# Patient Record
Sex: FEMALE | Race: WHITE | NOT HISPANIC OR LATINO | Employment: OTHER | URBAN - METROPOLITAN AREA
[De-identification: names, ages, dates, MRNs, and addresses within clinical notes are randomized per-mention and may not be internally consistent; named-entity substitution may affect disease eponyms.]

---

## 2018-05-27 ENCOUNTER — HOSPITAL ENCOUNTER (OUTPATIENT)
Facility: HOSPITAL | Age: 83
Setting detail: OBSERVATION
Discharge: HOME | End: 2018-05-30
Attending: FAMILY MEDICINE | Admitting: INTERNAL MEDICINE

## 2018-05-27 ENCOUNTER — APPOINTMENT (OUTPATIENT)
Dept: CT IMAGING | Facility: HOSPITAL | Age: 83
End: 2018-05-27

## 2018-05-27 DIAGNOSIS — K92.2 UPPER GI BLEED: Primary | ICD-10-CM

## 2018-05-27 DIAGNOSIS — K21.9 GASTROESOPHAGEAL REFLUX DISEASE, ESOPHAGITIS PRESENCE NOT SPECIFIED: ICD-10-CM

## 2018-05-27 DIAGNOSIS — D62 ANEMIA, POSTHEMORRHAGIC, ACUTE: ICD-10-CM

## 2018-05-27 PROBLEM — I10 HYPERTENSION: Status: ACTIVE | Noted: 2018-05-27

## 2018-05-27 PROBLEM — I51.89 DIASTOLIC DYSFUNCTION: Status: ACTIVE | Noted: 2018-05-27

## 2018-05-27 PROBLEM — G20 PARKINSONS (HCC): Status: ACTIVE | Noted: 2018-05-27

## 2018-05-27 PROBLEM — M06.9 RHEUMATOID ARTHRITIS (HCC): Status: ACTIVE | Noted: 2018-05-27

## 2018-05-27 LAB
ALBUMIN SERPL-MCNC: 4 G/DL (ref 3.5–5.2)
ALBUMIN/GLOB SERPL: 2.1 G/DL
ALP SERPL-CCNC: 48 U/L (ref 39–117)
ALT SERPL W P-5'-P-CCNC: 8 U/L (ref 1–33)
ANION GAP SERPL CALCULATED.3IONS-SCNC: 14 MMOL/L
AST SERPL-CCNC: 8 U/L (ref 1–32)
BACTERIA UR QL AUTO: ABNORMAL /HPF
BASOPHILS # BLD AUTO: 0.02 10*3/MM3 (ref 0–0.2)
BASOPHILS NFR BLD AUTO: 0.2 % (ref 0–1.5)
BILIRUB SERPL-MCNC: 0.7 MG/DL (ref 0.1–1.2)
BILIRUB UR QL STRIP: NEGATIVE
BUN BLD-MCNC: 36 MG/DL (ref 8–23)
BUN/CREAT SERPL: 44.4 (ref 7–25)
CALCIUM SPEC-SCNC: 9.2 MG/DL (ref 8.6–10.5)
CHLORIDE SERPL-SCNC: 98 MMOL/L (ref 98–107)
CLARITY UR: ABNORMAL
CO2 SERPL-SCNC: 33 MMOL/L (ref 22–29)
COLOR UR: YELLOW
CREAT BLD-MCNC: 0.81 MG/DL (ref 0.57–1)
DEPRECATED RDW RBC AUTO: 51.2 FL (ref 37–54)
EOSINOPHIL # BLD AUTO: 0.12 10*3/MM3 (ref 0–0.7)
EOSINOPHIL NFR BLD AUTO: 1.2 % (ref 0.3–6.2)
ERYTHROCYTE [DISTWIDTH] IN BLOOD BY AUTOMATED COUNT: 14.3 % (ref 11.7–13)
GFR SERPL CREATININE-BSD FRML MDRD: 68 ML/MIN/1.73
GLOBULIN UR ELPH-MCNC: 1.9 GM/DL
GLUCOSE BLD-MCNC: 98 MG/DL (ref 65–99)
GLUCOSE UR STRIP-MCNC: NEGATIVE MG/DL
HCT VFR BLD AUTO: 37.2 % (ref 35.6–45.5)
HCT VFR BLD AUTO: 37.9 % (ref 35.6–45.5)
HGB BLD-MCNC: 12.5 G/DL (ref 11.9–15.5)
HGB BLD-MCNC: 12.6 G/DL (ref 11.9–15.5)
HGB UR QL STRIP.AUTO: NEGATIVE
HYALINE CASTS UR QL AUTO: ABNORMAL /LPF
IMM GRANULOCYTES # BLD: 0.03 10*3/MM3 (ref 0–0.03)
IMM GRANULOCYTES NFR BLD: 0.3 % (ref 0–0.5)
KETONES UR QL STRIP: NEGATIVE
LEUKOCYTE ESTERASE UR QL STRIP.AUTO: ABNORMAL
LIPASE SERPL-CCNC: 18 U/L (ref 13–60)
LYMPHOCYTES # BLD AUTO: 1.51 10*3/MM3 (ref 0.9–4.8)
LYMPHOCYTES NFR BLD AUTO: 15.1 % (ref 19.6–45.3)
MCH RBC QN AUTO: 33 PG (ref 26.9–32)
MCHC RBC AUTO-ENTMCNC: 33.2 G/DL (ref 32.4–36.3)
MCV RBC AUTO: 99.2 FL (ref 80.5–98.2)
MONOCYTES # BLD AUTO: 0.77 10*3/MM3 (ref 0.2–1.2)
MONOCYTES NFR BLD AUTO: 7.7 % (ref 5–12)
NEUTROPHILS # BLD AUTO: 7.6 10*3/MM3 (ref 1.9–8.1)
NEUTROPHILS NFR BLD AUTO: 75.8 % (ref 42.7–76)
NITRITE UR QL STRIP: NEGATIVE
PH UR STRIP.AUTO: 7.5 [PH] (ref 5–8)
PLATELET # BLD AUTO: 185 10*3/MM3 (ref 140–500)
PMV BLD AUTO: 12.2 FL (ref 6–12)
POTASSIUM BLD-SCNC: 3.3 MMOL/L (ref 3.5–5.2)
PROT SERPL-MCNC: 5.9 G/DL (ref 6–8.5)
PROT UR QL STRIP: NEGATIVE
RBC # BLD AUTO: 3.82 10*6/MM3 (ref 3.9–5.2)
RBC # UR: ABNORMAL /HPF
REF LAB TEST METHOD: ABNORMAL
SODIUM BLD-SCNC: 145 MMOL/L (ref 136–145)
SP GR UR STRIP: >=1.03 (ref 1–1.03)
SQUAMOUS #/AREA URNS HPF: ABNORMAL /HPF
UROBILINOGEN UR QL STRIP: ABNORMAL
WBC NRBC COR # BLD: 10.02 10*3/MM3 (ref 4.5–10.7)
WBC UR QL AUTO: ABNORMAL /HPF

## 2018-05-27 PROCEDURE — 99284 EMERGENCY DEPT VISIT MOD MDM: CPT

## 2018-05-27 PROCEDURE — 96361 HYDRATE IV INFUSION ADD-ON: CPT

## 2018-05-27 PROCEDURE — 96365 THER/PROPH/DIAG IV INF INIT: CPT

## 2018-05-27 PROCEDURE — 81001 URINALYSIS AUTO W/SCOPE: CPT | Performed by: FAMILY MEDICINE

## 2018-05-27 PROCEDURE — 83690 ASSAY OF LIPASE: CPT | Performed by: FAMILY MEDICINE

## 2018-05-27 PROCEDURE — 85018 HEMOGLOBIN: CPT | Performed by: INTERNAL MEDICINE

## 2018-05-27 PROCEDURE — 85025 COMPLETE CBC W/AUTO DIFF WBC: CPT | Performed by: FAMILY MEDICINE

## 2018-05-27 PROCEDURE — G0378 HOSPITAL OBSERVATION PER HR: HCPCS

## 2018-05-27 PROCEDURE — 80053 COMPREHEN METABOLIC PANEL: CPT | Performed by: FAMILY MEDICINE

## 2018-05-27 PROCEDURE — 25010000002 IOPAMIDOL 61 % SOLUTION: Performed by: FAMILY MEDICINE

## 2018-05-27 PROCEDURE — 85014 HEMATOCRIT: CPT | Performed by: INTERNAL MEDICINE

## 2018-05-27 PROCEDURE — 25010000002 ONDANSETRON PER 1 MG: Performed by: FAMILY MEDICINE

## 2018-05-27 PROCEDURE — 96375 TX/PRO/DX INJ NEW DRUG ADDON: CPT

## 2018-05-27 PROCEDURE — 96376 TX/PRO/DX INJ SAME DRUG ADON: CPT

## 2018-05-27 PROCEDURE — 96366 THER/PROPH/DIAG IV INF ADDON: CPT

## 2018-05-27 PROCEDURE — 74177 CT ABD & PELVIS W/CONTRAST: CPT

## 2018-05-27 RX ORDER — FLUTICASONE PROPIONATE 50 MCG
2 SPRAY, SUSPENSION (ML) NASAL DAILY
COMMUNITY

## 2018-05-27 RX ORDER — SODIUM CHLORIDE 0.9 % (FLUSH) 0.9 %
1-10 SYRINGE (ML) INJECTION AS NEEDED
Status: DISCONTINUED | OUTPATIENT
Start: 2018-05-27 | End: 2018-05-30 | Stop reason: HOSPADM

## 2018-05-27 RX ORDER — ERGOCALCIFEROL 1.25 MG/1
50000 CAPSULE ORAL
COMMUNITY

## 2018-05-27 RX ORDER — ACETAMINOPHEN 325 MG/1
650 TABLET ORAL EVERY 6 HOURS PRN
Status: DISCONTINUED | OUTPATIENT
Start: 2018-05-27 | End: 2018-05-30 | Stop reason: HOSPADM

## 2018-05-27 RX ORDER — POTASSIUM CHLORIDE 750 MG/1
10 TABLET, FILM COATED, EXTENDED RELEASE ORAL DAILY
Status: ON HOLD | COMMUNITY
End: 2019-02-17 | Stop reason: SDUPTHER

## 2018-05-27 RX ORDER — FUROSEMIDE 40 MG/1
40 TABLET ORAL DAILY
COMMUNITY

## 2018-05-27 RX ORDER — CHLORDIAZEPOXIDE HYDROCHLORIDE 5 MG/1
5 CAPSULE, GELATIN COATED ORAL 3 TIMES DAILY PRN
Status: ON HOLD | COMMUNITY
End: 2019-02-17 | Stop reason: SDUPTHER

## 2018-05-27 RX ORDER — AMLODIPINE BESYLATE 10 MG/1
10 TABLET ORAL EVERY OTHER DAY
COMMUNITY
End: 2022-12-29 | Stop reason: HOSPADM

## 2018-05-27 RX ORDER — PAROXETINE HYDROCHLORIDE 20 MG/1
20 TABLET, FILM COATED ORAL EVERY MORNING
Status: DISCONTINUED | OUTPATIENT
Start: 2018-05-28 | End: 2018-05-30 | Stop reason: HOSPADM

## 2018-05-27 RX ORDER — CIPROFLOXACIN 250 MG/1
250 TABLET, FILM COATED ORAL 2 TIMES DAILY
COMMUNITY
End: 2018-05-30 | Stop reason: HOSPADM

## 2018-05-27 RX ORDER — HYDROCODONE BITARTRATE AND ACETAMINOPHEN 5; 325 MG/1; MG/1
1 TABLET ORAL EVERY 6 HOURS PRN
Status: DISCONTINUED | OUTPATIENT
Start: 2018-05-27 | End: 2018-05-30 | Stop reason: HOSPADM

## 2018-05-27 RX ORDER — POTASSIUM CHLORIDE 750 MG/1
20 CAPSULE, EXTENDED RELEASE ORAL DAILY
Status: DISCONTINUED | OUTPATIENT
Start: 2018-05-28 | End: 2018-05-30 | Stop reason: HOSPADM

## 2018-05-27 RX ORDER — SODIUM CHLORIDE 9 MG/ML
125 INJECTION, SOLUTION INTRAVENOUS CONTINUOUS
Status: DISCONTINUED | OUTPATIENT
Start: 2018-05-27 | End: 2018-05-28

## 2018-05-27 RX ORDER — MELATONIN
5000 DAILY
Status: DISCONTINUED | OUTPATIENT
Start: 2018-05-28 | End: 2018-05-30 | Stop reason: HOSPADM

## 2018-05-27 RX ORDER — HYDROCODONE BITARTRATE AND ACETAMINOPHEN 5; 325 MG/1; MG/1
1 TABLET ORAL EVERY 8 HOURS PRN
Status: ON HOLD | COMMUNITY
End: 2019-02-17 | Stop reason: SDUPTHER

## 2018-05-27 RX ORDER — MINERAL OIL AND PETROLATUM 150; 830 MG/G; MG/G
OINTMENT OPHTHALMIC
Status: DISCONTINUED | OUTPATIENT
Start: 2018-05-27 | End: 2018-05-30 | Stop reason: HOSPADM

## 2018-05-27 RX ORDER — AMLODIPINE BESYLATE 10 MG/1
10 TABLET ORAL DAILY
Status: DISCONTINUED | OUTPATIENT
Start: 2018-05-28 | End: 2018-05-30 | Stop reason: HOSPADM

## 2018-05-27 RX ORDER — POTASSIUM CHLORIDE 1.5 G/1.77G
20 POWDER, FOR SOLUTION ORAL DAILY
Status: DISCONTINUED | OUTPATIENT
Start: 2018-05-28 | End: 2018-05-27 | Stop reason: CLARIF

## 2018-05-27 RX ORDER — CHLORDIAZEPOXIDE HYDROCHLORIDE 5 MG/1
5 CAPSULE, GELATIN COATED ORAL 3 TIMES DAILY PRN
Status: DISCONTINUED | OUTPATIENT
Start: 2018-05-27 | End: 2018-05-30 | Stop reason: HOSPADM

## 2018-05-27 RX ORDER — BUDESONIDE AND FORMOTEROL FUMARATE DIHYDRATE 160; 4.5 UG/1; UG/1
2 AEROSOL RESPIRATORY (INHALATION)
Status: DISCONTINUED | OUTPATIENT
Start: 2018-05-27 | End: 2018-05-30 | Stop reason: HOSPADM

## 2018-05-27 RX ORDER — FUROSEMIDE 40 MG/1
40 TABLET ORAL DAILY
Status: DISCONTINUED | OUTPATIENT
Start: 2018-05-28 | End: 2018-05-30 | Stop reason: HOSPADM

## 2018-05-27 RX ORDER — FLUTICASONE PROPIONATE 50 MCG
2 SPRAY, SUSPENSION (ML) NASAL DAILY
Status: DISCONTINUED | OUTPATIENT
Start: 2018-05-28 | End: 2018-05-30 | Stop reason: HOSPADM

## 2018-05-27 RX ORDER — ONDANSETRON 2 MG/ML
4 INJECTION INTRAMUSCULAR; INTRAVENOUS ONCE
Status: COMPLETED | OUTPATIENT
Start: 2018-05-27 | End: 2018-05-27

## 2018-05-27 RX ORDER — ACETAMINOPHEN 325 MG/1
650 TABLET ORAL EVERY 6 HOURS PRN
Status: ON HOLD | COMMUNITY
End: 2019-02-12

## 2018-05-27 RX ORDER — LANSOPRAZOLE 30 MG/1
30 CAPSULE, DELAYED RELEASE ORAL DAILY
COMMUNITY
End: 2018-05-30 | Stop reason: HOSPADM

## 2018-05-27 RX ORDER — MELOXICAM 7.5 MG/1
7.5 TABLET ORAL 2 TIMES DAILY
COMMUNITY
End: 2018-05-30 | Stop reason: HOSPADM

## 2018-05-27 RX ORDER — PANTOPRAZOLE SODIUM 40 MG/10ML
80 INJECTION, POWDER, LYOPHILIZED, FOR SOLUTION INTRAVENOUS ONCE
Status: COMPLETED | OUTPATIENT
Start: 2018-05-27 | End: 2018-05-27

## 2018-05-27 RX ORDER — PAROXETINE HYDROCHLORIDE 20 MG/1
20 TABLET, FILM COATED ORAL EVERY MORNING
COMMUNITY

## 2018-05-27 RX ADMIN — PANTOPRAZOLE SODIUM 80 MG: 40 INJECTION, POWDER, FOR SOLUTION INTRAVENOUS at 15:45

## 2018-05-27 RX ADMIN — SODIUM CHLORIDE 8 MG/HR: 900 INJECTION INTRAVENOUS at 20:45

## 2018-05-27 RX ADMIN — SODIUM CHLORIDE 125 ML/HR: 9 INJECTION, SOLUTION INTRAVENOUS at 14:05

## 2018-05-27 RX ADMIN — ONDANSETRON 4 MG: 2 INJECTION INTRAMUSCULAR; INTRAVENOUS at 14:04

## 2018-05-27 RX ADMIN — SODIUM CHLORIDE 8 MG/HR: 900 INJECTION INTRAVENOUS at 15:45

## 2018-05-27 RX ADMIN — IOPAMIDOL 85 ML: 612 INJECTION, SOLUTION INTRAVENOUS at 14:31

## 2018-05-28 PROBLEM — D62 ANEMIA, POSTHEMORRHAGIC, ACUTE: Status: ACTIVE | Noted: 2018-05-28

## 2018-05-28 LAB
ALBUMIN SERPL-MCNC: 3.4 G/DL (ref 3.5–5.2)
ALBUMIN/GLOB SERPL: 2 G/DL
ALP SERPL-CCNC: 38 U/L (ref 39–117)
ALT SERPL W P-5'-P-CCNC: 7 U/L (ref 1–33)
ANION GAP SERPL CALCULATED.3IONS-SCNC: 16 MMOL/L
AST SERPL-CCNC: 13 U/L (ref 1–32)
BASOPHILS # BLD AUTO: 0.02 10*3/MM3 (ref 0–0.2)
BASOPHILS NFR BLD AUTO: 0.3 % (ref 0–1.5)
BILIRUB SERPL-MCNC: 0.5 MG/DL (ref 0.1–1.2)
BUN BLD-MCNC: 25 MG/DL (ref 8–23)
BUN/CREAT SERPL: 43.1 (ref 7–25)
CALCIUM SPEC-SCNC: 8.3 MG/DL (ref 8.6–10.5)
CHLORIDE SERPL-SCNC: 103 MMOL/L (ref 98–107)
CO2 SERPL-SCNC: 25 MMOL/L (ref 22–29)
CREAT BLD-MCNC: 0.58 MG/DL (ref 0.57–1)
DEPRECATED RDW RBC AUTO: 54.3 FL (ref 37–54)
EOSINOPHIL # BLD AUTO: 0.12 10*3/MM3 (ref 0–0.7)
EOSINOPHIL NFR BLD AUTO: 1.8 % (ref 0.3–6.2)
ERYTHROCYTE [DISTWIDTH] IN BLOOD BY AUTOMATED COUNT: 14.7 % (ref 11.7–13)
GFR SERPL CREATININE-BSD FRML MDRD: 100 ML/MIN/1.73
GLOBULIN UR ELPH-MCNC: 1.7 GM/DL
GLUCOSE BLD-MCNC: 79 MG/DL (ref 65–99)
HCT VFR BLD AUTO: 32.2 % (ref 35.6–45.5)
HCT VFR BLD AUTO: 32.8 % (ref 35.6–45.5)
HCT VFR BLD AUTO: 33.1 % (ref 35.6–45.5)
HCT VFR BLD AUTO: 37.1 % (ref 35.6–45.5)
HGB BLD-MCNC: 10.6 G/DL (ref 11.9–15.5)
HGB BLD-MCNC: 10.7 G/DL (ref 11.9–15.5)
HGB BLD-MCNC: 10.8 G/DL (ref 11.9–15.5)
HGB BLD-MCNC: 12.1 G/DL (ref 11.9–15.5)
IMM GRANULOCYTES # BLD: 0.01 10*3/MM3 (ref 0–0.03)
IMM GRANULOCYTES NFR BLD: 0.1 % (ref 0–0.5)
LYMPHOCYTES # BLD AUTO: 2.49 10*3/MM3 (ref 0.9–4.8)
LYMPHOCYTES NFR BLD AUTO: 36.9 % (ref 19.6–45.3)
MCH RBC QN AUTO: 33.4 PG (ref 26.9–32)
MCHC RBC AUTO-ENTMCNC: 32.9 G/DL (ref 32.4–36.3)
MCV RBC AUTO: 101.6 FL (ref 80.5–98.2)
MONOCYTES # BLD AUTO: 0.78 10*3/MM3 (ref 0.2–1.2)
MONOCYTES NFR BLD AUTO: 11.6 % (ref 5–12)
NEUTROPHILS # BLD AUTO: 3.34 10*3/MM3 (ref 1.9–8.1)
NEUTROPHILS NFR BLD AUTO: 49.4 % (ref 42.7–76)
PLATELET # BLD AUTO: 74 10*3/MM3 (ref 140–500)
PMV BLD AUTO: 13.1 FL (ref 6–12)
POTASSIUM BLD-SCNC: 3 MMOL/L (ref 3.5–5.2)
PROT SERPL-MCNC: 5.1 G/DL (ref 6–8.5)
RBC # BLD AUTO: 3.17 10*6/MM3 (ref 3.9–5.2)
RETICS/RBC NFR AUTO: 1.28 % (ref 0.5–1.5)
SODIUM BLD-SCNC: 144 MMOL/L (ref 136–145)
WBC NRBC COR # BLD: 6.75 10*3/MM3 (ref 4.5–10.7)

## 2018-05-28 PROCEDURE — 85014 HEMATOCRIT: CPT | Performed by: INTERNAL MEDICINE

## 2018-05-28 PROCEDURE — 85025 COMPLETE CBC W/AUTO DIFF WBC: CPT | Performed by: INTERNAL MEDICINE

## 2018-05-28 PROCEDURE — 85018 HEMOGLOBIN: CPT | Performed by: INTERNAL MEDICINE

## 2018-05-28 PROCEDURE — 96366 THER/PROPH/DIAG IV INF ADDON: CPT

## 2018-05-28 PROCEDURE — 94640 AIRWAY INHALATION TREATMENT: CPT

## 2018-05-28 PROCEDURE — 80053 COMPREHEN METABOLIC PANEL: CPT | Performed by: INTERNAL MEDICINE

## 2018-05-28 PROCEDURE — 85045 AUTOMATED RETICULOCYTE COUNT: CPT | Performed by: INTERNAL MEDICINE

## 2018-05-28 PROCEDURE — 99203 OFFICE O/P NEW LOW 30 MIN: CPT | Performed by: INTERNAL MEDICINE

## 2018-05-28 PROCEDURE — G0378 HOSPITAL OBSERVATION PER HR: HCPCS

## 2018-05-28 RX ORDER — POTASSIUM CHLORIDE 750 MG/1
40 CAPSULE, EXTENDED RELEASE ORAL EVERY 4 HOURS
Status: DISPENSED | OUTPATIENT
Start: 2018-05-28 | End: 2018-05-28

## 2018-05-28 RX ORDER — POTASSIUM CHLORIDE 750 MG/1
40 CAPSULE, EXTENDED RELEASE ORAL EVERY 4 HOURS
Status: CANCELLED | OUTPATIENT
Start: 2018-05-28 | End: 2018-05-28

## 2018-05-28 RX ORDER — ONDANSETRON 4 MG/1
4 TABLET, FILM COATED ORAL EVERY 6 HOURS PRN
Status: DISCONTINUED | OUTPATIENT
Start: 2018-05-28 | End: 2018-05-30 | Stop reason: HOSPADM

## 2018-05-28 RX ORDER — ONDANSETRON 4 MG/1
4 TABLET, ORALLY DISINTEGRATING ORAL EVERY 6 HOURS PRN
Status: DISCONTINUED | OUTPATIENT
Start: 2018-05-28 | End: 2018-05-30 | Stop reason: HOSPADM

## 2018-05-28 RX ORDER — ONDANSETRON 2 MG/ML
4 INJECTION INTRAMUSCULAR; INTRAVENOUS EVERY 6 HOURS PRN
Status: DISCONTINUED | OUTPATIENT
Start: 2018-05-28 | End: 2018-05-30 | Stop reason: HOSPADM

## 2018-05-28 RX ADMIN — VITAMIN D, TAB 1000IU (100/BT) 5000 UNITS: 25 TAB at 11:45

## 2018-05-28 RX ADMIN — HYDROCODONE BITARTRATE AND ACETAMINOPHEN 1 TABLET: 5; 325 TABLET ORAL at 20:53

## 2018-05-28 RX ADMIN — CARBIDOPA AND LEVODOPA 2 TABLET: 25; 100 TABLET ORAL at 11:44

## 2018-05-28 RX ADMIN — POTASSIUM CHLORIDE 20 MEQ: 750 CAPSULE, EXTENDED RELEASE ORAL at 11:44

## 2018-05-28 RX ADMIN — SODIUM CHLORIDE 8 MG/HR: 900 INJECTION INTRAVENOUS at 22:10

## 2018-05-28 RX ADMIN — SODIUM CHLORIDE 8 MG/HR: 900 INJECTION INTRAVENOUS at 11:43

## 2018-05-28 RX ADMIN — AMLODIPINE BESYLATE 10 MG: 10 TABLET ORAL at 11:44

## 2018-05-28 RX ADMIN — SODIUM CHLORIDE 125 ML/HR: 9 INJECTION, SOLUTION INTRAVENOUS at 05:07

## 2018-05-28 RX ADMIN — CARBIDOPA AND LEVODOPA 2 TABLET: 25; 100 TABLET ORAL at 17:02

## 2018-05-28 RX ADMIN — POTASSIUM CHLORIDE 40 MEQ: 750 CAPSULE, EXTENDED RELEASE ORAL at 17:03

## 2018-05-28 RX ADMIN — BUDESONIDE AND FORMOTEROL FUMARATE DIHYDRATE 2 PUFF: 160; 4.5 AEROSOL RESPIRATORY (INHALATION) at 21:13

## 2018-05-28 RX ADMIN — SODIUM CHLORIDE 8 MG/HR: 900 INJECTION INTRAVENOUS at 05:07

## 2018-05-28 RX ADMIN — SODIUM CHLORIDE 8 MG/HR: 900 INJECTION INTRAVENOUS at 00:48

## 2018-05-28 RX ADMIN — PAROXETINE HYDROCHLORIDE 20 MG: 20 TABLET, FILM COATED ORAL at 11:50

## 2018-05-28 RX ADMIN — SODIUM CHLORIDE 125 ML/HR: 9 INJECTION, SOLUTION INTRAVENOUS at 11:43

## 2018-05-28 RX ADMIN — FUROSEMIDE 40 MG: 40 TABLET ORAL at 11:44

## 2018-05-28 RX ADMIN — CARBIDOPA AND LEVODOPA 2 TABLET: 25; 100 TABLET ORAL at 20:53

## 2018-05-29 ENCOUNTER — APPOINTMENT (OUTPATIENT)
Dept: GENERAL RADIOLOGY | Facility: HOSPITAL | Age: 83
End: 2018-05-29
Attending: INTERNAL MEDICINE

## 2018-05-29 PROBLEM — K21.9 GASTROESOPHAGEAL REFLUX DISEASE: Status: ACTIVE | Noted: 2018-05-29

## 2018-05-29 LAB
FERRITIN SERPL-MCNC: 95.92 NG/ML (ref 13–150)
HCT VFR BLD AUTO: 31.3 % (ref 35.6–45.5)
HCT VFR BLD AUTO: 32.5 % (ref 35.6–45.5)
HGB BLD-MCNC: 10.2 G/DL (ref 11.9–15.5)
HGB BLD-MCNC: 10.8 G/DL (ref 11.9–15.5)
INR PPP: 1.05 (ref 0.9–1.1)
IRON 24H UR-MRATE: 92 MCG/DL (ref 37–145)
IRON SATN MFR SERPL: 27 % (ref 20–50)
MAGNESIUM SERPL-MCNC: 1.6 MG/DL (ref 1.6–2.4)
PROTHROMBIN TIME: 13.5 SECONDS (ref 11.7–14.2)
TIBC SERPL-MCNC: 338 MCG/DL
TRANSFERRIN SERPL-MCNC: 227 MG/DL (ref 200–360)
VIT B12 BLD-MCNC: 192 PG/ML (ref 211–946)

## 2018-05-29 PROCEDURE — 83540 ASSAY OF IRON: CPT | Performed by: INTERNAL MEDICINE

## 2018-05-29 PROCEDURE — 83735 ASSAY OF MAGNESIUM: CPT | Performed by: HOSPITALIST

## 2018-05-29 PROCEDURE — 71046 X-RAY EXAM CHEST 2 VIEWS: CPT

## 2018-05-29 PROCEDURE — 82728 ASSAY OF FERRITIN: CPT | Performed by: INTERNAL MEDICINE

## 2018-05-29 PROCEDURE — 82747 ASSAY OF FOLIC ACID RBC: CPT | Performed by: INTERNAL MEDICINE

## 2018-05-29 PROCEDURE — 85018 HEMOGLOBIN: CPT | Performed by: INTERNAL MEDICINE

## 2018-05-29 PROCEDURE — 85610 PROTHROMBIN TIME: CPT | Performed by: INTERNAL MEDICINE

## 2018-05-29 PROCEDURE — 96372 THER/PROPH/DIAG INJ SC/IM: CPT

## 2018-05-29 PROCEDURE — 96366 THER/PROPH/DIAG IV INF ADDON: CPT

## 2018-05-29 PROCEDURE — G0378 HOSPITAL OBSERVATION PER HR: HCPCS

## 2018-05-29 PROCEDURE — 82607 VITAMIN B-12: CPT | Performed by: INTERNAL MEDICINE

## 2018-05-29 PROCEDURE — 85014 HEMATOCRIT: CPT | Performed by: INTERNAL MEDICINE

## 2018-05-29 PROCEDURE — 25010000002 CYANOCOBALAMIN PER 1000 MCG: Performed by: HOSPITALIST

## 2018-05-29 PROCEDURE — 99225 PR SBSQ OBSERVATION CARE/DAY 25 MINUTES: CPT | Performed by: INTERNAL MEDICINE

## 2018-05-29 PROCEDURE — 94799 UNLISTED PULMONARY SVC/PX: CPT

## 2018-05-29 PROCEDURE — 84466 ASSAY OF TRANSFERRIN: CPT | Performed by: INTERNAL MEDICINE

## 2018-05-29 RX ORDER — OXYMETAZOLINE HYDROCHLORIDE 0.05 G/100ML
2 SPRAY NASAL 2 TIMES DAILY
Status: DISCONTINUED | OUTPATIENT
Start: 2018-05-29 | End: 2018-05-30 | Stop reason: HOSPADM

## 2018-05-29 RX ORDER — ECHINACEA PURPUREA EXTRACT 125 MG
1 TABLET ORAL AS NEEDED
Status: DISCONTINUED | OUTPATIENT
Start: 2018-05-29 | End: 2018-05-30 | Stop reason: HOSPADM

## 2018-05-29 RX ORDER — CYANOCOBALAMIN 1000 UG/ML
1000 INJECTION, SOLUTION INTRAMUSCULAR; SUBCUTANEOUS
Status: DISCONTINUED | OUTPATIENT
Start: 2018-05-29 | End: 2018-05-30 | Stop reason: HOSPADM

## 2018-05-29 RX ORDER — FLUTICASONE PROPIONATE 50 MCG
2 SPRAY, SUSPENSION (ML) NASAL NIGHTLY
Status: DISCONTINUED | OUTPATIENT
Start: 2018-05-29 | End: 2018-05-29

## 2018-05-29 RX ADMIN — SODIUM CHLORIDE 8 MG/HR: 900 INJECTION INTRAVENOUS at 13:00

## 2018-05-29 RX ADMIN — BUDESONIDE AND FORMOTEROL FUMARATE DIHYDRATE 2 PUFF: 160; 4.5 AEROSOL RESPIRATORY (INHALATION) at 19:27

## 2018-05-29 RX ADMIN — CARBIDOPA AND LEVODOPA 2 TABLET: 25; 100 TABLET ORAL at 08:02

## 2018-05-29 RX ADMIN — FUROSEMIDE 40 MG: 40 TABLET ORAL at 08:02

## 2018-05-29 RX ADMIN — SODIUM CHLORIDE 8 MG/HR: 900 INJECTION INTRAVENOUS at 07:50

## 2018-05-29 RX ADMIN — SODIUM CHLORIDE 8 MG/HR: 900 INJECTION INTRAVENOUS at 02:43

## 2018-05-29 RX ADMIN — PAROXETINE HYDROCHLORIDE 20 MG: 20 TABLET, FILM COATED ORAL at 08:02

## 2018-05-29 RX ADMIN — FLUTICASONE PROPIONATE 2 SPRAY: 50 SPRAY, METERED NASAL at 08:03

## 2018-05-29 RX ADMIN — OXYMETAZOLINE HYDROCHLORIDE 2 SPRAY: 5 SPRAY NASAL at 20:45

## 2018-05-29 RX ADMIN — AMLODIPINE BESYLATE 10 MG: 10 TABLET ORAL at 08:02

## 2018-05-29 RX ADMIN — CYANOCOBALAMIN 1000 MCG: 1000 INJECTION, SOLUTION INTRAMUSCULAR at 16:15

## 2018-05-29 RX ADMIN — CARBIDOPA AND LEVODOPA 2 TABLET: 25; 100 TABLET ORAL at 20:45

## 2018-05-29 RX ADMIN — BUDESONIDE AND FORMOTEROL FUMARATE DIHYDRATE 2 PUFF: 160; 4.5 AEROSOL RESPIRATORY (INHALATION) at 09:01

## 2018-05-29 RX ADMIN — SODIUM CHLORIDE 8 MG/HR: 900 INJECTION INTRAVENOUS at 18:38

## 2018-05-29 RX ADMIN — VITAMIN D, TAB 1000IU (100/BT) 5000 UNITS: 25 TAB at 08:02

## 2018-05-29 RX ADMIN — CARBIDOPA AND LEVODOPA 2 TABLET: 25; 100 TABLET ORAL at 11:12

## 2018-05-29 RX ADMIN — CARBIDOPA AND LEVODOPA 2 TABLET: 25; 100 TABLET ORAL at 17:06

## 2018-05-29 RX ADMIN — HYDROCODONE BITARTRATE AND ACETAMINOPHEN 1 TABLET: 5; 325 TABLET ORAL at 22:55

## 2018-05-29 RX ADMIN — POTASSIUM CHLORIDE 20 MEQ: 750 CAPSULE, EXTENDED RELEASE ORAL at 08:02

## 2018-05-29 RX ADMIN — SODIUM CHLORIDE 8 MG/HR: 900 INJECTION INTRAVENOUS at 23:26

## 2018-05-30 ENCOUNTER — ANESTHESIA EVENT (OUTPATIENT)
Dept: GASTROENTEROLOGY | Facility: HOSPITAL | Age: 83
End: 2018-05-30

## 2018-05-30 ENCOUNTER — ANESTHESIA (OUTPATIENT)
Dept: GASTROENTEROLOGY | Facility: HOSPITAL | Age: 83
End: 2018-05-30

## 2018-05-30 VITALS
BODY MASS INDEX: 26.08 KG/M2 | HEART RATE: 74 BPM | DIASTOLIC BLOOD PRESSURE: 77 MMHG | SYSTOLIC BLOOD PRESSURE: 123 MMHG | TEMPERATURE: 97.9 F | OXYGEN SATURATION: 94 % | WEIGHT: 162.3 LBS | RESPIRATION RATE: 16 BRPM | HEIGHT: 66 IN

## 2018-05-30 LAB
DEPRECATED RDW RBC AUTO: 52.7 FL (ref 37–54)
ERYTHROCYTE [DISTWIDTH] IN BLOOD BY AUTOMATED COUNT: 14 % (ref 11.7–13)
FOLATE BLD-MCNC: 205.4 NG/ML
FOLATE RBC-MCNC: 667 NG/ML
HCT VFR BLD AUTO: 30.8 % (ref 34–46.6)
HCT VFR BLD AUTO: 32.6 % (ref 35.6–45.5)
HGB BLD-MCNC: 10.7 G/DL (ref 11.9–15.5)
MCH RBC QN AUTO: 33.3 PG (ref 26.9–32)
MCHC RBC AUTO-ENTMCNC: 32.8 G/DL (ref 32.4–36.3)
MCV RBC AUTO: 101.6 FL (ref 80.5–98.2)
PLATELET # BLD AUTO: 157 10*3/MM3 (ref 140–500)
PMV BLD AUTO: 12.3 FL (ref 6–12)
RBC # BLD AUTO: 3.21 10*6/MM3 (ref 3.9–5.2)
WBC NRBC COR # BLD: 8.1 10*3/MM3 (ref 4.5–10.7)

## 2018-05-30 PROCEDURE — 96366 THER/PROPH/DIAG IV INF ADDON: CPT

## 2018-05-30 PROCEDURE — 87081 CULTURE SCREEN ONLY: CPT | Performed by: INTERNAL MEDICINE

## 2018-05-30 PROCEDURE — G0378 HOSPITAL OBSERVATION PER HR: HCPCS

## 2018-05-30 PROCEDURE — 25010000002 PROPOFOL 10 MG/ML EMULSION: Performed by: ANESTHESIOLOGY

## 2018-05-30 PROCEDURE — 94799 UNLISTED PULMONARY SVC/PX: CPT

## 2018-05-30 PROCEDURE — 88305 TISSUE EXAM BY PATHOLOGIST: CPT | Performed by: INTERNAL MEDICINE

## 2018-05-30 PROCEDURE — 85027 COMPLETE CBC AUTOMATED: CPT | Performed by: HOSPITALIST

## 2018-05-30 PROCEDURE — 43239 EGD BIOPSY SINGLE/MULTIPLE: CPT | Performed by: INTERNAL MEDICINE

## 2018-05-30 PROCEDURE — 88312 SPECIAL STAINS GROUP 1: CPT | Performed by: INTERNAL MEDICINE

## 2018-05-30 RX ORDER — LANOLIN ALCOHOL/MO/W.PET/CERES
1000 CREAM (GRAM) TOPICAL DAILY
Qty: 30 TABLET | Refills: 0 | Status: SHIPPED | OUTPATIENT
Start: 2018-05-30

## 2018-05-30 RX ORDER — PANTOPRAZOLE SODIUM 40 MG/1
40 TABLET, DELAYED RELEASE ORAL
Status: DISCONTINUED | OUTPATIENT
Start: 2018-05-30 | End: 2018-05-30 | Stop reason: HOSPADM

## 2018-05-30 RX ORDER — SUCRALFATE ORAL 1 G/10ML
1 SUSPENSION ORAL 4 TIMES DAILY
Qty: 420 ML | Refills: 0 | Status: SHIPPED | OUTPATIENT
Start: 2018-05-30 | End: 2020-03-11

## 2018-05-30 RX ORDER — ECHINACEA PURPUREA EXTRACT 125 MG
1 TABLET ORAL AS NEEDED
Refills: 12
Start: 2018-05-30 | End: 2019-01-02

## 2018-05-30 RX ORDER — PANTOPRAZOLE SODIUM 40 MG/1
40 TABLET, DELAYED RELEASE ORAL 2 TIMES DAILY
Qty: 60 TABLET | Refills: 0 | Status: SHIPPED | OUTPATIENT
Start: 2018-05-30 | End: 2018-05-30

## 2018-05-30 RX ORDER — PROPOFOL 10 MG/ML
VIAL (ML) INTRAVENOUS AS NEEDED
Status: DISCONTINUED | OUTPATIENT
Start: 2018-05-30 | End: 2018-05-30 | Stop reason: SURG

## 2018-05-30 RX ORDER — PANTOPRAZOLE SODIUM 40 MG/1
40 TABLET, DELAYED RELEASE ORAL DAILY
Qty: 30 TABLET | Refills: 0 | Status: SHIPPED | OUTPATIENT
Start: 2018-05-30

## 2018-05-30 RX ORDER — LIDOCAINE HYDROCHLORIDE 20 MG/ML
INJECTION, SOLUTION INFILTRATION; PERINEURAL AS NEEDED
Status: DISCONTINUED | OUTPATIENT
Start: 2018-05-30 | End: 2018-05-30 | Stop reason: SURG

## 2018-05-30 RX ORDER — SODIUM CHLORIDE 0.9 % (FLUSH) 0.9 %
3 SYRINGE (ML) INJECTION AS NEEDED
Status: DISCONTINUED | OUTPATIENT
Start: 2018-05-30 | End: 2018-05-30 | Stop reason: HOSPADM

## 2018-05-30 RX ORDER — SUCRALFATE ORAL 1 G/10ML
1 SUSPENSION ORAL
Status: DISCONTINUED | OUTPATIENT
Start: 2018-05-30 | End: 2018-05-30 | Stop reason: HOSPADM

## 2018-05-30 RX ORDER — SODIUM CHLORIDE, SODIUM LACTATE, POTASSIUM CHLORIDE, CALCIUM CHLORIDE 600; 310; 30; 20 MG/100ML; MG/100ML; MG/100ML; MG/100ML
1000 INJECTION, SOLUTION INTRAVENOUS CONTINUOUS
Status: DISCONTINUED | OUTPATIENT
Start: 2018-05-30 | End: 2018-05-30 | Stop reason: HOSPADM

## 2018-05-30 RX ORDER — OXYMETAZOLINE HYDROCHLORIDE 0.05 G/100ML
2 SPRAY NASAL 2 TIMES DAILY
Qty: 1 ML | Refills: 0
Start: 2018-05-30 | End: 2018-06-01

## 2018-05-30 RX ADMIN — AMLODIPINE BESYLATE 10 MG: 10 TABLET ORAL at 09:56

## 2018-05-30 RX ADMIN — OXYMETAZOLINE HYDROCHLORIDE 2 SPRAY: 5 SPRAY NASAL at 09:56

## 2018-05-30 RX ADMIN — VITAMIN D, TAB 1000IU (100/BT) 5000 UNITS: 25 TAB at 09:55

## 2018-05-30 RX ADMIN — PROPOFOL 200 MG: 10 INJECTION, EMULSION INTRAVENOUS at 08:35

## 2018-05-30 RX ADMIN — BUDESONIDE AND FORMOTEROL FUMARATE DIHYDRATE 2 PUFF: 160; 4.5 AEROSOL RESPIRATORY (INHALATION) at 10:55

## 2018-05-30 RX ADMIN — SODIUM CHLORIDE, POTASSIUM CHLORIDE, SODIUM LACTATE AND CALCIUM CHLORIDE 1000 ML: 600; 310; 30; 20 INJECTION, SOLUTION INTRAVENOUS at 07:58

## 2018-05-30 RX ADMIN — FUROSEMIDE 40 MG: 40 TABLET ORAL at 09:56

## 2018-05-30 RX ADMIN — PAROXETINE HYDROCHLORIDE 20 MG: 20 TABLET, FILM COATED ORAL at 07:06

## 2018-05-30 RX ADMIN — LIDOCAINE HYDROCHLORIDE 50 MG: 20 INJECTION, SOLUTION INFILTRATION; PERINEURAL at 08:35

## 2018-05-30 RX ADMIN — SODIUM CHLORIDE, POTASSIUM CHLORIDE, SODIUM LACTATE AND CALCIUM CHLORIDE: 600; 310; 30; 20 INJECTION, SOLUTION INTRAVENOUS at 08:35

## 2018-05-30 RX ADMIN — FLUTICASONE PROPIONATE 2 SPRAY: 50 SPRAY, METERED NASAL at 09:56

## 2018-05-30 RX ADMIN — CARBIDOPA AND LEVODOPA 2 TABLET: 25; 100 TABLET ORAL at 07:06

## 2018-05-30 RX ADMIN — SODIUM CHLORIDE 8 MG/HR: 900 INJECTION INTRAVENOUS at 03:50

## 2018-05-30 RX ADMIN — POTASSIUM CHLORIDE 20 MEQ: 750 CAPSULE, EXTENDED RELEASE ORAL at 09:56

## 2018-05-30 NOTE — ANESTHESIA POSTPROCEDURE EVALUATION
"Patient: Trang Liu    Procedure Summary     Date:  05/30/18 Room / Location:   [facility]    Anesthesia Start:  0837 Anesthesia Stop:  0858    Procedure:  ESOPHAGOGASTRODUODENOSCOPY WITH COLD BIOPSIES (N/A Esophagus) Diagnosis:       Anemia, posthemorrhagic, acute      Upper GI bleed      Gastroesophageal reflux disease, esophagitis presence not specified      (Anemia, posthemorrhagic, acute [D62])      (Upper GI bleed [K92.2])      (Gastroesophageal reflux disease, esophagitis presence not specified [K21.9])    Surgeon:  Jae Baez MD Provider:  Enzo Caballero MD    Anesthesia Type:  MAC ASA Status:  3          Anesthesia Type: MAC  Last vitals  BP   161/94 (05/30/18 0856)   Temp   37 °C (98.6 °F) (05/30/18 0754)   Pulse   91 (05/30/18 0856)   Resp   16 (05/30/18 0856)     SpO2   93 % (05/30/18 0856)     Post Anesthesia Care and Evaluation    Patient location during evaluation: bedside  Patient participation: complete - patient participated  Level of consciousness: awake and alert  Pain management: adequate  Airway patency: patent  Anesthetic complications: No anesthetic complications    Cardiovascular status: acceptable  Respiratory status: acceptable  Hydration status: acceptable    Comments: /94 (BP Location: Left arm, Patient Position: Lying)   Pulse 91   Temp 37 °C (98.6 °F) (Oral)   Resp 16   Ht 167.6 cm (66\")   Wt 73.6 kg (162 lb 4.8 oz)   SpO2 93%   BMI 26.20 kg/m²       "

## 2018-05-30 NOTE — ANESTHESIA PREPROCEDURE EVALUATION
Anesthesia Evaluation     Patient summary reviewed and Nursing notes reviewed   NPO Solid Status: > 8 hours  NPO Liquid Status: > 8 hours           Airway   Mallampati: II  TM distance: >3 FB  Neck ROM: full  no difficulty expected  Dental - normal exam     Pulmonary - normal exam   Cardiovascular - normal exam    (+) hypertension, valvular problems/murmurs MR,       Neuro/Psych  (+) tremors, psychiatric history Anxiety,     GI/Hepatic/Renal/Endo    (+)  GERD, GI bleeding,     Musculoskeletal     Abdominal  - normal exam   Substance History      OB/GYN          Other   (+) arthritis                     Anesthesia Plan    ASA 3     MAC     Anesthetic plan and risks discussed with patient.

## 2018-05-31 LAB
CYTO UR: NORMAL
LAB AP CASE REPORT: NORMAL
Lab: NORMAL
Lab: NORMAL
PATH REPORT.FINAL DX SPEC: NORMAL
UREASE TISS QL: NEGATIVE

## 2018-06-09 NOTE — PROGRESS NOTES
Tell her that pathology from the EGD that was done last month showed an erosion and minimal inflammation in the stomach but no evidence of Helicobacter pylori.  We can discuss in more detail if she wants to follow up in the office

## 2018-06-11 ENCOUNTER — TELEPHONE (OUTPATIENT)
Dept: GASTROENTEROLOGY | Facility: CLINIC | Age: 83
End: 2018-06-11

## 2018-06-11 NOTE — TELEPHONE ENCOUNTER
----- Message from Jae Baez MD sent at 6/9/2018  1:30 PM EDT -----  Tell her that pathology from the EGD that was done last month showed an erosion and minimal inflammation in the stomach but no evidence of Helicobacter pylori.  We can discuss in more detail if she wants to follow up in the office

## 2018-12-17 ENCOUNTER — TRANSCRIBE ORDERS (OUTPATIENT)
Dept: ADMINISTRATIVE | Facility: HOSPITAL | Age: 83
End: 2018-12-17

## 2018-12-17 DIAGNOSIS — N39.0 URINARY TRACT INFECTION WITHOUT HEMATURIA, SITE UNSPECIFIED: Primary | ICD-10-CM

## 2018-12-18 PROBLEM — N39.0 URINARY TRACT INFECTION, SITE NOT SPECIFIED: Status: ACTIVE | Noted: 2018-12-18

## 2018-12-19 ENCOUNTER — HOSPITAL ENCOUNTER (OUTPATIENT)
Dept: GENERAL RADIOLOGY | Facility: HOSPITAL | Age: 83
Discharge: HOME | End: 2018-12-19
Attending: UROLOGY

## 2018-12-20 ENCOUNTER — HOSPITAL ENCOUNTER (OUTPATIENT)
Dept: INFUSION THERAPY | Facility: HOSPITAL | Age: 83
Discharge: HOME | End: 2018-12-20

## 2018-12-21 ENCOUNTER — APPOINTMENT (OUTPATIENT)
Dept: INFUSION THERAPY | Facility: HOSPITAL | Age: 83
End: 2018-12-21

## 2018-12-22 ENCOUNTER — APPOINTMENT (OUTPATIENT)
Dept: INFUSION THERAPY | Facility: HOSPITAL | Age: 83
End: 2018-12-22

## 2018-12-23 ENCOUNTER — APPOINTMENT (OUTPATIENT)
Dept: INFUSION THERAPY | Facility: HOSPITAL | Age: 83
End: 2018-12-23

## 2018-12-24 ENCOUNTER — APPOINTMENT (OUTPATIENT)
Dept: INFUSION THERAPY | Facility: HOSPITAL | Age: 83
End: 2018-12-24

## 2018-12-25 ENCOUNTER — APPOINTMENT (OUTPATIENT)
Dept: INFUSION THERAPY | Facility: HOSPITAL | Age: 83
End: 2018-12-25

## 2019-01-02 ENCOUNTER — HOSPITAL ENCOUNTER (OUTPATIENT)
Dept: GENERAL RADIOLOGY | Facility: HOSPITAL | Age: 84
Discharge: HOME | End: 2019-01-02
Attending: UROLOGY | Admitting: RADIOLOGY

## 2019-01-02 VITALS
SYSTOLIC BLOOD PRESSURE: 126 MMHG | HEIGHT: 66 IN | OXYGEN SATURATION: 96 % | WEIGHT: 149 LBS | HEART RATE: 72 BPM | DIASTOLIC BLOOD PRESSURE: 78 MMHG | TEMPERATURE: 97.1 F | RESPIRATION RATE: 18 BRPM | BODY MASS INDEX: 23.95 KG/M2

## 2019-01-02 DIAGNOSIS — N39.0 URINARY TRACT INFECTION WITHOUT HEMATURIA, SITE UNSPECIFIED: ICD-10-CM

## 2019-01-02 PROBLEM — M06.9 RHEUMATOID ARTHRITIS: Status: ACTIVE | Noted: 2019-01-02

## 2019-01-02 LAB — CREAT BLDA-MCNC: 0.8 MG/DL (ref 0.6–1.3)

## 2019-01-02 PROCEDURE — 96365 THER/PROPH/DIAG IV INF INIT: CPT

## 2019-01-02 PROCEDURE — 82565 ASSAY OF CREATININE: CPT

## 2019-01-02 PROCEDURE — C1751 CATH, INF, PER/CENT/MIDLINE: HCPCS

## 2019-01-02 PROCEDURE — 25010000002 GENTAMICIN PER 80 MG: Performed by: UROLOGY

## 2019-01-02 PROCEDURE — 76937 US GUIDE VASCULAR ACCESS: CPT

## 2019-01-02 RX ORDER — ECHINACEA PURPUREA EXTRACT 125 MG
TABLET ORAL
COMMUNITY
Start: 2018-05-30 | End: 2019-01-02

## 2019-01-02 RX ORDER — LIDOCAINE HYDROCHLORIDE 10 MG/ML
10 INJECTION, SOLUTION INFILTRATION; PERINEURAL ONCE
Status: COMPLETED | OUTPATIENT
Start: 2019-01-02 | End: 2019-01-02

## 2019-01-02 RX ADMIN — GENTAMICIN SULFATE 180 MG: 40 INJECTION, SOLUTION INTRAMUSCULAR; INTRAVENOUS at 11:40

## 2019-01-02 RX ADMIN — LIDOCAINE HYDROCHLORIDE 7 ML: 10 INJECTION, SOLUTION INFILTRATION; PERINEURAL at 11:09

## 2019-01-02 NOTE — DISCHARGE INSTRUCTIONS
"PICC Home Guide  A peripherally inserted central catheter (PICC) is a long, thin, flexible tube that is inserted into a vein in the upper arm. It is a form of intravenous (IV) access. It is considered to be a \"central\" line because the tip of the PICC ends in a large vein in your chest. This large vein is called the superior vena cava (SVC). The PICC tip ends in the SVC because there is a lot of blood flow in the SVC. This allows medicines and IV fluids to be quickly distributed throughout the body. The PICC is inserted using a sterile technique by a specially trained nurse or physician. After the PICC is inserted, a chest X-ray exam is done to be sure it is in the correct place.  A PICC may be placed for different reasons, such as:  · To give medicines and liquid nutrition that can only be given through a central line. Examples are:  ? Certain antibiotic treatments.  ? Chemotherapy.  ? Total parenteral nutrition (TPN).  · To take frequent blood samples.  · To give IV fluids and blood products.  · If there is difficulty placing a peripheral intravenous (PIV) catheter.    If taken care of properly, a PICC can remain in place for several months. A PICC can also allow a person to go home from the hospital early. Medicine and PICC care can be managed at home by a family member or home health care team.  What problems can happen when I have a PICC?  Problems with a PICC can occasionally occur. These may include the following:  · A blood clot (thrombus) forming in or at the tip of the PICC. This can cause the PICC to become clogged. A clot-dissolving medicine called tissue plasminogen activator (tPA) can be given through the PICC to help break up the clot.  · Inflammation of the vein (phlebitis) in which the PICC is placed. Signs of inflammation may include redness, pain at the insertion site, red streaks, or being able to feel a \"cord\" in the vein where the PICC is located.  · Infection in the PICC or at the insertion " site. Signs of infection may include fever, chills, redness, swelling, or pus drainage from the PICC insertion site.  · PICC movement (malposition). The PICC tip may move from its original position due to excessive physical activity, forceful coughing, sneezing, or vomiting.  · A break or cut in the PICC. It is important to not use scissors near the PICC.  · Nerve or tendon irritation or injury during PICC insertion.    What should I keep in mind about activities when I have a PICC?  · You may bend your arm and move it freely. If your PICC is near or at the bend of your elbow, avoid activity with repeated motion at the elbow.  · Rest at home for the remainder of the day following PICC line insertion.  · Avoid lifting heavy objects as instructed by your health care provider.  · Avoid using a crutch with the arm on the same side as your PICC. You may need to use a walker.  What should I know about my PICC dressing?  · Keep your PICC bandage (dressing) clean and dry to prevent infection.  ? Ask your health care provider when you may shower. Ask your health care provider to teach you how to wrap the PICC when you do take a shower.  · Change the PICC dressing as instructed by your health care provider.  · Change your PICC dressing if it becomes loose or wet.  What should I know about PICC care?  · Check the PICC insertion site daily for leakage, redness, swelling, or pain.  · Do not take a bath, swim, or use hot tubs when you have a PICC. Cover PICC line with clear plastic wrap and tape to keep it dry while showering.  · Flush the PICC as directed by your health care provider. Let your health care provider know right away if the PICC is difficult to flush or does not flush. Do not use force to flush the PICC.  · Do not use a syringe that is less than 10 mL to flush the PICC.  · Never pull or tug on the PICC.  · Avoid blood pressure checks on the arm with the PICC.  · Keep your PICC identification card with you at all  "times.  · Do not take the PICC out yourself. Only a trained clinical professional should remove the PICC.  Get help right away if:  · Your PICC is accidentally pulled all the way out. If this happens, cover the insertion site with a bandage or gauze dressing. Do not throw the PICC away. Your health care provider will need to inspect it.  · Your PICC was tugged or pulled and has partially come out. Do not  push the PICC back in.  · There is any type of drainage, redness, or swelling where the PICC enters the skin.  · You cannot flush the PICC, it is difficult to flush, or the PICC leaks around the insertion site when it is flushed.  · You hear a \"flushing\" sound when the PICC is flushed.  · You have pain, discomfort, or numbness in your arm, shoulder, or jaw on the same side as the PICC.  · You feel your heart \"racing\" or skipping beats.  · You notice a hole or tear in the PICC.  · You develop chills or a fever.  This information is not intended to replace advice given to you by your health care provider. Make sure you discuss any questions you have with your health care provider.  Document Released: 06/23/2004 Document Revised: 07/07/2017 Document Reviewed: 10/10/2014  Elsevier Interactive Patient Education © 2017 [employer]    "

## 2019-01-02 NOTE — NURSING NOTE
Pt dc'd home via courtesy cart.  Informed to come for infusion to [facility], verbalized good understanding. Pt denies any reaction to gentamycin

## 2019-01-02 NOTE — PROGRESS NOTES
"Pharmacokinetic Consult - Aminoglycoside Dosing (Initial Note)    Day 1 of planned 7 pharmacy dosing gentamicin for urinary tract infection per Dr. Ochoa's request.   Dosing method: extended-interval  Goal peak: 10-20 mg/L  Goal trough: <1mg/L      Relevant clinical data and objective history reviewed:  83 y.o. female 168 cm (66.14\")   67.6 kg (149 lb)   Ideal body weight: 59.6 kg (131 lb 7.2 oz)  Adjusted ideal body weight: 62.8 kg (138 lb 7.5 oz)      Estimated Creatinine Clearance: 57.9 mL/min (by C-G formula based on SCr of 0.8 mg/dL).  No intake/output data recorded.  Lab Results   Component Value Date    WBC 8.10 05/30/2018     Temp Readings from Last 3 Encounters:   01/02/19 97.1 °F (36.2 °C) (Oral)   05/30/18 97.9 °F (36.6 °C) (Oral)     Baseline culture/source/susceptibility:   12/10  Urine cx: >100K morganella morganii, susceptible to gentamicin      Assessment/Plan  - Pt received first dose of gentamicin 180mg IV x1 today (~2.9 mg/kg adjusted body weight) per Dr. Ochoa's order  - Will obtain serum creatinine and gentamicin trough level tomorrow; depending on gentamicin clearance will consider resuming at 5mg/kg Q24H to Q48H per [facility] Dosing Guidelines  - Will monitor serum creatinine every 24 hours for the first 3 days then at least every 48 hours per dosing recommendations.   - Pharmacy will continue to follow daily while on an aminoglycoside and adjust as needed.     Joselyn Robles, Pharmacy Intern  "

## 2019-01-02 NOTE — NURSING NOTE
IV Gentamycin finished, PICC line flushed with saline.  Pt to wait 30 min before dc to watch for adverse reaction to gent.  PICC covered with netting for comfort and safety

## 2019-01-03 ENCOUNTER — HOSPITAL ENCOUNTER (OUTPATIENT)
Dept: INFUSION THERAPY | Facility: HOSPITAL | Age: 84
Discharge: HOME | End: 2019-01-03
Admitting: UROLOGY

## 2019-01-03 VITALS
SYSTOLIC BLOOD PRESSURE: 138 MMHG | DIASTOLIC BLOOD PRESSURE: 74 MMHG | TEMPERATURE: 97.7 F | RESPIRATION RATE: 20 BRPM | HEART RATE: 84 BPM | OXYGEN SATURATION: 97 %

## 2019-01-03 DIAGNOSIS — N39.0 URINARY TRACT INFECTION WITH HEMATURIA, SITE UNSPECIFIED: ICD-10-CM

## 2019-01-03 DIAGNOSIS — R31.9 URINARY TRACT INFECTION WITH HEMATURIA, SITE UNSPECIFIED: ICD-10-CM

## 2019-01-03 DIAGNOSIS — N39.0 URINARY TRACT INFECTION WITHOUT HEMATURIA, SITE UNSPECIFIED: Primary | ICD-10-CM

## 2019-01-03 LAB
CREAT BLD-MCNC: 0.76 MG/DL (ref 0.57–1)
GENTAMICIN SERPL-MCNC: 0.48 MCG/ML (ref 0.5–2)
GFR SERPL CREATININE-BSD FRML MDRD: 73 ML/MIN/1.73

## 2019-01-03 PROCEDURE — 82565 ASSAY OF CREATININE: CPT | Performed by: UROLOGY

## 2019-01-03 PROCEDURE — 25010000002 GENTAMICIN PER 80 MG: Performed by: UROLOGY

## 2019-01-03 PROCEDURE — 80170 ASSAY OF GENTAMICIN: CPT | Performed by: UROLOGY

## 2019-01-03 PROCEDURE — 96365 THER/PROPH/DIAG IV INF INIT: CPT

## 2019-01-03 PROCEDURE — 36592 COLLECT BLOOD FROM PICC: CPT

## 2019-01-03 RX ADMIN — GENTAMICIN SULFATE 340 MG: 40 INJECTION, SOLUTION INTRAMUSCULAR; INTRAVENOUS at 17:20

## 2019-01-03 NOTE — PATIENT INSTRUCTIONS
Gentamicin, Sodium Chloride Solution for injection  What is this medicine?  GENTAMICIN (bucky ta MYE sin) is an aminoglycoside antibiotic. It is used to treat certain kinds of bacterial infections. It will not work for colds, flu, or other viral infections.  This medicine may be used for other purposes; ask your health care provider or pharmacist if you have questions.  What should I tell my health care provider before I take this medicine?  They need to know if you have any of these conditions:  -balance problems  -hearing problems  -kidney disease  -myasthenia gravis  -Parkinson's disease  -an unusual or allergic reaction to gentamicin, aminoglycosides, other medicines, sulfites, foods, dyes or preservatives  -pregnant or trying to get pregnant  -breast-feeding  How should I use this medicine?  This medicine is for infusion into a vein. It is usually given by a health care professional in a hospital or clinic setting.  If you get this medicine at home, you will be taught how to prepare and give this medicine. Use exactly as directed. Take your medicine at regular intervals. Do not take your medicine more often than directed. Take all of your medicine as directed even if you think you are better. Do not skip doses or stop your medicine early.  It is important that you put your used needles and syringes in a special sharps container. Do not put them in a trash can. If you do not have a sharps container, call your pharmacist or healthcare provider to get one.  Talk to your pediatrician regarding the use of this medicine in children. Special care may be needed.  Overdosage: If you think you have taken too much of this medicine contact a poison control center or emergency room at once.  NOTE: This medicine is only for you. Do not share this medicine with others.  What if I miss a dose?  If you miss a dose, use it as soon as you can. If it is almost time for your next dose, use only that dose. Do not use double or extra  doses.  What may interact with this medicine?  Do not take this medicine with any of the following medications:  -cidofovir  This medicine may also interact with the following medications:  -acyclovir  -amphotericin B  -birth control pills  -carbenicillin  -cisplatin  -colistin  -cyclosporine  -ethacrynic acid  -foscarnet  -furosemide  -ganciclovir  -medicines to open the lungs in premature newborns  -medicines used during surgery for sleep or muscle relaxation  -other antibiotics  -polymyxin  This list may not describe all possible interactions. Give your health care provider a list of all the medicines, herbs, non-prescription drugs, or dietary supplements you use. Also tell them if you smoke, drink alcohol, or use illegal drugs. Some items may interact with your medicine.  What should I watch for while using this medicine?  Your condition will be monitored carefully while you are receiving this medicine.  Tell your doctor or health care professional if you have any hearing problems or problems passing urine.  What side effects may I notice from receiving this medicine?  Side effects that you should report to your doctor or health care professional as soon as possible:  -allergic reactions like skin rash, itching or hives, swelling of the face, lips, or tongue  -breathing problems  -changes in hearing  -confused, dizzy, disoriented  -fever  -loss of balance  -muscle twitch  -numb, tingling pain  -redness, blistering, peeling or loosening of the skin, including inside the mouth  -seizures  -trouble passing urine or change in the amount of urine  -unusual bleeding or bruising  -unusually weak or tired  Side effects that usually do not require medical attention (report to your doctor or health care professional if they continue or are bothersome):  -diarrhea  -headache  -nausea, vomiting  -pain at site where injected  This list may not describe all possible side effects. Call your doctor for medical advice about side  effects. You may report side effects to FDA at 0-805-LGK-9016.  Where should I keep my medicine?  Keep out of the reach of children.  If you are using this medicine at home, you will be instructed on how to store this medicine. Throw away any unused medicine after the expiration date on the label.  NOTE: This sheet is a summary. It may not cover all possible information. If you have questions about this medicine, talk to your doctor, pharmacist, or health care provider.  © 2018 Elsevier/Gold Standard (2017-01-19 10:54:43)

## 2019-01-03 NOTE — PROGRESS NOTES
"Pharmacokinetic Consult - Gentamicin Dosing (Follow-up Note)  Day 2 of planned 7 pharmacy dosing gentamicin for urinary tract infection per Dr. Ochoa's request.   Dosing method: extended-interval  Goal peak: 10-20 mg/L  Goal trough: <1mg/L      Relevant clinical data and objective history reviewed:  83 y.o. female 168 cm (66.14\")   67.6 kg (149 lb)   Ideal body weight: 59.6 kg (131 lb 7.2 oz)  Adjusted ideal body weight: 62.8 kg (138 lb 7.5 oz)      Estimated Creatinine Clearance: 57.9 mL/min (by C-G formula based on SCr of 0.8 mg/dL).    She has a past medical history of Anxiety, Aortic valve insufficiency, Ataxia, Diastolic dysfunction, GERD (gastroesophageal reflux disease), H/O hernia repair, History of hip replacement, Hypertension, Insomnia, Mitral regurgitation, Parkinsons (CMS/HCC), Rheumatoid arthritis (CMS/HCC), Rotator cuff disorder, Spastic colon, Tremor, Tricuspid valve regurgitation, and UTI (urinary tract infection).    Allergies as of 01/03/2019 - Reviewed 01/03/2019   Allergen Reaction Noted   • Cimetidine Unknown (See Comments) 05/27/2018   • Codeine Itching 05/27/2018   • Doxycycline Hives 05/27/2018   • Guaifenesin & derivatives Unknown (See Comments) 05/27/2018   • Nitrofuran derivatives Itching 05/27/2018   • Oxaprozin Itching 05/27/2018   • Penicillins Itching 05/27/2018   • Sulfa antibiotics Rash 05/27/2018     Vital Signs (last 24 hours)       01/02 0700  -  01/03 0659 01/03 0700  -  01/03 1543   Most Recent    Temp (°F)     97.7     97.7 (36.5)    Heart Rate     84     84    Resp     20     20    BP     138/74     138/74    SpO2 (%)     97     97        Estimated Creatinine Clearance: 57.9 mL/min (by C-G formula based on SCr of 0.76 mg/dL).  Results from last 7 days   Lab Units  01/03/19   1508  01/02/19   1134   CREATININE mg/dL  0.76  0.80   1/3 1508 SCr 0.76    Baseline culture/source/susceptibility:   12/10  Urine cx: >100K Morganella morganii, susceptible to gentamicin    Recent " Gentamicin dosing history:  1/2 1140 Gentamicin 180mg IV x1 dose 1/2 at 1140  1/3 1508 Gentamicin trough level = 0.48 mcg/mL (~27hr level)    Assessment/Plan  1) Based on gentamicin trough of 0.48 (within goal of <1 mcg/mL) and stable renal function, will start gentamicin 340 mg (5 mg/kg, using actual body weight of 67.6 kg) IV Q24H  2) Plan for SCr daily x3 first 3 days of therapy followed by at least q48h checks until therapy concluded  3) Would advise to stay hydrated as allowed by MD. Monitor for signs and symptoms of nephrotoxicity and/or ototoxicity (may include tinnitus, roaring, ringing or “buzzing” in ears, or nausea/vomiting, dizziness, vertigo.    Pharmacy will continue to follow daily while on Gentamicin and adjust as needed.     Thanks, Roz Kwan, PharmD, BCPS

## 2019-01-04 ENCOUNTER — HOSPITAL ENCOUNTER (OUTPATIENT)
Dept: INFUSION THERAPY | Facility: HOSPITAL | Age: 84
Discharge: HOME | End: 2019-01-04
Admitting: UROLOGY

## 2019-01-04 VITALS
DIASTOLIC BLOOD PRESSURE: 74 MMHG | OXYGEN SATURATION: 95 % | TEMPERATURE: 97.5 F | HEART RATE: 71 BPM | SYSTOLIC BLOOD PRESSURE: 136 MMHG | RESPIRATION RATE: 16 BRPM

## 2019-01-04 DIAGNOSIS — N39.0 URINARY TRACT INFECTION WITHOUT HEMATURIA, SITE UNSPECIFIED: Primary | ICD-10-CM

## 2019-01-04 LAB
CREAT BLD-MCNC: 0.64 MG/DL (ref 0.57–1)
GENTAMICIN SERPL-MCNC: 1.5 MCG/ML (ref 0.5–2)
GFR SERPL CREATININE-BSD FRML MDRD: 89 ML/MIN/1.73

## 2019-01-04 PROCEDURE — 36592 COLLECT BLOOD FROM PICC: CPT

## 2019-01-04 PROCEDURE — 82565 ASSAY OF CREATININE: CPT | Performed by: UROLOGY

## 2019-01-04 PROCEDURE — 80170 ASSAY OF GENTAMICIN: CPT | Performed by: UROLOGY

## 2019-01-04 NOTE — PROGRESS NOTES
"Pharmacokinetic Consult - Gentamicin Dosing (Follow-up Note)  Day 3 of planned 7 pharmacy dosing gentamicin for urinary tract infection per Dr. Ochoa's request.   Dosing method: extended-interval  Goal peak: 10-20 mg/L  Goal trough: <1mg/L       Relevant clinical data and objective history reviewed:  83 y.o. female 168 cm (66.14\")   67.6 kg (149 lb)   Ideal body weight: 59.6 kg (131 lb 7.2 oz)  Adjusted ideal body weight: 62.8 kg (138 lb 7.5 oz)     She has a past medical history of Anxiety, Aortic valve insufficiency, Ataxia, Diastolic dysfunction, GERD (gastroesophageal reflux disease), H/O hernia repair, History of hip replacement, Hypertension, Insomnia, Mitral regurgitation, Parkinsons (CMS/HCC), Rheumatoid arthritis (CMS/HCC), Rotator cuff disorder, Spastic colon, Tremor, Tricuspid valve regurgitation, UTI (urinary tract infection), and Wears glasses.    Allergies as of 01/04/2019 - Reviewed 01/04/2019   Allergen Reaction Noted   • Cimetidine Unknown (See Comments) 05/27/2018   • Codeine Itching 05/27/2018   • Doxycycline Hives 05/27/2018   • Guaifenesin & derivatives Unknown (See Comments) 05/27/2018   • Nitrofuran derivatives Itching 05/27/2018   • Oxaprozin Itching 05/27/2018   • Penicillins Itching 05/27/2018   • Sulfa antibiotics Rash 05/27/2018     Vital Signs (last 24 hours)       01/03 0700  -  01/04 0659 01/04 0700  -  01/04 1330   Most Recent    Temp (°F)   97.7      97.5     97.5 (36.4)    Heart Rate   84      71     71    Resp   20      16     16    BP   138/74      136/74     136/74    SpO2 (%)   97      95     95        Estimated Creatinine Clearance: 57.9 mL/min (by C-G formula based on SCr of 0.64 mg/dL).  Results from last 7 days   Lab Units  01/04/19   1231  01/03/19   1508  01/02/19   1134   CREATININE mg/dL  0.64  0.76  0.80         Baseline culture/source/susceptibility:   12/10  Urine cx: >100K Morganella morganii, susceptible to gentamicin     Recent Gentamicin dosing history:  1/2 " 1140 Gentamicin 180mg IV x1 dose 1/2 at 1140    1/3 1508 Gentamicin trough = 0.48 mcg/mL (~27hr level)  1/3 1720 Gentamicin 340mg IV x1 dose    1/4 1231 Gentamicin trough = 1.5 mcg/mL    Assessment/Plan  1) Gentamicin trough level drawn ~ 19hr after 5mg/kg ActualBW dose (340mg) supratherapeutic at 1.5 mcg/ mL (target is undetectable level and at least < 1 mcg/mL)  -Given patient age and prior dosing/level history do not anticipate level collected at exactly 24hr arian today would be within target goal.  -As such, will not re-dose Gentamicin today and instead will plan for 5mg/kg q48h dosing for remainder of therapy (plan for another dose Saturday 1/5 and Monday 1/7/19).   2) Plan for SCr checks before each future dose of Gentamicin.  Will also recheck trough before 1/5 dose to confirm clearance.    3) Would advise to stay hydrated as allowed by MD. Monitor for signs and symptoms of nephrotoxicity and/or ototoxicity (may include tinnitus, roaring, ringing or “buzzing” in ears, or nausea/vomiting, dizziness, vertigo.    Pharmacy will continue to follow daily while on gentamicin and adjust as needed.     Thanks, London Topete, PharmD, BCPS

## 2019-01-04 NOTE — PROGRESS NOTES
PICC line flushed with 10 cc of normal saline and then 10cc of blood wasted before blood specimens obtained.  Then PICC flushed with 10cc of normal saline.

## 2019-01-05 ENCOUNTER — HOSPITAL ENCOUNTER (OUTPATIENT)
Dept: INFUSION THERAPY | Facility: HOSPITAL | Age: 84
Discharge: HOME | End: 2019-01-05
Admitting: UROLOGY

## 2019-01-05 VITALS
OXYGEN SATURATION: 95 % | TEMPERATURE: 96.9 F | HEART RATE: 73 BPM | DIASTOLIC BLOOD PRESSURE: 84 MMHG | SYSTOLIC BLOOD PRESSURE: 152 MMHG

## 2019-01-05 DIAGNOSIS — N39.0 URINARY TRACT INFECTION WITH HEMATURIA, SITE UNSPECIFIED: ICD-10-CM

## 2019-01-05 DIAGNOSIS — R31.9 URINARY TRACT INFECTION WITH HEMATURIA, SITE UNSPECIFIED: ICD-10-CM

## 2019-01-05 LAB
CREAT BLD-MCNC: 0.79 MG/DL (ref 0.57–1)
GENTAMICIN SERPL-MCNC: 0.3 MCG/ML (ref 0.5–2)
GFR SERPL CREATININE-BSD FRML MDRD: 70 ML/MIN/1.73

## 2019-01-05 PROCEDURE — 82565 ASSAY OF CREATININE: CPT | Performed by: UROLOGY

## 2019-01-05 PROCEDURE — 36592 COLLECT BLOOD FROM PICC: CPT

## 2019-01-05 PROCEDURE — 80170 ASSAY OF GENTAMICIN: CPT | Performed by: UROLOGY

## 2019-01-05 PROCEDURE — 96365 THER/PROPH/DIAG IV INF INIT: CPT

## 2019-01-05 PROCEDURE — 25010000002 GENTAMICIN PER 80 MG: Performed by: UROLOGY

## 2019-01-05 RX ADMIN — GENTAMICIN SULFATE 340 MG: 40 INJECTION, SOLUTION INTRAMUSCULAR; INTRAVENOUS at 10:32

## 2019-01-05 NOTE — PROGRESS NOTES
Pt tolerated infusion well with no concerns.  Pt is aware that there is no treatment scheduled for tomorrow - back to [facility] on Monday. Pt DC per wheelchair with family @ 8806.

## 2019-01-05 NOTE — PROGRESS NOTES
Pharmacokinetic Evaluation - Gentamicin    Trang Liu is a 83 y.o. female on gentamicin pharmacy to dose.  MRN: [id number]  : 1935    Day of therapy: 4  Indication: UTI  Consulted by: Dr. Ochoa  Goal trough: < 1 mg/L  Goal peak: 10-20 mg/L    Current dose: Gentamicin 340 mg (~ 5 mg/kg) IV q48h    Blood pressure 152/84, pulse 73, temperature 96.9 °F (36.1 °C), temperature source Oral, SpO2 95 %, not currently breastfeeding.  Results from last 7 days   Lab Units  19   0907  19   1231  19   1508   CREATININE mg/dL  0.79  0.64  0.76     Estimated Creatinine Clearance: 57.9 mL/min (by C-G formula based on SCr of 0.79 mg/dL).        Cultures/ labs/ radiology:   12/10 Urine cx: > 100K Morganella morganii, susceptible to gentamicin    Gentamicin dosing hx (include troughs if drawn):   1140 Gentamicin 180 mg IV x 1 dose    1/3 1508 trough = 0.48 mcg/ml (~27 hr level)  1/3 1720 Gentamicin 340 mg IV x 1 dose   1231 trough = 1.5 mcg/ml   0907 trough = 0.3 mcg/ml   Gentamicin 340 mg IV x 1 dose     Chart/Labs reviewed.  Renal function is stable.    Plan:  1. Give dose of Gentamicin 340 mg iv x 1 today, next dose scheduled for 19 for a q48h regimen. (target level is undetectable level and at least < 1 mcg/ml).  2. Creatinine on Monday    Encourage hydration as allowed by MD to prevent toxic accumulation. Monitor for signs and symptoms of toxicity or intolerance including rash, increase in Scr or decrease in UOP and/or ototoxicity (roaring, dizziness, ringing, and/or buzzing in ears).     Pharmacy will continue to follow and adjust as needed.    Alyssa Calderón, PharmD, BCPS

## 2019-01-06 ENCOUNTER — APPOINTMENT (OUTPATIENT)
Dept: INFUSION THERAPY | Facility: HOSPITAL | Age: 84
End: 2019-01-06

## 2019-01-07 ENCOUNTER — HOSPITAL ENCOUNTER (OUTPATIENT)
Dept: INFUSION THERAPY | Facility: HOSPITAL | Age: 84
Discharge: HOME | End: 2019-01-07
Admitting: UROLOGY

## 2019-01-07 VITALS
HEART RATE: 67 BPM | DIASTOLIC BLOOD PRESSURE: 76 MMHG | OXYGEN SATURATION: 94 % | SYSTOLIC BLOOD PRESSURE: 141 MMHG | RESPIRATION RATE: 20 BRPM | TEMPERATURE: 97.7 F

## 2019-01-07 DIAGNOSIS — R31.9 URINARY TRACT INFECTION WITH HEMATURIA, SITE UNSPECIFIED: Primary | ICD-10-CM

## 2019-01-07 DIAGNOSIS — N39.0 URINARY TRACT INFECTION WITH HEMATURIA, SITE UNSPECIFIED: Primary | ICD-10-CM

## 2019-01-07 LAB
CREAT BLD-MCNC: 0.88 MG/DL (ref 0.57–1)
GENTAMICIN SERPL-MCNC: 0.5 MCG/ML (ref 0.5–2)
GFR SERPL CREATININE-BSD FRML MDRD: 62 ML/MIN/1.73

## 2019-01-07 PROCEDURE — 82565 ASSAY OF CREATININE: CPT | Performed by: UROLOGY

## 2019-01-07 PROCEDURE — 80170 ASSAY OF GENTAMICIN: CPT | Performed by: UROLOGY

## 2019-01-07 PROCEDURE — 96365 THER/PROPH/DIAG IV INF INIT: CPT

## 2019-01-07 PROCEDURE — G0463 HOSPITAL OUTPT CLINIC VISIT: HCPCS

## 2019-01-07 PROCEDURE — 25010000002 GENTAMICIN PER 80 MG: Performed by: UROLOGY

## 2019-01-07 PROCEDURE — 36592 COLLECT BLOOD FROM PICC: CPT

## 2019-01-07 RX ADMIN — GENTAMICIN SULFATE 340 MG: 40 INJECTION, SOLUTION INTRAMUSCULAR; INTRAVENOUS at 13:31

## 2019-01-07 NOTE — PROGRESS NOTES
"Pharmacokinetic Consult - Gentamicin Dosing (Follow-up Note)  Day 6 of planned 7 pharmacy dosing gentamicin for urinary tract infection per Dr. Jack's request.   Dosing method: extended-interval  Goal peak: 10-20 mg/L  Goal trough: <1mg/L       Relevant clinical data and objective history reviewed:  83 y.o. female 168 cm (66.14\")   67.6 kg (149 lb)   Ideal body weight: 59.6 kg (131 lb 7.2 oz)  Adjusted ideal body weight: 62.8 kg (138 lb 7.5 oz)    She has a past medical history of Anxiety, Aortic valve insufficiency, Ataxia, Diastolic dysfunction, GERD (gastroesophageal reflux disease), H/O hernia repair, History of hip replacement, Hypertension, Insomnia, Mitral regurgitation, Parkinsons (CMS/HCC), Rheumatoid arthritis (CMS/HCC), Rotator cuff disorder, Spastic colon, Tremor, Tricuspid valve regurgitation, UTI (urinary tract infection), and Wears glasses.    Allergies as of 01/07/2019 - Reviewed 01/07/2019   Allergen Reaction Noted   • Cimetidine Unknown (See Comments) 05/27/2018   • Codeine Itching 05/27/2018   • Doxycycline Hives 05/27/2018   • Guaifenesin & derivatives Unknown (See Comments) 05/27/2018   • Nitrofuran derivatives Itching 05/27/2018   • Oxaprozin Itching 05/27/2018   • Penicillins Itching 05/27/2018   • Sulfa antibiotics Rash 05/27/2018     Vital Signs (last 24 hours)       01/06 0700  -  01/07 0659 01/07 0700  -  01/07 1345   Most Recent    Temp (°F)     97.7     97.7 (36.5)    Heart Rate     67     67    Resp     20     20    BP     141/76     141/76    SpO2 (%)     94     94        Estimated Creatinine Clearance: 52.6 mL/min (by C-G formula based on SCr of 0.88 mg/dL).  Results from last 7 days   Lab Units  01/07/19   1244  01/05/19   0907  01/04/19   1231   CREATININE mg/dL  0.88  0.79  0.64         Cultures/ labs/ radiology:   12/10 Urine cx: > 100K Morganella morganii, susceptible to gentamicin     Gentamicin dosing hx (include troughs if drawn):  1/2 1140 Gentamicin 180 mg IV x 1 dose         "       1/3 1508 trough = 0.48 mcg/ml (~27 hr level)  1/3 1720 Gentamicin 340 mg IV x 1 dose  1/4 1231 trough = 1.5 mcg/ml  1/5 0907 trough = 0.3 mcg/ml  1/5 1032 Gentamicin 340 mg IV x 1 dose     1/7 1244 trough = 0.5 mcg/mL  1/7 1331 Gentamicin 340mg IV x1 dose    Assessment/Plan  Gentamicin target level is undetectable level and at least < 1 mcg/mL.  Current level = 0.5 mcg/mL    Plan for 340mg IV x1 dose today to complete 7 day course of Gentamicin therapy.      Pharmacy will assist further if reconsulted.      Thanks, London Topete, PharmD, BCPS

## 2019-01-07 NOTE — PROGRESS NOTES
1227-RN spoke with pharmacist David regarding labs due today.  1336-Lab results called to David mason at 1330 and Gentamicin started.   1342-RN called office, left message to receive call back from MD regarding patient's PICC line as today's dose of Gentamicin to be patient's last per David mason. Awaiting return call.  1450-No return call yet received. DAVID PICC dressing changed as charted. Patient D/C'd from ACU via wheelchair with daughter at 1450.

## 2019-01-08 ENCOUNTER — APPOINTMENT (OUTPATIENT)
Dept: INFUSION THERAPY | Facility: HOSPITAL | Age: 84
End: 2019-01-08

## 2019-01-14 ENCOUNTER — HOSPITAL ENCOUNTER (OUTPATIENT)
Dept: INFUSION THERAPY | Facility: HOSPITAL | Age: 84
Discharge: HOME | End: 2019-01-14

## 2019-01-15 ENCOUNTER — HOSPITAL ENCOUNTER (OUTPATIENT)
Dept: INFUSION THERAPY | Facility: HOSPITAL | Age: 84
Discharge: HOME | End: 2019-01-15
Admitting: UROLOGY

## 2019-01-15 VITALS
HEART RATE: 77 BPM | DIASTOLIC BLOOD PRESSURE: 78 MMHG | SYSTOLIC BLOOD PRESSURE: 130 MMHG | RESPIRATION RATE: 16 BRPM | OXYGEN SATURATION: 95 % | TEMPERATURE: 97.3 F

## 2019-01-15 DIAGNOSIS — R31.9 URINARY TRACT INFECTION WITH HEMATURIA, SITE UNSPECIFIED: ICD-10-CM

## 2019-01-15 DIAGNOSIS — N39.0 URINARY TRACT INFECTION WITH HEMATURIA, SITE UNSPECIFIED: ICD-10-CM

## 2019-01-15 LAB
BILIRUB UR QL STRIP: NEGATIVE
CLARITY UR: CLEAR
COLOR UR: YELLOW
GLUCOSE UR STRIP-MCNC: NEGATIVE MG/DL
HGB UR QL STRIP.AUTO: NEGATIVE
KETONES UR QL STRIP: NEGATIVE
LEUKOCYTE ESTERASE UR QL STRIP.AUTO: NEGATIVE
NITRITE UR QL STRIP: NEGATIVE
PH UR STRIP.AUTO: 5.5 [PH] (ref 5–8)
PROT UR QL STRIP: NEGATIVE
SP GR UR STRIP: 1.01 (ref 1–1.03)
UROBILINOGEN UR QL STRIP: NORMAL

## 2019-01-15 PROCEDURE — 81003 URINALYSIS AUTO W/O SCOPE: CPT | Performed by: UROLOGY

## 2019-01-15 PROCEDURE — G0463 HOSPITAL OUTPT CLINIC VISIT: HCPCS

## 2019-01-15 NOTE — PROGRESS NOTES
Patient tolerated PICC dressing change. RN called MD office regarding patient and daughter stating no urine culture collected since antibiotics completed last week. Urine sample sent to lab as ordered per Dr. Jack. Patient ambulatory with cane, D/C'd from ACU via wheelchair with daughter to main entrance at 1526.

## 2019-01-22 ENCOUNTER — HOSPITAL ENCOUNTER (OUTPATIENT)
Dept: INFUSION THERAPY | Facility: HOSPITAL | Age: 84
Discharge: HOME | End: 2019-01-22
Admitting: NEUROLOGICAL SURGERY

## 2019-01-22 VITALS
DIASTOLIC BLOOD PRESSURE: 76 MMHG | HEART RATE: 96 BPM | SYSTOLIC BLOOD PRESSURE: 141 MMHG | OXYGEN SATURATION: 96 % | RESPIRATION RATE: 16 BRPM | TEMPERATURE: 97.4 F

## 2019-01-22 DIAGNOSIS — N39.0 URINARY TRACT INFECTION WITH HEMATURIA, SITE UNSPECIFIED: Primary | ICD-10-CM

## 2019-01-22 DIAGNOSIS — R31.9 URINARY TRACT INFECTION WITH HEMATURIA, SITE UNSPECIFIED: Primary | ICD-10-CM

## 2019-01-22 PROCEDURE — G0463 HOSPITAL OUTPT CLINIC VISIT: HCPCS

## 2019-01-22 NOTE — PATIENT INSTRUCTIONS
PICC Removal  A peripherally inserted central catheter (PICC) is a long, thin, flexible tube that a health care provider can insert into a vein in your upper arm. It is a type of IV. Having a PICC in place gives health care providers quick access to your veins. It is a good way to distribute medicines and fluids quickly throughout your body.  Tell a health care provider about:  · Any allergies you have.  · All medicines you are taking, including vitamins, herbs, eye drops, creams, and over-the-counter medicines.  · Any problems you or family members have had with anesthetic medicines.  · Any blood disorders you have.  · Any surgeries you have had.  · Any medical conditions you have.  What are the risks?  Generally, this is a safe procedure. However, as with any procedure, problems can occur. Possible problems include:  · Bleeding.  · Infection.    What happens before the procedure?  You need an order from your health care provider to have your PICC removed. Only a health care provider trained in PICC removal should take it out. You may have your PICC removed in the hospital or in an outpatient setting.  What happens during the procedure?  Having a PICC removed is usually painless. Removal of the tape that holds the PICC in place may be the most uncomfortable part. Do not take out the PICC yourself. Only a trained clinical professional, such as a PICC nurse, should remove it. If your health care provider thinks your PICC is infected, the tip may be sent to the lab for testing.  After taking out your PICC, your health care provider may:  · Hold gentle pressure on the exit site.  · Apply some antibiotic ointment.  · Place a small bandage over the insertion site.    What happens after the procedure?  You should be able to remove the bandage after 24 hours. Follow all your health care provider's instructions.  · Keep the insertion site clean by washing it gently with soap and water.  · Do not pick or remove a  scab.  · Avoid strenuous physical activity for a day or two.  · Let your health care provider know if you develop redness, soreness, bleeding, swelling, or drainage from the insertion site.  · Let your health care provider know if you develop chills or fever.    This information is not intended to replace advice given to you by your health care provider. Make sure you discuss any questions you have with your health care provider.  Document Released: 06/07/2011 Document Revised: 05/25/2017 Document Reviewed: 10/10/2014  [employer] Interactive Patient Education © 2017 [employer]

## 2019-02-06 ENCOUNTER — APPOINTMENT (OUTPATIENT)
Dept: GENERAL RADIOLOGY | Facility: HOSPITAL | Age: 84
End: 2019-02-06

## 2019-02-06 ENCOUNTER — HOSPITAL ENCOUNTER (EMERGENCY)
Facility: HOSPITAL | Age: 84
Discharge: HOME | End: 2019-02-06
Attending: EMERGENCY MEDICINE | Admitting: EMERGENCY MEDICINE

## 2019-02-06 VITALS
RESPIRATION RATE: 18 BRPM | SYSTOLIC BLOOD PRESSURE: 121 MMHG | BODY MASS INDEX: 22.66 KG/M2 | HEIGHT: 66 IN | DIASTOLIC BLOOD PRESSURE: 98 MMHG | OXYGEN SATURATION: 99 % | WEIGHT: 141 LBS | HEART RATE: 78 BPM | TEMPERATURE: 97.7 F

## 2019-02-06 DIAGNOSIS — S70.02XA CONTUSION OF LEFT HIP, INITIAL ENCOUNTER: Primary | ICD-10-CM

## 2019-02-06 DIAGNOSIS — S39.012A STRAIN OF LUMBAR REGION, INITIAL ENCOUNTER: ICD-10-CM

## 2019-02-06 PROCEDURE — 99284 EMERGENCY DEPT VISIT MOD MDM: CPT

## 2019-02-06 PROCEDURE — 73502 X-RAY EXAM HIP UNI 2-3 VIEWS: CPT

## 2019-02-06 PROCEDURE — 72110 X-RAY EXAM L-2 SPINE 4/>VWS: CPT

## 2019-02-06 RX ORDER — ACETAMINOPHEN 500 MG
1000 TABLET ORAL ONCE
Status: COMPLETED | OUTPATIENT
Start: 2019-02-06 | End: 2019-02-06

## 2019-02-06 RX ADMIN — ACETAMINOPHEN 1000 MG: 500 TABLET, FILM COATED ORAL at 20:09

## 2019-02-06 NOTE — ED TRIAGE NOTES
Pt was attempting to walk to the bathroom when she fell landing on laminate. Pt c/o generalized pain. C/o left hip upon ems arrival to house. Denies hitting head. No blood thinners. No shortening or rotation

## 2019-02-07 NOTE — ED NOTES
Patient provided discharge instructions at this time. Pt put in paper scrubs for departure and provided box lunch. Respirations are even and unlabored, chest rise and fall is equal in expansion.  All patients questions answered prior to departure from the ED.  Pt whelled from ED to PV by this nurse, capillary refill within normal limit, speech normal.       Laurence Lim, RN  02/06/19 0261

## 2019-02-07 NOTE — ED PROVIDER NOTES
EMERGENCY DEPARTMENT ENCOUNTER    CHIEF COMPLAINT  Chief Complaint: Hip Pain  History given by: pt and family  History limited by: nothing  Room Number: 19/19  PMD: Nayla Higginbotham APRN      HPI:  Pt is a 83 y.o. female who presents complaining of hip pain due to mechanical fall ambulating to restroom earlier today. Pt states pain radiates to lumbar back but denies hitting head or LOC. Pt family states that she has hx of Parkinson's disease. Per family, pt ambulates with walker as needed, but does not ambulate with walker from bedroom to bathroom.     Duration:  Unspecified, occurred PTA today  Onset: sudden  Timing: constant  Location: Left hip   Radiation: lower back  Quality: pain  Intensity/Severity: moderate  Progression: unchanged   Associated Symptoms: none   Aggravating Factors: movement  Alleviating Factors: none  Previous Episodes: none  Treatment before arrival: none     PAST MEDICAL HISTORY  Active Ambulatory Problems     Diagnosis Date Noted   • Upper GI bleed 05/27/2018   • Diastolic dysfunction 05/27/2018   • GERD (gastroesophageal reflux disease) 05/27/2018   • Hypertension 05/27/2018   • Parkinsons (CMS/[facility]) 05/27/2018   • Rheumatoid arthritis (CMS/[facility]) 05/27/2018   • Anemia, posthemorrhagic, acute 05/28/2018   • Gastroesophageal reflux disease 05/29/2018   • Urinary tract infection, site not specified 12/18/2018   • Rheumatoid arthritis (CMS/[facility]) 01/02/2019     Resolved Ambulatory Problems     Diagnosis Date Noted   • No Resolved Ambulatory Problems     Past Medical History:   Diagnosis Date   • Anxiety    • Aortic valve insufficiency    • Ataxia    • Diastolic dysfunction    • GERD (gastroesophageal reflux disease)    • H/O hernia repair    • History of hip replacement    • Hypertension    • Insomnia    • Mitral regurgitation    • Parkinsons (CMS/[facility])    • Rheumatoid arthritis (CMS/[facility])    • Rotator cuff disorder    • Spastic colon    • Tremor    • Tricuspid valve regurgitation    • UTI  (urinary tract infection)    • Wears glasses        PAST SURGICAL HISTORY  Past Surgical History:   Procedure Laterality Date   • APPENDECTOMY     • CATARACT EXTRACTION     • ENDOSCOPY N/A 5/30/2018    LA Grade B reflux esophagitis, HH    • HERNIA REPAIR     • HIP SURGERY Left    • HYSTERECTOMY     • REPLACEMENT TOTAL KNEE BILATERAL         FAMILY HISTORY  History reviewed. No pertinent family history.    SOCIAL HISTORY  Social History     Socioeconomic History   • Marital status:      Spouse name: Not on file   • Number of children: Not on file   • Years of education: Not on file   • Highest education level: Not on file   Social Needs   • Financial resource strain: Not on file   • Food insecurity - worry: Not on file   • Food insecurity - inability: Not on file   • Transportation needs - medical: Not on file   • Transportation needs - non-medical: Not on file   Occupational History   • Not on file   Tobacco Use   • Smoking status: Never Smoker   • Smokeless tobacco: Never Used   Substance and Sexual Activity   • Alcohol use: No   • Drug use: No   • Sexual activity: Defer   Other Topics Concern   • Not on file   Social History Narrative   • Not on file       ALLERGIES  Cimetidine; Codeine; Doxycycline; Guaifenesin & derivatives; Nitrofuran derivatives; Oxaprozin; Penicillins; and Sulfa antibiotics    REVIEW OF SYSTEMS  Review of Systems   Constitutional: Negative for fever.   HENT: Negative for sore throat.    Eyes: Negative.    Respiratory: Negative for cough and shortness of breath.    Cardiovascular: Negative for chest pain.   Gastrointestinal: Negative for abdominal pain, diarrhea and vomiting.   Genitourinary: Negative for dysuria.   Musculoskeletal: Positive for back pain (lumbar ) and myalgias (L hip ). Negative for neck pain.   Skin: Negative for rash.   Neurological: Negative for weakness, numbness and headaches.   Hematological: Negative.    Psychiatric/Behavioral: Negative.    All other systems  reviewed and are negative.      PHYSICAL EXAM  ED Triage Vitals [02/06/19 1900]   Temp Heart Rate Resp BP SpO2   97.7 °F (36.5 °C) 82 16 127/77 99 %      Temp src Heart Rate Source Patient Position BP Location FiO2 (%)   Oral Monitor Lying -- --       Physical Exam   Constitutional: She is oriented to person, place, and time. No distress.   HENT:   Head: Normocephalic and atraumatic.   Eyes: EOM are normal. Pupils are equal, round, and reactive to light.   Neck: Normal range of motion. Neck supple.   Cardiovascular: Normal rate, regular rhythm and normal heart sounds.   Pulmonary/Chest: Effort normal and breath sounds normal. No respiratory distress.   Abdominal: Soft. There is no tenderness. There is no rebound and no guarding.   Musculoskeletal: She exhibits no edema.        Right hip: She exhibits normal range of motion, no bony tenderness and no deformity.        Left hip: She exhibits tenderness (TTP). She exhibits normal range of motion and no deformity.        Lumbar back: She exhibits tenderness (L lower spine). She exhibits no bony tenderness (no midline).   No shortening of BLE   Neurological: She is alert and oriented to person, place, and time. She has normal sensation and normal strength.   Skin: Skin is warm and dry. No rash noted.   Psychiatric: Mood and affect normal.   Nursing note and vitals reviewed.      RADIOLOGY  XR Spine Lumbar 4+ View   Final Result   There are extensive degenerative disc changes at every lumbar spine disc  level with marked disc space narrowing and vacuum phenomenon within some  of the discs.. The degenerative disc changes result in moderate  dextroscoliosis centered in the lower lumbar region. There is a  compression fracture of the T12 vertebral body producing approximately  60% maximal loss of height anteriorly that is chronic and unchanged  since the CT. No other definite fractures are identified.   XR Hip With or Without Pelvis 2 - 3 View Left   Final Result    A left  total hip arthroplasty is in place and appears in satisfactory  position and unchanged since the postoperative film from 2012. There is  no evidence of loosening or fracture or dislocation.        I ordered the above noted radiological studies. Interpreted by radiologist. Reviewed by me in PACS.     Procedures      PROGRESS AND CONSULTS   1913: XR of hip ordered for for further evaluation.      1938: Upon pt exam, discussed plan to XR hip and spine, as well as plan to give Tylenol for pain management.     1939: XR L spine ordered for further evaluation. Tylenol ordered for pain management.     2039  Rechecked pt, resting comfortably in NAD. Informed pt of the XR results which were unremarkable. D/w pt the plan to discharge with symptomatic management. Pt understands and agrees with plan, all questions answered.       MEDICAL DECISION MAKING  Results were reviewed/discussed with the patient and they were also made aware of online access. Pt also made aware that some labs, such as cultures, will not be resulted during ER visit and follow up with PMD is necessary.     MDM  Number of Diagnoses or Management Options     Amount and/or Complexity of Data Reviewed  Tests in the radiology section of CPT®: ordered and reviewed (XR Hip: unremarkable   XR Lumbar Spine: unremarkable)           DIAGNOSIS  Final diagnoses:   Contusion of left hip, initial encounter   Strain of lumbar region, initial encounter       DISPOSITION  DISCHARGE    Patient discharged in stable condition.    Reviewed implications of results, diagnosis, meds, responsibility to follow up, warning signs and symptoms of possible worsening, potential complications and reasons to return to ER.    Patient/Family voiced understanding of above instructions.    Discussed plan for discharge, as there is no emergent indication for admission. Patient referred to primary care provider for BP management due to today's BP. Pt/family is agreeable and understands need for  follow up and repeat testing.  Pt is aware that discharge does not mean that nothing is wrong but it indicates no emergency is present that requires admission and they must continue care with follow-up as given below or physician of their choice.     FOLLOW-UP  Nayla Higginbotham, APRN  [address]  [facility] [address] [phone]               Medication List      No changes were made to your prescriptions during this visit.           Latest Documented Vital Signs:  As of 8:41 PM  BP- 149/88 HR- 86 Temp- 97.7 °F (36.5 °C) (Oral) O2 sat- 99%    --  Documentation assistance provided by alesia Wood for Dr. Madden.  Information recorded by the scribe was done at my direction and has been verified and validated by me.     Documentation assistance provided by alesia Ross for Dr. Madden.  Information recorded by the scribe was done at my direction and has been verified and validated by me.                Unique Ross  02/06/19 2120       Melvin Madden MD  02/08/19 2914

## 2019-02-12 ENCOUNTER — HOSPITAL ENCOUNTER (INPATIENT)
Facility: HOSPITAL | Age: 84
LOS: 6 days | Discharge: NURSING, REHAB OR LONG-TERM CARE | End: 2019-02-18
Attending: EMERGENCY MEDICINE | Admitting: INTERNAL MEDICINE

## 2019-02-12 ENCOUNTER — APPOINTMENT (OUTPATIENT)
Dept: CT IMAGING | Facility: HOSPITAL | Age: 84
End: 2019-02-12

## 2019-02-12 DIAGNOSIS — R26.89 DECREASED MOBILITY: ICD-10-CM

## 2019-02-12 DIAGNOSIS — I48.91 ATRIAL FIBRILLATION, UNSPECIFIED TYPE (HCC): ICD-10-CM

## 2019-02-12 DIAGNOSIS — E87.6 HYPOKALEMIA: Primary | ICD-10-CM

## 2019-02-12 PROBLEM — R10.9 ABDOMINAL PAIN: Status: ACTIVE | Noted: 2019-02-12

## 2019-02-12 PROBLEM — F41.9 ANXIETY: Status: ACTIVE | Noted: 2019-02-12

## 2019-02-12 LAB
ALBUMIN SERPL-MCNC: 4.1 G/DL (ref 3.5–5.2)
ALBUMIN/GLOB SERPL: 1.6 G/DL
ALP SERPL-CCNC: 58 U/L (ref 39–117)
ALT SERPL W P-5'-P-CCNC: 6 U/L (ref 1–33)
ANION GAP SERPL CALCULATED.3IONS-SCNC: 12.6 MMOL/L
ANION GAP SERPL CALCULATED.3IONS-SCNC: 16 MMOL/L
AST SERPL-CCNC: 9 U/L (ref 1–32)
BASOPHILS # BLD AUTO: 0.05 10*3/MM3 (ref 0–0.2)
BASOPHILS NFR BLD AUTO: 0.6 % (ref 0–1.5)
BILIRUB SERPL-MCNC: 0.8 MG/DL (ref 0.1–1.2)
BILIRUB UR QL STRIP: NEGATIVE
BUN BLD-MCNC: 11 MG/DL (ref 8–23)
BUN BLD-MCNC: 14 MG/DL (ref 8–23)
BUN/CREAT SERPL: 17.2 (ref 7–25)
BUN/CREAT SERPL: 20.6 (ref 7–25)
CALCIUM SPEC-SCNC: 10 MG/DL (ref 8.6–10.5)
CALCIUM SPEC-SCNC: 9.1 MG/DL (ref 8.6–10.5)
CHLORIDE SERPL-SCNC: 90 MMOL/L (ref 98–107)
CHLORIDE SERPL-SCNC: 96 MMOL/L (ref 98–107)
CLARITY UR: CLEAR
CO2 SERPL-SCNC: 30.4 MMOL/L (ref 22–29)
CO2 SERPL-SCNC: 32 MMOL/L (ref 22–29)
COLOR UR: YELLOW
CREAT BLD-MCNC: 0.64 MG/DL (ref 0.57–1)
CREAT BLD-MCNC: 0.68 MG/DL (ref 0.57–1)
DEPRECATED RDW RBC AUTO: 47 FL (ref 37–54)
EOSINOPHIL # BLD AUTO: 0.06 10*3/MM3 (ref 0–0.4)
EOSINOPHIL NFR BLD AUTO: 0.7 % (ref 0.3–6.2)
ERYTHROCYTE [DISTWIDTH] IN BLOOD BY AUTOMATED COUNT: 13.8 % (ref 12.3–15.4)
GFR SERPL CREATININE-BSD FRML MDRD: 83 ML/MIN/1.73
GFR SERPL CREATININE-BSD FRML MDRD: 89 ML/MIN/1.73
GLOBULIN UR ELPH-MCNC: 2.6 GM/DL
GLUCOSE BLD-MCNC: 102 MG/DL (ref 65–99)
GLUCOSE BLD-MCNC: 108 MG/DL (ref 65–99)
GLUCOSE UR STRIP-MCNC: NEGATIVE MG/DL
HCT VFR BLD AUTO: 40.5 % (ref 34–46.6)
HGB BLD-MCNC: 13.8 G/DL (ref 12–15.9)
HGB UR QL STRIP.AUTO: NEGATIVE
HOLD SPECIMEN: NORMAL
HOLD SPECIMEN: NORMAL
IMM GRANULOCYTES # BLD AUTO: 0.02 10*3/MM3 (ref 0–0.05)
IMM GRANULOCYTES NFR BLD AUTO: 0.2 % (ref 0–0.5)
KETONES UR QL STRIP: ABNORMAL
LEUKOCYTE ESTERASE UR QL STRIP.AUTO: NEGATIVE
LIPASE SERPL-CCNC: 20 U/L (ref 13–60)
LYMPHOCYTES # BLD AUTO: 2.01 10*3/MM3 (ref 0.7–3.1)
LYMPHOCYTES NFR BLD AUTO: 23.8 % (ref 19.6–45.3)
MAGNESIUM SERPL-MCNC: 1.7 MG/DL (ref 1.6–2.4)
MCH RBC QN AUTO: 31.8 PG (ref 26.6–33)
MCHC RBC AUTO-ENTMCNC: 34.1 G/DL (ref 31.5–35.7)
MCV RBC AUTO: 93.3 FL (ref 79–97)
MONOCYTES # BLD AUTO: 0.97 10*3/MM3 (ref 0.1–0.9)
MONOCYTES NFR BLD AUTO: 11.5 % (ref 5–12)
NEUTROPHILS # BLD AUTO: 5.34 10*3/MM3 (ref 1.4–7)
NEUTROPHILS NFR BLD AUTO: 63.2 % (ref 42.7–76)
NITRITE UR QL STRIP: NEGATIVE
NRBC BLD AUTO-RTO: 0 /100 WBC (ref 0–0)
PH UR STRIP.AUTO: 7.5 [PH] (ref 5–8)
PLATELET # BLD AUTO: 188 10*3/MM3 (ref 140–450)
PMV BLD AUTO: 12.6 FL (ref 6–12)
POTASSIUM BLD-SCNC: 2.3 MMOL/L (ref 3.5–5.2)
POTASSIUM BLD-SCNC: 2.7 MMOL/L (ref 3.5–5.2)
PROT SERPL-MCNC: 6.7 G/DL (ref 6–8.5)
PROT UR QL STRIP: NEGATIVE
RBC # BLD AUTO: 4.34 10*6/MM3 (ref 3.77–5.28)
SODIUM BLD-SCNC: 138 MMOL/L (ref 136–145)
SODIUM BLD-SCNC: 139 MMOL/L (ref 136–145)
SP GR UR STRIP: >=1.03 (ref 1–1.03)
TROPONIN T SERPL-MCNC: <0.01 NG/ML (ref 0–0.03)
TROPONIN T SERPL-MCNC: <0.01 NG/ML (ref 0–0.03)
TSH SERPL DL<=0.05 MIU/L-ACNC: 1.82 MIU/ML (ref 0.27–4.2)
UROBILINOGEN UR QL STRIP: ABNORMAL
WBC NRBC COR # BLD: 8.45 10*3/MM3 (ref 3.4–10.8)
WHOLE BLOOD HOLD SPECIMEN: NORMAL
WHOLE BLOOD HOLD SPECIMEN: NORMAL

## 2019-02-12 PROCEDURE — 83690 ASSAY OF LIPASE: CPT | Performed by: EMERGENCY MEDICINE

## 2019-02-12 PROCEDURE — 84484 ASSAY OF TROPONIN QUANT: CPT | Performed by: HOSPITALIST

## 2019-02-12 PROCEDURE — 84443 ASSAY THYROID STIM HORMONE: CPT | Performed by: HOSPITALIST

## 2019-02-12 PROCEDURE — 99285 EMERGENCY DEPT VISIT HI MDM: CPT

## 2019-02-12 PROCEDURE — 85025 COMPLETE CBC W/AUTO DIFF WBC: CPT | Performed by: EMERGENCY MEDICINE

## 2019-02-12 PROCEDURE — 25010000002 IOPAMIDOL 61 % SOLUTION: Performed by: NURSE PRACTITIONER

## 2019-02-12 PROCEDURE — 99204 OFFICE O/P NEW MOD 45 MIN: CPT | Performed by: INTERNAL MEDICINE

## 2019-02-12 PROCEDURE — 83735 ASSAY OF MAGNESIUM: CPT | Performed by: NURSE PRACTITIONER

## 2019-02-12 PROCEDURE — 93005 ELECTROCARDIOGRAM TRACING: CPT | Performed by: NURSE PRACTITIONER

## 2019-02-12 PROCEDURE — G0378 HOSPITAL OBSERVATION PER HR: HCPCS

## 2019-02-12 PROCEDURE — 80053 COMPREHEN METABOLIC PANEL: CPT | Performed by: EMERGENCY MEDICINE

## 2019-02-12 PROCEDURE — 94664 DEMO&/EVAL PT USE INHALER: CPT

## 2019-02-12 PROCEDURE — 25010000003 POTASSIUM CHLORIDE 10 MEQ/100ML SOLUTION: Performed by: NURSE PRACTITIONER

## 2019-02-12 PROCEDURE — 93010 ELECTROCARDIOGRAM REPORT: CPT | Performed by: INTERNAL MEDICINE

## 2019-02-12 PROCEDURE — 84484 ASSAY OF TROPONIN QUANT: CPT | Performed by: NURSE PRACTITIONER

## 2019-02-12 PROCEDURE — 81003 URINALYSIS AUTO W/O SCOPE: CPT | Performed by: EMERGENCY MEDICINE

## 2019-02-12 PROCEDURE — 74177 CT ABD & PELVIS W/CONTRAST: CPT

## 2019-02-12 PROCEDURE — 93005 ELECTROCARDIOGRAM TRACING: CPT | Performed by: HOSPITALIST

## 2019-02-12 PROCEDURE — 36415 COLL VENOUS BLD VENIPUNCTURE: CPT | Performed by: HOSPITALIST

## 2019-02-12 PROCEDURE — 94640 AIRWAY INHALATION TREATMENT: CPT

## 2019-02-12 PROCEDURE — 25010000002 ONDANSETRON PER 1 MG: Performed by: HOSPITALIST

## 2019-02-12 RX ORDER — POTASSIUM CHLORIDE 7.45 MG/ML
10 INJECTION INTRAVENOUS
Status: DISCONTINUED | OUTPATIENT
Start: 2019-02-12 | End: 2019-02-18 | Stop reason: HOSPADM

## 2019-02-12 RX ORDER — SODIUM CHLORIDE 0.9 % (FLUSH) 0.9 %
3 SYRINGE (ML) INJECTION EVERY 12 HOURS SCHEDULED
Status: DISCONTINUED | OUTPATIENT
Start: 2019-02-12 | End: 2019-02-18 | Stop reason: HOSPADM

## 2019-02-12 RX ORDER — NITROGLYCERIN 0.4 MG/1
0.4 TABLET SUBLINGUAL
Status: DISCONTINUED | OUTPATIENT
Start: 2019-02-12 | End: 2019-02-18 | Stop reason: HOSPADM

## 2019-02-12 RX ORDER — ONDANSETRON 4 MG/1
4 TABLET, ORALLY DISINTEGRATING ORAL EVERY 6 HOURS PRN
Status: DISCONTINUED | OUTPATIENT
Start: 2019-02-12 | End: 2019-02-18 | Stop reason: HOSPADM

## 2019-02-12 RX ORDER — POTASSIUM CHLORIDE 750 MG/1
40 CAPSULE, EXTENDED RELEASE ORAL AS NEEDED
Status: DISCONTINUED | OUTPATIENT
Start: 2019-02-12 | End: 2019-02-18 | Stop reason: HOSPADM

## 2019-02-12 RX ORDER — ACETAMINOPHEN 500 MG
1000 TABLET ORAL ONCE
Status: COMPLETED | OUTPATIENT
Start: 2019-02-12 | End: 2019-02-12

## 2019-02-12 RX ORDER — POTASSIUM CHLORIDE 750 MG/1
40 CAPSULE, EXTENDED RELEASE ORAL DAILY
Status: DISCONTINUED | OUTPATIENT
Start: 2019-02-13 | End: 2019-02-18 | Stop reason: HOSPADM

## 2019-02-12 RX ORDER — CHOLECALCIFEROL (VITAMIN D3) 125 MCG
1000 CAPSULE ORAL DAILY
Status: DISCONTINUED | OUTPATIENT
Start: 2019-02-12 | End: 2019-02-18 | Stop reason: HOSPADM

## 2019-02-12 RX ORDER — ONDANSETRON 2 MG/ML
4 INJECTION INTRAMUSCULAR; INTRAVENOUS EVERY 6 HOURS PRN
Status: DISCONTINUED | OUTPATIENT
Start: 2019-02-12 | End: 2019-02-18 | Stop reason: HOSPADM

## 2019-02-12 RX ORDER — POTASSIUM CHLORIDE 7.45 MG/ML
10 INJECTION INTRAVENOUS ONCE
Status: COMPLETED | OUTPATIENT
Start: 2019-02-12 | End: 2019-02-12

## 2019-02-12 RX ORDER — MELATONIN
1000 DAILY
Status: DISCONTINUED | OUTPATIENT
Start: 2019-02-12 | End: 2019-02-18 | Stop reason: HOSPADM

## 2019-02-12 RX ORDER — BUDESONIDE AND FORMOTEROL FUMARATE DIHYDRATE 160; 4.5 UG/1; UG/1
2 AEROSOL RESPIRATORY (INHALATION)
Status: DISCONTINUED | OUTPATIENT
Start: 2019-02-12 | End: 2019-02-18 | Stop reason: HOSPADM

## 2019-02-12 RX ORDER — AMLODIPINE BESYLATE 10 MG/1
10 TABLET ORAL DAILY
Status: DISCONTINUED | OUTPATIENT
Start: 2019-02-12 | End: 2019-02-18 | Stop reason: HOSPADM

## 2019-02-12 RX ORDER — FLUTICASONE PROPIONATE 50 MCG
2 SPRAY, SUSPENSION (ML) NASAL DAILY
Status: DISCONTINUED | OUTPATIENT
Start: 2019-02-12 | End: 2019-02-18 | Stop reason: HOSPADM

## 2019-02-12 RX ORDER — PAROXETINE HYDROCHLORIDE 20 MG/1
20 TABLET, FILM COATED ORAL EVERY MORNING
Status: DISCONTINUED | OUTPATIENT
Start: 2019-02-13 | End: 2019-02-18 | Stop reason: HOSPADM

## 2019-02-12 RX ORDER — ONDANSETRON 4 MG/1
4 TABLET, FILM COATED ORAL EVERY 6 HOURS PRN
Status: DISCONTINUED | OUTPATIENT
Start: 2019-02-12 | End: 2019-02-18 | Stop reason: HOSPADM

## 2019-02-12 RX ORDER — FUROSEMIDE 40 MG/1
40 TABLET ORAL DAILY
Status: DISCONTINUED | OUTPATIENT
Start: 2019-02-12 | End: 2019-02-18 | Stop reason: HOSPADM

## 2019-02-12 RX ORDER — SODIUM CHLORIDE 0.9 % (FLUSH) 0.9 %
10 SYRINGE (ML) INJECTION AS NEEDED
Status: DISCONTINUED | OUTPATIENT
Start: 2019-02-12 | End: 2019-02-18 | Stop reason: HOSPADM

## 2019-02-12 RX ORDER — SUCRALFATE ORAL 1 G/10ML
1 SUSPENSION ORAL 4 TIMES DAILY
Status: DISCONTINUED | OUTPATIENT
Start: 2019-02-12 | End: 2019-02-18 | Stop reason: HOSPADM

## 2019-02-12 RX ORDER — PANTOPRAZOLE SODIUM 40 MG/1
40 TABLET, DELAYED RELEASE ORAL DAILY
Status: DISCONTINUED | OUTPATIENT
Start: 2019-02-12 | End: 2019-02-18 | Stop reason: HOSPADM

## 2019-02-12 RX ORDER — CHLORDIAZEPOXIDE HYDROCHLORIDE 5 MG/1
5 CAPSULE, GELATIN COATED ORAL 3 TIMES DAILY PRN
Status: DISCONTINUED | OUTPATIENT
Start: 2019-02-12 | End: 2019-02-18 | Stop reason: HOSPADM

## 2019-02-12 RX ORDER — ACETAMINOPHEN 325 MG/1
650 TABLET ORAL EVERY 4 HOURS PRN
Status: DISCONTINUED | OUTPATIENT
Start: 2019-02-12 | End: 2019-02-18 | Stop reason: HOSPADM

## 2019-02-12 RX ORDER — SODIUM CHLORIDE 0.9 % (FLUSH) 0.9 %
3-10 SYRINGE (ML) INJECTION AS NEEDED
Status: DISCONTINUED | OUTPATIENT
Start: 2019-02-12 | End: 2019-02-18 | Stop reason: HOSPADM

## 2019-02-12 RX ORDER — HYDROCODONE BITARTRATE AND ACETAMINOPHEN 5; 325 MG/1; MG/1
1 TABLET ORAL EVERY 8 HOURS PRN
Status: DISCONTINUED | OUTPATIENT
Start: 2019-02-12 | End: 2019-02-14

## 2019-02-12 RX ORDER — POTASSIUM CHLORIDE 1.5 G/1.77G
40 POWDER, FOR SOLUTION ORAL AS NEEDED
Status: DISCONTINUED | OUTPATIENT
Start: 2019-02-12 | End: 2019-02-18 | Stop reason: HOSPADM

## 2019-02-12 RX ORDER — ACETAMINOPHEN 500 MG
1000 TABLET ORAL EVERY 6 HOURS PRN
COMMUNITY

## 2019-02-12 RX ORDER — POTASSIUM CHLORIDE 750 MG/1
40 CAPSULE, EXTENDED RELEASE ORAL ONCE
Status: COMPLETED | OUTPATIENT
Start: 2019-02-12 | End: 2019-02-12

## 2019-02-12 RX ORDER — CARBOXYMETHYLCELLULOSE SODIUM 10 MG/ML
1 GEL OPHTHALMIC 4 TIMES DAILY PRN
Status: DISCONTINUED | OUTPATIENT
Start: 2019-02-12 | End: 2019-02-18 | Stop reason: HOSPADM

## 2019-02-12 RX ADMIN — ONDANSETRON HYDROCHLORIDE 4 MG: 2 SOLUTION INTRAMUSCULAR; INTRAVENOUS at 16:21

## 2019-02-12 RX ADMIN — POTASSIUM CHLORIDE 10 MEQ: 7.46 INJECTION, SOLUTION INTRAVENOUS at 08:35

## 2019-02-12 RX ADMIN — FUROSEMIDE 40 MG: 40 TABLET ORAL at 18:59

## 2019-02-12 RX ADMIN — ACETAMINOPHEN 650 MG: 325 TABLET, FILM COATED ORAL at 18:06

## 2019-02-12 RX ADMIN — IOPAMIDOL 85 ML: 612 INJECTION, SOLUTION INTRAVENOUS at 07:20

## 2019-02-12 RX ADMIN — POTASSIUM CHLORIDE 10 MEQ: 7.46 INJECTION, SOLUTION INTRAVENOUS at 07:37

## 2019-02-12 RX ADMIN — PANTOPRAZOLE SODIUM 40 MG: 40 TABLET, DELAYED RELEASE ORAL at 18:58

## 2019-02-12 RX ADMIN — POTASSIUM CHLORIDE 40 MEQ: 750 CAPSULE, EXTENDED RELEASE ORAL at 16:21

## 2019-02-12 RX ADMIN — ACETAMINOPHEN 1000 MG: 500 TABLET, FILM COATED ORAL at 10:25

## 2019-02-12 RX ADMIN — SODIUM CHLORIDE, PRESERVATIVE FREE 3 ML: 5 INJECTION INTRAVENOUS at 21:36

## 2019-02-12 RX ADMIN — POTASSIUM CHLORIDE 40 MEQ: 750 CAPSULE, EXTENDED RELEASE ORAL at 10:14

## 2019-02-12 RX ADMIN — BUDESONIDE AND FORMOTEROL FUMARATE DIHYDRATE 2 PUFF: 160; 4.5 AEROSOL RESPIRATORY (INHALATION) at 20:31

## 2019-02-12 RX ADMIN — SODIUM CHLORIDE 1000 ML: 9 INJECTION, SOLUTION INTRAVENOUS at 07:34

## 2019-02-12 RX ADMIN — CARBIDOPA AND LEVODOPA 2 TABLET: 25; 100 TABLET ORAL at 18:07

## 2019-02-12 RX ADMIN — AMLODIPINE BESYLATE 10 MG: 10 TABLET ORAL at 18:58

## 2019-02-12 NOTE — H&P
"HISTORY AND PHYSICAL   [facility]        Patient Identification:  Name: Trang Liu  Age: 83 y.o.  Sex: female  :  1935  MRN: [id number]                     Primary Care Physician: Nayla Higginbotham APRN    Chief Complaint:  Abd pain.    History of Present Illness:   Pleasant 83-year-old female presents emergency room complaining of lower abdominal pain which she attributed to constipation.  She had stated she had not had a bowel movement 6 days.  Workup was unremarkable including CT scan abdomen including no evidence of any unusual fecal retention.  At present the patient is a longer complaining of lower abdominal pain.  Incidentally noted during a workup however was a severe hypokalemia and atrial fibrillation of unknown duration of which the patient was unaware.  No fever sweats or chills.  No change in her urinary habits.  On my questioning she had not noticed any recent change in her bladder habits or bowel habits as opposed to the lack of a bowel movement 6 days as she had reported previously.  She does have a history of a \"spastic colon\".  She does have both Librium and narcotics on her medication list.  At present she is her only acute complaint is \"heartburn from those pills they gave me\".      Past Medical History:  Past Medical History:   Diagnosis Date   • Anxiety    • Aortic valve insufficiency    • Ataxia    • Diastolic dysfunction    • GERD (gastroesophageal reflux disease)    • H/O hernia repair     umbilical   • History of hip replacement     left   • Hypertension    • Insomnia    • Mitral regurgitation    • Parkinsons (CMS/HCC)    • Rheumatoid arthritis (CMS/HCC)    • Rotator cuff disorder     left side, also old fracture, doesn';t use this arm due to pain   • Spastic colon    • Tremor    • Tricuspid valve regurgitation    • UTI (urinary tract infection)     frequent   • Wears glasses      Past Surgical History:  Past Surgical History:   Procedure Laterality Date "   • APPENDECTOMY     • CATARACT EXTRACTION     • ENDOSCOPY N/A 2018    LA Grade B reflux esophagitis, HH    • HERNIA REPAIR     • HIP SURGERY Left    • HYSTERECTOMY     • REPLACEMENT TOTAL KNEE BILATERAL        Home Meds:    (Not in a hospital admission)    Allergies:  Allergies   Allergen Reactions   • Cimetidine Unknown (See Comments)     unknown   • Codeine Itching   • Doxycycline Hives   • Guaifenesin & Derivatives Unknown (See Comments)     Unknown     • Nitrofuran Derivatives Itching   • Oxaprozin Itching   • Penicillins Itching     unknown   • Sulfa Antibiotics Rash     And itching     Immunizations:    There is no immunization history on file for this patient.  Social History:   Social History     Social History Narrative   • Not on file     Social History     Tobacco Use   • Smoking status: Never Smoker   • Smokeless tobacco: Never Used   Substance Use Topics   • Alcohol use: No     Family History:  History reviewed. No pertinent family history.     Review of Systems  Review of Systems   Constitutional: Negative.    HENT: Negative.    Eyes: Negative.    Respiratory: Negative.    Cardiovascular: Negative.    Gastrointestinal:        As per history of present illness.   Endocrine: Negative.    Genitourinary: Negative.    Musculoskeletal: Negative.    Skin: Negative.    Allergic/Immunologic: Negative.    Neurological: Negative.    Hematological: Negative.    Psychiatric/Behavioral: Negative.        Objective:  tMax 24 hrs: Temp (24hrs), Av.7 °F (37.1 °C), Min:98.7 °F (37.1 °C), Max:98.7 °F (37.1 °C)    Vitals Ranges:   Temp:  [98.7 °F (37.1 °C)] 98.7 °F (37.1 °C)  Heart Rate:  [75-96] 96  Resp:  [18] 18  BP: (120-180)/() 142/63      Exam:  Physical Exam   Constitutional: She is oriented to person, place, and time. She appears well-developed and well-nourished. No distress.   HENT:   Head: Normocephalic and atraumatic.   Right Ear: External ear normal.   Left Ear: External ear normal.   Nose:  Nose normal.   Mouth/Throat: Oropharynx is clear and moist.   Eyes: Conjunctivae and EOM are normal. Right eye exhibits no discharge. Left eye exhibits no discharge. No scleral icterus.   Neck: Neck supple. No tracheal deviation present. No thyromegaly present.   Cardiovascular: Normal rate. Exam reveals no gallop and no friction rub.   Murmur (1-2/6 syst precordial ) heard.  Irregular rhythm.   Pulmonary/Chest: Effort normal and breath sounds normal. No respiratory distress. She exhibits no tenderness.   Abdominal: Soft. Bowel sounds are normal. She exhibits no distension and no mass. There is no tenderness. There is no rebound and no guarding.   Musculoskeletal: She exhibits no edema.   Neurological: She is alert and oriented to person, place, and time. No cranial nerve deficit. She exhibits normal muscle tone. Coordination normal.   Skin: Skin is warm and dry. She is not diaphoretic.   Psychiatric: She has a normal mood and affect. Her behavior is normal. Judgment and thought content normal.       Data Review:  All labs and radiology reviewed.    Assessment:    Hypokalemia:  Probably secondary to Lasix.  We'll initiate potassium chloride protocol and monitor closely.    Atrial fibrillation - unknown duration, rate controlled:  Cardiology consultation, recheck echocardiogram, check thyroid panel etc.    Abdominal pain:  None at present with benign workup.    Anxiety      Plan:  Please see above.    Romel Scruggs MD  2/12/2019  12:54 PM    EMR Dragon/Transcription disclaimer:   Much of this encounter note is an electronic transcription/translation of spoken language to printed text. The electronic translation of spoken language may permit erroneous, or at times, nonsensical words or phrases to be inadvertently transcribed; Although I have reviewed the note for such errors, some may still exist.

## 2019-02-12 NOTE — ED NOTES
Pt resting quietly, family members at bedside. Lights dimmed for pt comfort. Pt and pt family aware of ED hold status.      Wendy Mac RN  02/12/19 1211

## 2019-02-12 NOTE — ED NOTES
Pt sitting in bed eating breakfast meal with daughter at bedside. Pt informed will give medications and preform post void bladder scan when she has completed eating. Pt verbalized understanding.      Wendy Mac RN  02/12/19 0907

## 2019-02-12 NOTE — CONSULTS
Patient Name: Trang Liu  :1935  83 y.o.    Date of Admission: 2019  Date of Consultation:  19  Encounter Provider: Tammi Al RN  Place of Service: [facility] CARDIOLOGY  Referring Provider: Satnam Laboy MD  Patient Care Team:  Nayla Higginbotham APRN as PCP - General (Family Medicine)      Chief complaint: abdominal pain    Consulted for: atrial fibrillation    CHADsVASc score:     History of Present Illness:    83-year old female with a history of hypertension, diastolic dysfunction, mild aortic stenosis chronic lower extremity edema and rheumatoid arthritis who presents emergency room complaining of abdominal pain and constipation associated with some nausea and vomiting.  She said she fell about 6 days ago.  She does not remember anything about it but said she fell and hit very hard.  Patient had been followed by [place] heart specialist in the past and was seen a little less than a year ago.  At that time she had chronic dyspnea on exertion some nonspecific EKG changes and normal ejection fraction with mild left ear and mild MR.  Patient says she is not been having any type of chest discomfort.  Cardiac Testing:  Echo 2016    Conclusions:  Global left ventricular wall motion and contractility are within normal  limits.  The estimated ejection fraction is 60-65%.   The tissue Doppler is indeterminate  Abnormal left ventricular diastolic filling is observed, consistent with  impaired relaxation.   Moderate aortic leaflet calcification is visualized.  The aortic valve is trileaflet.  The aortic valve leaflets are severely thickened with reduced systolic  excursion. Mildly reduced excursion.  There is mild aortic stenosis.  There is a trace of aortic regurgitation.  The mean gradient of the aortic valve is 7 mmHg.   The aortic valve area, by VTI's, is calculated at 1.87 cm2.   There is severe mitral annular calcification.  There is trivial to  mild mitral regurgitation observed.  The tricuspid valve leaflets are mildly thickened.There is a localized  thickening at the coaptation point of the tricuspid valve leaflets on  the apical 4 chamber view. No change from 2015 echo. Not visualized in  the RV parasternal view or the subcostal views.  There is mild tricuspid regurgitation.  The right ventricular systolic pressure is calculated at 30 mmHg.     Past Medical History:   Diagnosis Date   • Anxiety    • Aortic valve insufficiency    • Ataxia    • Diastolic dysfunction    • GERD (gastroesophageal reflux disease)    • H/O hernia repair     umbilical   • History of hip replacement     left   • Hypertension    • Insomnia    • Mitral regurgitation    • Parkinsons (CMS/HCC)    • Rheumatoid arthritis (CMS/HCC)    • Rotator cuff disorder     left side, also old fracture, doesn';t use this arm due to pain   • Spastic colon    • Tremor    • Tricuspid valve regurgitation    • UTI (urinary tract infection)     frequent   • Wears glasses        Past Surgical History:   Procedure Laterality Date   • APPENDECTOMY     • CATARACT EXTRACTION     • ENDOSCOPY N/A 5/30/2018    LA Grade B reflux esophagitis, HH    • HERNIA REPAIR     • HIP SURGERY Left    • HYSTERECTOMY     • REPLACEMENT TOTAL KNEE BILATERAL           Prior to Admission medications    Medication Sig Start Date End Date Taking? Authorizing Provider   acetaminophen (TYLENOL) 325 MG tablet Take 650 mg by mouth Every 6 (Six) Hours As Needed for Mild Pain .    ProviderDavid MD   amLODIPine (NORVASC) 10 MG tablet Take 10 mg by mouth Daily.    ProviderDavid MD   Artificial Tear Ointment (DRY EYES OP) Apply  to eye 2 (Two) Times a Day.    ProviderDavid MD   carbidopa-levodopa (SINEMET)  MG per tablet Take 2 tablets by mouth 4 (Four) Times a Day.    ProviderDavid MD   chlordiazePOXIDE (LIBRIUM) 5 MG capsule Take 5 mg by mouth 3 (Three) Times a Day As Needed for Anxiety.    Provider  MD David   fluticasone (FLONASE) 50 MCG/ACT nasal spray 2 sprays into each nostril Daily.    David Rivera MD   fluticasone-salmeterol (ADVAIR DISKUS) 250-50 MCG/DOSE DISKUS Inhale 1 puff 2 (Two) Times a Day.    David Rivera MD   furosemide (LASIX) 40 MG tablet Take 40 mg by mouth Daily.    David Rivera MD   HYDROcodone-acetaminophen (NORCO) 5-325 MG per tablet Take 1 tablet by mouth Every 8 (Eight) Hours As Needed.    David Rivera MD   pantoprazole (PROTONIX) 40 MG EC tablet Take 1 tablet by mouth Daily. 5/30/18   Isreal Barron MD   PARoxetine (PAXIL) 20 MG tablet Take 20 mg by mouth Every Morning.    David Rivera MD   potassium chloride (K-DUR) 10 MEQ CR tablet Take 10 mEq by mouth Daily.    David Rivera MD   sucralfate (CARAFATE) 1 GM/10ML suspension Take 10 mL by mouth 4 (Four) Times a Day. 5/30/18   Isreal Barron MD   vitamin B-12 (CYANOCOBALAMIN) 1000 MCG tablet Take 1 tablet by mouth Daily. 5/30/18   Isreal Barron MD   vitamin D (ERGOCALCIFEROL) 88096 units capsule capsule Take 50,000 Units by mouth Every 7 (Seven) Days.    David Rivera MD       Allergies   Allergen Reactions   • Cimetidine Unknown (See Comments)     unknown   • Codeine Itching   • Doxycycline Hives   • Guaifenesin & Derivatives Unknown (See Comments)     Unknown     • Nitrofuran Derivatives Itching   • Oxaprozin Itching   • Penicillins Itching     unknown   • Sulfa Antibiotics Rash     And itching       Social History     Socioeconomic History   • Marital status:      Spouse name: Not on file   • Number of children: Not on file   • Years of education: Not on file   • Highest education level: Not on file   Tobacco Use   • Smoking status: Never Smoker   • Smokeless tobacco: Never Used   Substance and Sexual Activity   • Alcohol use: No   • Drug use: No   • Sexual activity: Defer       History reviewed. No pertinent family history.    REVIEW OF  SYSTEMS:   All systems reviewed.  Pertinent positives identified in HPI.  All other systems are negative.      Objective:     Vitals:    02/12/19 0835 02/12/19 0935 02/12/19 1004 02/12/19 1030   BP: 120/69 177/78 145/100    BP Location:  Left arm     Patient Position: Sitting Sitting Sitting    Pulse: 81 86 79 90   Resp: 18      Temp:       TempSrc:       SpO2: 94% 94% 93% 94%   Weight:       Height:         Body mass index is 21.99 kg/m².    General Appearance:    Alert, cooperative, in no acute distress   Head:    Normocephalic, without obvious abnormality, atraumatic   Eyes:            Lids and lashes normal, conjunctivae and sclerae normal, no   icterus, no pallor, corneas clear, PERRLA   Ears:    Ears appear intact with no abnormalities noted   Throat:   No oral lesions, no thrush, oral mucosa moist   Neck:   No adenopathy, supple, trachea midline, no thyromegaly, no   carotid bruit, no JVD   Back:     No kyphosis present, no scoliosis present, no skin lesions, erythema or scars, no tenderness to percussion or palpation, range of motion normal   Lungs:     Clear to auscultation,respirations regular, even and unlabored    Heart:    Regular rhythm and normal rate, normal S1 and S2, no murmur, no gallop, no rub, no click   Chest Wall:    No abnormalities observed   Abdomen:     Normal bowel sounds, no masses, no organomegaly, soft        non-tender, non-distended, no guarding, no rebound  tenderness   Extremities:   Moves all extremities well, no edema, no cyanosis, no redness   Pulses:   Pulses palpable and equal bilaterally. Normal radial, carotid, femoral, dorsalis pedis and posterior tibial pulses bilaterally. Normal abdominal aorta   Skin:  Psychiatric:   No bleeding, bruising or rash    Alert and oriented x 3, normal mood and affect   Lab Review:     Results from last 7 days   Lab Units 02/12/19  0612   SODIUM mmol/L 138   POTASSIUM mmol/L 2.3*   CHLORIDE mmol/L 90*   CO2 mmol/L 32.0*   BUN mg/dL 14    CREATININE mg/dL 0.68   CALCIUM mg/dL 10.0   BILIRUBIN mg/dL 0.8   ALK PHOS U/L 58   ALT (SGPT) U/L 6   AST (SGOT) U/L 9   GLUCOSE mg/dL 102*         Results from last 7 days   Lab Units 02/12/19  0612   WBC 10*3/mm3 8.45   HEMOGLOBIN g/dL 13.8   HEMATOCRIT % 40.5   PLATELETS 10*3/mm3 188         Results from last 7 days   Lab Units 02/12/19  0612   MAGNESIUM mg/dL 1.7       EKG: pending            I personally viewed and interpreted the patient's EKG/Telemetry data.        Assessment and Plan:   1.  New onset atrial fibrillation.  Heart rates currently controlled we will check an echocardiogram and follow.  2.  Abdominal pain other physicians workup in progress.  3.  Chronic dyspnea patient says has not changed.  4.  Patient denies chest discomfort.  Will follow      Tammi Al RN  02/12/19  10:49 AM

## 2019-02-12 NOTE — ED PROVIDER NOTES
EMERGENCY DEPARTMENT ENCOUNTER    CHIEF COMPLAINT  Chief Complaint: abdominal pain  History given by:patient  History limited by:none  Time Seen: 0613  Room Number: 37/37  PMD: Nayla Higginbotham APRN      HPI:  Pt is a 83 y.o. female who presents with lower abdominal pain w/ associated constipation (last BM 6 days), nausea, and vomiting. She denies diarrhea, fever, cough, congestion, sore throat,  sx, vaginal bleeding or discharge, CP, and SOA. Sx are worse w/ movement and was not alleviated after using enema at home. Pt advised sx began after a mechanical fall 6 days ago. She was evaluated at that time for injuries and none were found. Hx of multiple hernia surgeries.     Duration: 6 days  Timing:constant  Location:lower abd  Radiation:none  Quality:'pain'  Intensity/Severity:moderate  Progression: unchanged  Associated Symptoms: nausea, vomiting, constipation  Aggravating Factors:none  Alleviating Factors:none  Previous Episodes: none  Treatment before arrival:enema    PAST MEDICAL HISTORY  Active Ambulatory Problems     Diagnosis Date Noted   • Upper GI bleed 05/27/2018   • Diastolic dysfunction 05/27/2018   • GERD (gastroesophageal reflux disease) 05/27/2018   • Hypertension 05/27/2018   • Parkinsons (CMS/[facility]) 05/27/2018   • Rheumatoid arthritis (CMS/[facility]) 05/27/2018   • Anemia, posthemorrhagic, acute 05/28/2018   • Gastroesophageal reflux disease 05/29/2018   • Urinary tract infection, site not specified 12/18/2018   • Rheumatoid arthritis (CMS/[facility]) 01/02/2019     Resolved Ambulatory Problems     Diagnosis Date Noted   • No Resolved Ambulatory Problems     Past Medical History:   Diagnosis Date   • Anxiety    • Aortic valve insufficiency    • Ataxia    • Diastolic dysfunction    • GERD (gastroesophageal reflux disease)    • H/O hernia repair    • History of hip replacement    • Hypertension    • Insomnia    • Mitral regurgitation    • Parkinsons (CMS/[facility])    • Rheumatoid arthritis (CMS/[facility])    • Rotator  cuff disorder    • Spastic colon    • Tremor    • Tricuspid valve regurgitation    • UTI (urinary tract infection)    • Wears glasses        PAST SURGICAL HISTORY  Past Surgical History:   Procedure Laterality Date   • APPENDECTOMY     • CATARACT EXTRACTION     • ENDOSCOPY N/A 5/30/2018    LA Grade B reflux esophagitis, HH    • HERNIA REPAIR     • HIP SURGERY Left    • HYSTERECTOMY     • REPLACEMENT TOTAL KNEE BILATERAL         FAMILY HISTORY  History reviewed. No pertinent family history.    SOCIAL HISTORY  Social History     Socioeconomic History   • Marital status:      Spouse name: Not on file   • Number of children: Not on file   • Years of education: Not on file   • Highest education level: Not on file   Social Needs   • Financial resource strain: Not on file   • Food insecurity - worry: Not on file   • Food insecurity - inability: Not on file   • Transportation needs - medical: Not on file   • Transportation needs - non-medical: Not on file   Occupational History   • Not on file   Tobacco Use   • Smoking status: Never Smoker   • Smokeless tobacco: Never Used   Substance and Sexual Activity   • Alcohol use: No   • Drug use: No   • Sexual activity: Defer   Other Topics Concern   • Not on file   Social History Narrative   • Not on file         ALLERGIES  Cimetidine; Codeine; Doxycycline; Guaifenesin & derivatives; Nitrofuran derivatives; Oxaprozin; Penicillins; and Sulfa antibiotics    REVIEW OF SYSTEMS  Review of Systems   Constitutional: Negative.  Negative for activity change, appetite change ( decreased), chills, fatigue and fever.   HENT: Negative.  Negative for congestion, ear pain, rhinorrhea and sore throat.    Eyes: Negative.    Respiratory: Negative.  Negative for cough and shortness of breath.    Cardiovascular: Negative.  Negative for chest pain, palpitations and leg swelling ( pedal).   Gastrointestinal: Positive for abdominal pain, constipation and nausea. Negative for diarrhea and vomiting.    Endocrine: Negative.    Genitourinary: Negative.  Negative for decreased urine volume, difficulty urinating, dysuria, menstrual problem, pelvic pain, urgency and vaginal discharge.   Musculoskeletal: Negative.  Negative for back pain.   Skin: Negative.  Negative for rash.   Allergic/Immunologic: Negative.    Neurological: Negative.  Negative for dizziness, weakness, light-headedness, numbness and headaches.   Hematological: Negative.    Psychiatric/Behavioral: Negative.  The patient is not nervous/anxious.    All other systems reviewed and are negative.      PHYSICAL EXAM  ED Triage Vitals [02/12/19 0252]   Temp Heart Rate Resp BP SpO2   98.7 °F (37.1 °C) 84 18 180/90 99 %      Temp src Heart Rate Source Patient Position BP Location FiO2 (%)   Tympanic -- -- -- --       Physical Exam   Constitutional: She is oriented to person, place, and time.  Non-toxic appearance.   HENT:   Head: Normocephalic and atraumatic.   Eyes: EOM are normal.   Neck: Normal range of motion. Neck supple.   Cardiovascular: Normal rate, regular rhythm and normal heart sounds.   No murmur heard.  Pulmonary/Chest: Effort normal and breath sounds normal. No respiratory distress.   Abdominal: Soft. Bowel sounds are normal. There is tenderness (lower abdomen). There is no rebound and no guarding.   Musculoskeletal: Normal range of motion. She exhibits no edema.   Neurological: She is alert and oriented to person, place, and time.   Skin: Skin is warm and dry.   Psychiatric: Affect normal.   Nursing note and vitals reviewed.      LAB RESULTS  Recent Results (from the past 24 hour(s))   Comprehensive Metabolic Panel    Collection Time: 02/12/19  6:12 AM   Result Value Ref Range    Glucose 102 (H) 65 - 99 mg/dL    BUN 14 8 - 23 mg/dL    Creatinine 0.68 0.57 - 1.00 mg/dL    Sodium 138 136 - 145 mmol/L    Potassium 2.3 (C) 3.5 - 5.2 mmol/L    Chloride 90 (L) 98 - 107 mmol/L    CO2 32.0 (H) 22.0 - 29.0 mmol/L    Calcium 10.0 8.6 - 10.5 mg/dL    Total  Protein 6.7 6.0 - 8.5 g/dL    Albumin 4.10 3.50 - 5.20 g/dL    ALT (SGPT) 6 1 - 33 U/L    AST (SGOT) 9 1 - 32 U/L    Alkaline Phosphatase 58 39 - 117 U/L    Total Bilirubin 0.8 0.1 - 1.2 mg/dL    eGFR Non African Amer 83 >60 mL/min/1.73    Globulin 2.6 gm/dL    A/G Ratio 1.6 g/dL    BUN/Creatinine Ratio 20.6 7.0 - 25.0    Anion Gap 16.0 mmol/L   Lipase    Collection Time: 02/12/19  6:12 AM   Result Value Ref Range    Lipase 20 13 - 60 U/L   Light Blue Top    Collection Time: 02/12/19  6:12 AM   Result Value Ref Range    Extra Tube hold for add-on    Green Top (Gel)    Collection Time: 02/12/19  6:12 AM   Result Value Ref Range    Extra Tube Hold for add-ons.    Lavender Top    Collection Time: 02/12/19  6:12 AM   Result Value Ref Range    Extra Tube hold for add-on    Gold Top - SST    Collection Time: 02/12/19  6:12 AM   Result Value Ref Range    Extra Tube Hold for add-ons.    CBC Auto Differential    Collection Time: 02/12/19  6:12 AM   Result Value Ref Range    WBC 8.45 3.40 - 10.80 10*3/mm3    RBC 4.34 3.77 - 5.28 10*6/mm3    Hemoglobin 13.8 12.0 - 15.9 g/dL    Hematocrit 40.5 34.0 - 46.6 %    MCV 93.3 79.0 - 97.0 fL    MCH 31.8 26.6 - 33.0 pg    MCHC 34.1 31.5 - 35.7 g/dL    RDW 13.8 12.3 - 15.4 %    RDW-SD 47.0 37.0 - 54.0 fl    MPV 12.6 (H) 6.0 - 12.0 fL    Platelets 188 140 - 450 10*3/mm3    Neutrophil % 63.2 42.7 - 76.0 %    Lymphocyte % 23.8 19.6 - 45.3 %    Monocyte % 11.5 5.0 - 12.0 %    Eosinophil % 0.7 0.3 - 6.2 %    Basophil % 0.6 0.0 - 1.5 %    Immature Grans % 0.2 0.0 - 0.5 %    Neutrophils, Absolute 5.34 1.40 - 7.00 10*3/mm3    Lymphocytes, Absolute 2.01 0.70 - 3.10 10*3/mm3    Monocytes, Absolute 0.97 (H) 0.10 - 0.90 10*3/mm3    Eosinophils, Absolute 0.06 0.00 - 0.40 10*3/mm3    Basophils, Absolute 0.05 0.00 - 0.20 10*3/mm3    Immature Grans, Absolute 0.02 0.00 - 0.05 10*3/mm3    nRBC 0.0 0.0 - 0.0 /100 WBC   Magnesium    Collection Time: 02/12/19  6:12 AM   Result Value Ref Range    Magnesium  1.7 1.6 - 2.4 mg/dL   Urinalysis With Microscopic If Indicated (No Culture) - Urine, Clean Catch    Collection Time: 02/12/19  9:32 AM   Result Value Ref Range    Color, UA Yellow Yellow, Straw    Appearance, UA Clear Clear    pH, UA 7.5 5.0 - 8.0    Specific Gravity, UA >=1.030 1.005 - 1.030    Glucose, UA Negative Negative    Ketones, UA Trace (A) Negative    Bilirubin, UA Negative Negative    Blood, UA Negative Negative    Protein, UA Negative Negative    Leuk Esterase, UA Negative Negative    Nitrite, UA Negative Negative    Urobilinogen, UA 1.0 E.U./dL 0.2 - 1.0 E.U./dL   Troponin    Collection Time: 02/12/19 10:17 AM   Result Value Ref Range    Troponin T <0.010 0.000 - 0.030 ng/mL       I ordered the above labs and reviewed the results    RADIOLOGY  CT Abdomen Pelvis With Contrast   Final Result   1. Mildly distended gallbladder. No gallstones or wall thickening is   seen.   2. No acute process identified to explain patient's lower abdominal   pain.       Radiation dose reduction techniques were utilized, including automated   exposure control and exposure modulation based on body size.       This report was finalized on 2/12/2019 8:49 AM by Dr. Joseph Ward M.D.              I ordered the above noted radiological studies and reviewed the images on the PACS system.    EKG  Reviewed by Dr. Laboy. New onset afib, rate controlled.      PROGRESS AND CONSULTS  0819  Reviewed pt's history and workup with Dr. Laboy. Care turned over to Dr. Laboy  After a bedside evaluation; Dr Laboy agrees with the plan of care    0908  Discussed the pt w/ ADEEL Contreras. He advised to administer 40 meq PO and he will admit.    1022  Discussed the pt w/ ANDREINA Salinas. He agreed to consult on the pt.    COURSE & MEDICAL DECISION MAKING  Pertinent Labs and Imaging studies that were ordered and reviewed are noted above.  Results were reviewed/discussed with the patient and they were also made aware of online assess.  "  Pt also made aware that some labs, such as cultures, will not be resulted during ER visit and follow up with PMD is necessary.     MEDICATIONS GIVEN IN ER  Medications   sodium chloride 0.9 % flush 10 mL (not administered)   sodium chloride 0.9 % bolus 1,000 mL (0 mL Intravenous Stopped 2/12/19 0834)   potassium chloride 10 mEq in 100 mL IVPB (0 mEq Intravenous Stopped 2/12/19 0948)   potassium chloride 10 mEq in 100 mL IVPB (0 mEq Intravenous Stopped 2/12/19 0834)   iopamidol (ISOVUE-300) 61 % injection 100 mL (85 mL Intravenous Given by Other 2/12/19 0720)   acetaminophen (TYLENOL) tablet 1,000 mg (1,000 mg Oral Given 2/12/19 1025)   potassium chloride (MICRO-K) CR capsule 40 mEq (40 mEq Oral Given 2/12/19 1014)       /63 (Patient Position: Sitting)   Pulse 96   Temp 98.7 °F (37.1 °C) (Tympanic)   Resp 18   Ht 170.2 cm (67\")   Wt 63.7 kg (140 lb 6.4 oz)   SpO2 95%   BMI 21.99 kg/m²     ADMISSION    Discussed treatment plan and reason for admission with pt/family and admitting physician.  Pt/family voiced understanding of the plan for admission for further testing/treatment as needed.      DIAGNOSIS  Final diagnoses:   Hypokalemia   Atrial fibrillation, unspecified type (CMS/HCC)     Documentation assistance provided by alesia Chen for JODY Sanabria.  Information recorded by the scrdeborah was done at my direction and has been verified and validated by me.     Laura Chen  02/12/19 1023       Wanda Mijares APRN  02/12/19 1258    "

## 2019-02-12 NOTE — ED NOTES
Nursing report ED to floor  Trang Liu  83 y.o.  female    HPI (triage note):   Chief Complaint   Patient presents with   • Abdominal Pain     Abdominal pain X3 days; From Home; Pt has n/v;    • Nausea       Admitting doctor:   Romel Scruggs MD    Admitting diagnosis:   The primary encounter diagnosis was Hypokalemia. A diagnosis of Atrial fibrillation, unspecified type (CMS/HCC) was also pertinent to this visit.    Code status:   Current Code Status     Date Active Code Status Order ID Comments User Context       Prior          Allergies:   Cimetidine; Codeine; Doxycycline; Guaifenesin & derivatives; Nitrofuran derivatives; Oxaprozin; Penicillins; and Sulfa antibiotics    Weight:       02/12/19  0600   Weight: 63.7 kg (140 lb 6.4 oz)       Most recent vitals:   Vitals:    02/12/19 0935 02/12/19 1004 02/12/19 1030 02/12/19 1104   BP: 177/78 145/100  142/63   BP Location: Left arm      Patient Position: Sitting Sitting  Sitting   Pulse: 86 79 90 96   Resp:       Temp:       TempSrc:       SpO2: 94% 93% 94% 95%   Weight:       Height:           Active LDAs/IV Access:   Lines, Drains & Airways    Active LDAs     Name:   Placement date:   Placement time:   Site:   Days:    Peripheral IV 02/12/19 0610 Right;Lower Forearm   02/12/19    0610    Forearm   less than 1                Labs (abnormal labs have a star):   Labs Reviewed   COMPREHENSIVE METABOLIC PANEL - Abnormal; Notable for the following components:       Result Value    Glucose 102 (*)     Potassium 2.3 (*)     Chloride 90 (*)     CO2 32.0 (*)     All other components within normal limits    Narrative:     The MDRD GFR formula is only valid for adults with stable renal function between ages 18 and 70.   URINALYSIS W/ MICROSCOPIC IF INDICATED (NO CULTURE) - Abnormal; Notable for the following components:    Ketones, UA Trace (*)     All other components within normal limits    Narrative:     Urine microscopic not indicated.   CBC WITH AUTO  DIFFERENTIAL - Abnormal; Notable for the following components:    MPV 12.6 (*)     Monocytes, Absolute 0.97 (*)     All other components within normal limits   LIPASE - Normal   MAGNESIUM - Normal   TROPONIN (IN-HOUSE) - Normal    Narrative:     Troponin T Reference Range:  <= 0.03 ng/mL-   Negative for AMI  >0.03 ng/mL-     Abnormal for myocardial necrosis.  Clinicians would have to utilize clinical acumen, EKG, Troponin and serial changes to determine if it is an Acute Myocardial Infarction or myocardial injury due to an underlying chronic condition.    RAINBOW DRAW    Narrative:     The following orders were created for panel order [facility] Draw.  Procedure                               Abnormality         Status                     ---------                               -----------         ------                     Light Blue Top[[id number]]                                   Final result               Green Top (Gel)[[id number]]                                  Final result               Lavender Top[[id number]]                                     Final result               Gold Top - SST[[id number]]                                   Final result                 Please view results for these tests on the individual orders.   CBC AND DIFFERENTIAL    Narrative:     The following orders were created for panel order CBC & Differential.  Procedure                               Abnormality         Status                     ---------                               -----------         ------                     CBC Auto Differential[[id number]]        Abnormal            Final result                 Please view results for these tests on the individual orders.   LIGHT BLUE TOP   GREEN TOP   LAVENDER TOP   GOLD TOP - SST       EKG:   ECG 12 Lead    (Results Pending)   ECG 12 Lead    (Results Pending)       Meds given in ED:   Medications   sodium chloride 0.9 % flush 10 mL (not administered)   sodium chloride 0.9 % bolus  1,000 mL (0 mL Intravenous Stopped 2/12/19 0834)   potassium chloride 10 mEq in 100 mL IVPB (0 mEq Intravenous Stopped 2/12/19 0948)   potassium chloride 10 mEq in 100 mL IVPB (0 mEq Intravenous Stopped 2/12/19 0834)   iopamidol (ISOVUE-300) 61 % injection 100 mL (85 mL Intravenous Given by Other 2/12/19 0720)   acetaminophen (TYLENOL) tablet 1,000 mg (1,000 mg Oral Given 2/12/19 1025)   potassium chloride (MICRO-K) CR capsule 40 mEq (40 mEq Oral Given 2/12/19 1014)       Imaging results:  Ct Abdomen Pelvis With Contrast    Result Date: 2/12/2019  1. Mildly distended gallbladder. No gallstones or wall thickening is seen. 2. No acute process identified to explain patient's lower abdominal pain.  Radiation dose reduction techniques were utilized, including automated exposure control and exposure modulation based on body size.  This report was finalized on 2/12/2019 8:49 AM by Dr. Joseph Ward M.D.        Ambulatory status:   At [facility]    Social issues:   Social History     Socioeconomic History   • Marital status:      Spouse name: Not on file   • Number of children: Not on file   • Years of education: Not on file   • Highest education level: Not on file   Social Needs   • Financial resource strain: Not on file   • Food insecurity - worry: Not on file   • Food insecurity - inability: Not on file   • Transportation needs - medical: Not on file   • Transportation needs - non-medical: Not on file   Occupational History   • Not on file   Tobacco Use   • Smoking status: Never Smoker   • Smokeless tobacco: Never Used   Substance and Sexual Activity   • Alcohol use: No   • Drug use: No   • Sexual activity: Defer   Other Topics Concern   • Not on file   Social History Narrative   • Not on file          Anali Hackett RN  02/12/19 9915

## 2019-02-12 NOTE — ED PROVIDER NOTES
Pt is a 83 y.o. female who presents to the ED complaining of lower abd pain that started several days ago. Pt also complains of N/V and muscle cramps. Pt states that she has not been taking her potassium or Lasix since her recent fall 6 days ago. Pt's daughter is at bedside states pt has been constipated with last BM 6 days ago.       On exam, mild right sided abd tender to palpitation. Pt is well appearing and in NAD.       Labs (potassium-2.3, lipase-20, WBC-8.45) and imaging (CT abd/pelvis-negative acute) reviewed.     2019-Informed pt and family of CT abd/pelvis-negative acute and potassium-2.3 Informed pt that after reviewing her prior lab that pt has chronic hypokalemia. Informed pt that her potassium today is the lowest that it has been. Informed pt and family of plan to admit pt for hypokalemia. The patient indicates understanding of these issues and agrees with the plan.      Plan: Admission for correction of hypokalemia.     EKG          EKG time: 0940   Rhythm/Rate: A Fib 86   P waves and CT: A Fib  QRS, axis: N, N   ST and T waves: Diffuse ST depression     Interpreted Contemporaneously by me, independently viewed  A Fib is new changed compared to prior 2014        MD ATTESTATION NOTE    The YESENIA and I have discussed this patient's history, physical exam, and treatment plan.  I have reviewed the documentation and personally had a face to face interaction with the patient. I affirm the documentation and agree with the treatment and plan.  The attached note describes my personal findings.      Documentation assistance provided by alesia Roque for Dr. Narda MD. Information recorded by the scribe was done at my direction and has been verified and validated by me.             Kendal Roque  02/12/19 9273       Satnam Laboy MD  02/12/19 0901       Satnam Laboy MD  02/12/19 1012

## 2019-02-12 NOTE — ED NOTES
Contacted MARTHA Trejo to obtain breakfast tray for pt. Pt informed of Tylenol order for pain. Pt states she does not want to take medication until she is able to eat food as it may upset her stomach. Pt also informed staff still needing urine sample from pt. Pt reports she will use bedside commode wand clean cath supplies to obtain urine sample at this time.      Wendy Mac, RN  02/12/19 0910

## 2019-02-12 NOTE — PROGRESS NOTES
Discharge Planning Assessment  [facility]     Patient Name: Trang Liu  MRN: [id number]  Today's Date: 2/12/2019    Admit Date: 2/12/2019    Discharge Needs Assessment     Row Name 02/12/19 1022       Living Environment    Lives With  alone    Unique Family Situation  -- Pt lives in her home and has a renter that lives with her but he does not provide any care needs.    Current Living Arrangements  home/apartment/condo house    Duration at Residence  -- 50 plus years    Potentially Unsafe Housing Conditions  -- none    Primary Care Provided by  self    Provides Primary Care For  no one    Caregiving Concerns  -- Pt had no caregiving needs at this time.    Family Caregiver if Needed  child(jonathan), adult    Family Caregiver Names  -- daughter Bekah Doherty can help and only lives 5 minutes down the road.    Quality of Family Relationships  helpful;involved;supportive    Able to Return to Prior Arrangements  yes    Living Arrangement Comments  -- pt plans on returning home at discharg       Resource/Environmental Concerns    Resource/Environmental Concerns  none    Transportation Concerns  -- none       Transition Planning    Patient/Family Anticipates Transition to  home    Patient/Family Anticipated Services at Transition  none    Transportation Anticipated  family or friend will provide daughter can provide home at discharge       Discharge Needs Assessment    Readmission Within the Last 30 Days  no previous admission in last 30 days    Concerns to be Addressed  no discharge needs identified    Concerns Comments  -- none    Equipment Currently Used at Home  walker, rolling;cane, quad    Anticipated Changes Related to Illness  none    Equipment Needed After Discharge  none    Outpatient/Agency/Support Group Needs  -- none    Discharge Facility/Level of Care Needs  -- none    Offered/Gave Vendor List  yes offered resources but pt and family denies the need for them at this time.    Patient's Choice of Community  Agency(s)  -- pt has used a HH in the past but can't remember the name    Current Discharge Risk  -- none    Discharge Coordination/Progress  -- Pt plans on returning home at discharge with no needs identified at this time.        Discharge Plan     Row Name 02/12/19 1019       Plan    Plan  -- Into room introduced self and role of CCP and verified info on face sheet.    Patient/Family in Agreement with Plan  yes Daugher in room during assessment and is also agreeable to discharge plan.    Plan Comments  -- Pt plans on returing home with no needs  identified at this time.        Destination      No service coordination in this encounter.      Durable Medical Equipment      No service coordination in this encounter.      Dialysis/Infusion      No service coordination in this encounter.      Home Medical Care      No service coordination in this encounter.      Community Resources      No service coordination in this encounter.          Demographic Summary    No documentation.       Functional Status    No documentation.       Psychosocial    No documentation.       Abuse/Neglect    No documentation.       Legal    No documentation.       Substance Abuse    No documentation.       Patient Forms    No documentation.           Vernell Mcdermott RN

## 2019-02-12 NOTE — ED NOTES
Pt moved to ED room 38 from ED 25 per Mimi FREEMAN RN on stretcher with all personal belongings to await IP bed assignment.      Wendy Mac RN  02/12/19 112

## 2019-02-12 NOTE — ED NOTES
Pt here for c/o abdominal pain - states pain all over, but worse in RLQ.  Pt has not had a bowel movement in 6 days.  Pt states nausea, vomited phlegm. Denies cp, soa or dysuria. Bed in low position, call light within reach, side rails up X2. Pt is alert and oriented X4, PERRLA, respirations are even and unlabored, chest rise and fall is equal in expansion.  Pt does not appear to be in distress at this time     Faye Stinson, RN  02/12/19 0606

## 2019-02-13 ENCOUNTER — APPOINTMENT (OUTPATIENT)
Dept: CARDIOLOGY | Facility: HOSPITAL | Age: 84
End: 2019-02-13

## 2019-02-13 LAB
ANION GAP SERPL CALCULATED.3IONS-SCNC: 12.8 MMOL/L
AORTIC DIMENSIONLESS INDEX: 0.5 (DI)
BH CV ECHO MEAS - AO MAX PG (FULL): 16.8 MMHG
BH CV ECHO MEAS - AO MAX PG: 21.2 MMHG
BH CV ECHO MEAS - AO MEAN PG (FULL): 7 MMHG
BH CV ECHO MEAS - AO MEAN PG: 9 MMHG
BH CV ECHO MEAS - AO ROOT AREA (BSA CORRECTED): 1.6
BH CV ECHO MEAS - AO ROOT AREA: 5.7 CM^2
BH CV ECHO MEAS - AO ROOT DIAM: 2.7 CM
BH CV ECHO MEAS - AO V2 MAX: 230 CM/SEC
BH CV ECHO MEAS - AO V2 MEAN: 140 CM/SEC
BH CV ECHO MEAS - AO V2 VTI: 50.7 CM
BH CV ECHO MEAS - AVA(I,A): 1 CM^2
BH CV ECHO MEAS - AVA(I,D): 1 CM^2
BH CV ECHO MEAS - AVA(V,A): 1 CM^2
BH CV ECHO MEAS - AVA(V,D): 1 CM^2
BH CV ECHO MEAS - BSA(HAYCOCK): 1.7 M^2
BH CV ECHO MEAS - BSA: 1.7 M^2
BH CV ECHO MEAS - BZI_BMI: 21.9 KILOGRAMS/M^2
BH CV ECHO MEAS - BZI_METRIC_HEIGHT: 170.2 CM
BH CV ECHO MEAS - BZI_METRIC_WEIGHT: 63.5 KG
BH CV ECHO MEAS - EDV(CUBED): 35.9 ML
BH CV ECHO MEAS - EDV(MOD-SP2): 72 ML
BH CV ECHO MEAS - EDV(MOD-SP4): 53 ML
BH CV ECHO MEAS - EDV(TEICH): 44.1 ML
BH CV ECHO MEAS - EF(CUBED): 61.5 %
BH CV ECHO MEAS - EF(MOD-BP): 63 %
BH CV ECHO MEAS - EF(MOD-SP2): 59.7 %
BH CV ECHO MEAS - EF(MOD-SP4): 66 %
BH CV ECHO MEAS - EF(TEICH): 54.3 %
BH CV ECHO MEAS - ESV(CUBED): 13.8 ML
BH CV ECHO MEAS - ESV(MOD-SP2): 29 ML
BH CV ECHO MEAS - ESV(MOD-SP4): 18 ML
BH CV ECHO MEAS - ESV(TEICH): 20.2 ML
BH CV ECHO MEAS - FS: 27.3 %
BH CV ECHO MEAS - IVS/LVPW: 1
BH CV ECHO MEAS - IVSD: 0.6 CM
BH CV ECHO MEAS - LAT PEAK E' VEL: 7 CM/SEC
BH CV ECHO MEAS - LV DIASTOLIC VOL/BSA (35-75): 30.5 ML/M^2
BH CV ECHO MEAS - LV MASS(C)D: 46.5 GRAMS
BH CV ECHO MEAS - LV MASS(C)DI: 26.8 GRAMS/M^2
BH CV ECHO MEAS - LV MAX PG: 4.3 MMHG
BH CV ECHO MEAS - LV MEAN PG: 2 MMHG
BH CV ECHO MEAS - LV SYSTOLIC VOL/BSA (12-30): 10.4 ML/M^2
BH CV ECHO MEAS - LV V1 MAX: 104 CM/SEC
BH CV ECHO MEAS - LV V1 MEAN: 69.4 CM/SEC
BH CV ECHO MEAS - LV V1 VTI: 23.4 CM
BH CV ECHO MEAS - LVIDD: 3.3 CM
BH CV ECHO MEAS - LVIDS: 2.4 CM
BH CV ECHO MEAS - LVLD AP2: 8.4 CM
BH CV ECHO MEAS - LVLD AP4: 7.7 CM
BH CV ECHO MEAS - LVLS AP2: 7.1 CM
BH CV ECHO MEAS - LVLS AP4: 6.4 CM
BH CV ECHO MEAS - LVOT AREA (M): 2.3 CM^2
BH CV ECHO MEAS - LVOT AREA: 2.3 CM^2
BH CV ECHO MEAS - LVOT DIAM: 1.7 CM
BH CV ECHO MEAS - LVPWD: 0.6 CM
BH CV ECHO MEAS - MED PEAK E' VEL: 6 CM/SEC
BH CV ECHO MEAS - MV A DUR: 0.18 SEC
BH CV ECHO MEAS - MV A MAX VEL: 92.2 CM/SEC
BH CV ECHO MEAS - MV DEC SLOPE: 385 CM/SEC^2
BH CV ECHO MEAS - MV DEC TIME: 0.29 SEC
BH CV ECHO MEAS - MV E MAX VEL: 101 CM/SEC
BH CV ECHO MEAS - MV E/A: 1.1
BH CV ECHO MEAS - MV MAX PG: 4.5 MMHG
BH CV ECHO MEAS - MV MEAN PG: 2 MMHG
BH CV ECHO MEAS - MV P1/2T MAX VEL: 105 CM/SEC
BH CV ECHO MEAS - MV P1/2T: 79.9 MSEC
BH CV ECHO MEAS - MV V2 MAX: 106 CM/SEC
BH CV ECHO MEAS - MV V2 MEAN: 66 CM/SEC
BH CV ECHO MEAS - MV V2 VTI: 33.3 CM
BH CV ECHO MEAS - MVA P1/2T LCG: 2.1 CM^2
BH CV ECHO MEAS - MVA(P1/2T): 2.8 CM^2
BH CV ECHO MEAS - MVA(VTI): 1.6 CM^2
BH CV ECHO MEAS - PA ACC TIME: 0.12 SEC
BH CV ECHO MEAS - PA MAX PG (FULL): 2.3 MMHG
BH CV ECHO MEAS - PA MAX PG: 4.7 MMHG
BH CV ECHO MEAS - PA PR(ACCEL): 25 MMHG
BH CV ECHO MEAS - PA V2 MAX: 108 CM/SEC
BH CV ECHO MEAS - PULM A REVS DUR: 0.2 SEC
BH CV ECHO MEAS - PULM A REVS VEL: 36.4 CM/SEC
BH CV ECHO MEAS - PULM DIAS VEL: 77.6 CM/SEC
BH CV ECHO MEAS - PULM S/D: 1.4
BH CV ECHO MEAS - PULM SYS VEL: 107 CM/SEC
BH CV ECHO MEAS - PVA(V,A): 1.8 CM^2
BH CV ECHO MEAS - PVA(V,D): 1.8 CM^2
BH CV ECHO MEAS - QP/QS: 0.71
BH CV ECHO MEAS - RAP SYSTOLE: 3 MMHG
BH CV ECHO MEAS - RV MAX PG: 2.4 MMHG
BH CV ECHO MEAS - RV MEAN PG: 1 MMHG
BH CV ECHO MEAS - RV V1 MAX: 76.9 CM/SEC
BH CV ECHO MEAS - RV V1 MEAN: 45.8 CM/SEC
BH CV ECHO MEAS - RV V1 VTI: 14.9 CM
BH CV ECHO MEAS - RVOT AREA: 2.5 CM^2
BH CV ECHO MEAS - RVOT DIAM: 1.8 CM
BH CV ECHO MEAS - RVSP: 30.2 MMHG
BH CV ECHO MEAS - SI(AO): 167.1 ML/M^2
BH CV ECHO MEAS - SI(CUBED): 12.7 ML/M^2
BH CV ECHO MEAS - SI(LVOT): 30.6 ML/M^2
BH CV ECHO MEAS - SI(MOD-SP2): 24.7 ML/M^2
BH CV ECHO MEAS - SI(MOD-SP4): 20.1 ML/M^2
BH CV ECHO MEAS - SI(TEICH): 13.8 ML/M^2
BH CV ECHO MEAS - SV(AO): 290.3 ML
BH CV ECHO MEAS - SV(CUBED): 22.1 ML
BH CV ECHO MEAS - SV(LVOT): 53.1 ML
BH CV ECHO MEAS - SV(MOD-SP2): 43 ML
BH CV ECHO MEAS - SV(MOD-SP4): 35 ML
BH CV ECHO MEAS - SV(RVOT): 37.9 ML
BH CV ECHO MEAS - SV(TEICH): 24 ML
BH CV ECHO MEAS - TAPSE (>1.6): 1.8 CM2
BH CV ECHO MEAS - TR MAX VEL: 261 CM/SEC
BH CV ECHO MEASUREMENTS AVERAGE E/E' RATIO: 15.54
BH CV VAS BP RIGHT ARM: NORMAL MMHG
BH CV XLRA - RV BASE: 3.1 CM
BH CV XLRA - TDI S': 15 CM/SEC
BUN BLD-MCNC: 7 MG/DL (ref 8–23)
BUN/CREAT SERPL: 9.9 (ref 7–25)
CALCIUM SPEC-SCNC: 9.5 MG/DL (ref 8.6–10.5)
CHLORIDE SERPL-SCNC: 98 MMOL/L (ref 98–107)
CO2 SERPL-SCNC: 32.2 MMOL/L (ref 22–29)
CREAT BLD-MCNC: 0.71 MG/DL (ref 0.57–1)
GFR SERPL CREATININE-BSD FRML MDRD: 79 ML/MIN/1.73
GLUCOSE BLD-MCNC: 93 MG/DL (ref 65–99)
LEFT ATRIUM VOLUME INDEX: 32 ML/M2
LEFT ATRIUM VOLUME: 51 CM3
MAXIMAL PREDICTED HEART RATE: 138 BPM
POTASSIUM BLD-SCNC: 2.8 MMOL/L (ref 3.5–5.2)
SODIUM BLD-SCNC: 143 MMOL/L (ref 136–145)
STRESS TARGET HR: 117 BPM

## 2019-02-13 PROCEDURE — G0378 HOSPITAL OBSERVATION PER HR: HCPCS

## 2019-02-13 PROCEDURE — 93306 TTE W/DOPPLER COMPLETE: CPT | Performed by: INTERNAL MEDICINE

## 2019-02-13 PROCEDURE — 93306 TTE W/DOPPLER COMPLETE: CPT

## 2019-02-13 PROCEDURE — 90791 PSYCH DIAGNOSTIC EVALUATION: CPT | Performed by: MARRIAGE & FAMILY THERAPIST

## 2019-02-13 PROCEDURE — 97110 THERAPEUTIC EXERCISES: CPT

## 2019-02-13 PROCEDURE — 94799 UNLISTED PULMONARY SVC/PX: CPT

## 2019-02-13 PROCEDURE — 99213 OFFICE O/P EST LOW 20 MIN: CPT | Performed by: INTERNAL MEDICINE

## 2019-02-13 PROCEDURE — 97162 PT EVAL MOD COMPLEX 30 MIN: CPT

## 2019-02-13 PROCEDURE — 80048 BASIC METABOLIC PNL TOTAL CA: CPT | Performed by: HOSPITALIST

## 2019-02-13 RX ADMIN — CARBIDOPA AND LEVODOPA 2 TABLET: 25; 100 TABLET ORAL at 09:19

## 2019-02-13 RX ADMIN — AMLODIPINE BESYLATE 10 MG: 10 TABLET ORAL at 09:18

## 2019-02-13 RX ADMIN — SODIUM CHLORIDE, PRESERVATIVE FREE 3 ML: 5 INJECTION INTRAVENOUS at 23:28

## 2019-02-13 RX ADMIN — SUCRALFATE 1 G: 1 SUSPENSION ORAL at 09:17

## 2019-02-13 RX ADMIN — CARBIDOPA AND LEVODOPA 2 TABLET: 25; 100 TABLET ORAL at 18:39

## 2019-02-13 RX ADMIN — BUDESONIDE AND FORMOTEROL FUMARATE DIHYDRATE 2 PUFF: 160; 4.5 AEROSOL RESPIRATORY (INHALATION) at 09:32

## 2019-02-13 RX ADMIN — BUDESONIDE AND FORMOTEROL FUMARATE DIHYDRATE 2 PUFF: 160; 4.5 AEROSOL RESPIRATORY (INHALATION) at 21:19

## 2019-02-13 RX ADMIN — Medication 1000 MCG: at 09:19

## 2019-02-13 RX ADMIN — FLUTICASONE PROPIONATE 2 SPRAY: 50 SPRAY, METERED NASAL at 09:17

## 2019-02-13 RX ADMIN — FUROSEMIDE 40 MG: 40 TABLET ORAL at 09:19

## 2019-02-13 RX ADMIN — POTASSIUM CHLORIDE 40 MEQ: 750 CAPSULE, EXTENDED RELEASE ORAL at 20:38

## 2019-02-13 RX ADMIN — POTASSIUM CHLORIDE 40 MEQ: 750 CAPSULE, EXTENDED RELEASE ORAL at 09:17

## 2019-02-13 RX ADMIN — SODIUM CHLORIDE, PRESERVATIVE FREE 3 ML: 5 INJECTION INTRAVENOUS at 09:16

## 2019-02-13 RX ADMIN — HYDROCODONE BITARTRATE AND ACETAMINOPHEN 1 TABLET: 5; 325 TABLET ORAL at 23:28

## 2019-02-13 RX ADMIN — CARBIDOPA AND LEVODOPA 2 TABLET: 25; 100 TABLET ORAL at 12:34

## 2019-02-13 RX ADMIN — PANTOPRAZOLE SODIUM 40 MG: 40 TABLET, DELAYED RELEASE ORAL at 09:19

## 2019-02-13 RX ADMIN — CARBIDOPA AND LEVODOPA 2 TABLET: 25; 100 TABLET ORAL at 23:29

## 2019-02-13 RX ADMIN — PAROXETINE HYDROCHLORIDE 20 MG: 20 TABLET, FILM COATED ORAL at 09:19

## 2019-02-13 RX ADMIN — ACETAMINOPHEN 650 MG: 325 TABLET, FILM COATED ORAL at 10:15

## 2019-02-13 RX ADMIN — VITAMIN D, TAB 1000IU (100/BT) 1000 UNITS: 25 TAB at 09:19

## 2019-02-13 RX ADMIN — HYDROCODONE BITARTRATE AND ACETAMINOPHEN 1 TABLET: 5; 325 TABLET ORAL at 15:22

## 2019-02-13 NOTE — PLAN OF CARE
Problem: Patient Care Overview  Goal: Plan of Care Review  Outcome: Ongoing (interventions implemented as appropriate)   02/12/19 2011   Coping/Psychosocial   Plan of Care Reviewed With patient   Plan of Care Review   Progress no change   OTHER   Outcome Summary Pt non-compliant with medications. Refuses meds or has an excuse for not taking meds. Medicated for back pain and nausea. No emesis seen. Telemetry a-fib with occ NSR.       Problem: Fall Risk (Adult)  Goal: Identify Related Risk Factors and Signs and Symptoms  Outcome: Ongoing (interventions implemented as appropriate)   02/12/19 2011   Fall Risk (Adult)   Related Risk Factors (Fall Risk) age-related changes;gait/mobility problems;history of falls;environment unfamiliar   Signs and Symptoms (Fall Risk) presence of risk factors     Goal: Absence of Fall  Outcome: Ongoing (interventions implemented as appropriate)      Problem: Skin Injury Risk (Adult)  Goal: Identify Related Risk Factors and Signs and Symptoms  Outcome: Ongoing (interventions implemented as appropriate)   02/12/19 2011   Skin Injury Risk (Adult)   Related Risk Factors (Skin Injury Risk) mobility impaired     Goal: Skin Health and Integrity  Outcome: Ongoing (interventions implemented as appropriate)      Problem: Arrhythmia/Dysrhythmia (Symptomatic) (Adult)  Goal: Signs and Symptoms of Listed Potential Problems Will be Absent, Minimized or Managed (Arrhythmia/Dysrhythmia)  Outcome: Ongoing (interventions implemented as appropriate)   02/12/19 2011   Goal/Outcome Evaluation   Problems Assessed (Arrhythmia/Dysrhythmia) situational response   Problems Present (Dysrhythmia) situational response

## 2019-02-13 NOTE — PROGRESS NOTES
Hospital Follow Up    LOS:  LOS: 0 days   Patient Name: Trang Liu  Age/Sex: 83 y.o. female  : 1935  MRN: [id number]    Day of Service: 19   Length of Stay: 0  Encounter Provider: Ranjan Sarmiento MD  Place of Service: [facility] CARDIOLOGY  Patient Care Team:  Nayla Higginbotham APRN as PCP - General (Family Medicine)    Subjective:     Chief Complaint: Atrial fibrillation.    Interval History: Patient looks pretty good this morning.  On monitor appeared to be PACs this morning.    Objective:     Objective:  Temp:  [98 °F (36.7 °C)-98.8 °F (37.1 °C)] 98.8 °F (37.1 °C)  Heart Rate:  [80-96] 88  Resp:  [14-18] 14  BP: (109-142)/(63-72) 140/72     Intake/Output Summary (Last 24 hours) at 2019 1040  Last data filed at 2019 1000  Gross per 24 hour   Intake 120 ml   Output --   Net 120 ml     Body mass index is 21.93 kg/m².      19  0600 19  1432   Weight: 63.7 kg (140 lb 6.4 oz) 63.5 kg (140 lb)     Weight change: -0.181 kg (-6.4 oz)      Physical Exam:   General : Alert, cooperative, in no acute distress.  Neuro: alert,cooperative and oriented  Lungs: CTAB. Normal respiratory effort and rate.  CV:: Regular rate and rhythm, normal S1 and S2, no murmurs, gallops or rubs.  ABD: Soft, nontender, non-distended. positive bowel sounds  Extr: No edema or cyanosis, moves all extremities    Lab Review:   Results from last 7 days   Lab Units 19  0546 19  1525 19  0612   SODIUM mmol/L 143 139 138   POTASSIUM mmol/L 2.8* 2.7* 2.3*   CHLORIDE mmol/L 98 96* 90*   CO2 mmol/L 32.2* 30.4* 32.0*   BUN mg/dL 7* 11 14   CREATININE mg/dL 0.71 0.64 0.68   GLUCOSE mg/dL 93 108* 102*   CALCIUM mg/dL 9.5 9.1 10.0   AST (SGOT) U/L  --   --  9   ALT (SGPT) U/L  --   --  6     Results from last 7 days   Lab Units 19  1525 19  1017   TROPONIN T ng/mL <0.010 <0.010     Results from last 7 days   Lab Units 19  0612   WBC 10*3/mm3  8.45   HEMOGLOBIN g/dL 13.8   HEMATOCRIT % 40.5   PLATELETS 10*3/mm3 188         Results from last 7 days   Lab Units 02/12/19  0612   MAGNESIUM mg/dL 1.7           Invalid input(s): LDLCALC      Results from last 7 days   Lab Units 02/12/19  1017   TSH mIU/mL 1.820     I reviewed the patient's new clinical results.  I personally viewed and interpreted the patient's EKG  Current Medications:   Scheduled Meds:  amLODIPine 10 mg Oral Daily   budesonide-formoterol 2 puff Inhalation BID - RT   carbidopa-levodopa 2 tablet Oral 4x Daily   cholecalciferol 1,000 Units Oral Daily   fluticasone 2 spray Nasal Daily   furosemide 40 mg Oral Daily   pantoprazole 40 mg Oral Daily   PARoxetine 20 mg Oral QAM   potassium chloride 40 mEq Oral Daily   sodium chloride 3 mL Intravenous Q12H   sucralfate 1 g Oral 4x Daily   vitamin B-12 1,000 mcg Oral Daily     Continuous Infusions:     Allergies:  Allergies   Allergen Reactions   • Cimetidine Unknown (See Comments)     unknown   • Codeine Itching   • Doxycycline Hives   • Guaifenesin & Derivatives Unknown (See Comments)     Unknown     • Nitrofuran Derivatives Itching   • Oxaprozin Itching   • Penicillins Itching     unknown   • Sulfa Antibiotics Rash     And itching       Assessment:       Hypokalemia    Atrial fibrillation (CMS/HCC)    Abdominal pain    Anxiety        Plan:     1.  Paroxysmal atrial fibrillation.  Appears to be in sinus with PACs this morning.  2.  Abdominal pain workup in progress.  3.  Chronic dyspnea  4.  Hypokalemia.  Obviously can contribute to the atrial arrhythmias.    5. We will continue to follow for now and nothing to add      Ranjan Sarmiento MD  02/13/19  10:40 AM

## 2019-02-13 NOTE — PLAN OF CARE
Problem: Patient Care Overview  Goal: Plan of Care Review   02/13/19 1523   OTHER   Outcome Summary Patient is a 83 y.o. female admitted to [facility] for hypokalemia, afib on 2/12/2019. PMHx includes Parkinson's disease, RA. Patient is independent with rwx or cane at baseline and lives alone. Patient reports she rents a room in her house to someone. Today, patient performed bed mobility with CGA, required CGA for transfers, and ambulated 20 feet with rwx and CGA. Patient demonstrates impairments consisting of back pain limiting mobility efforts, decreased activity tolerance due to c/o back pain, decreased balance, generalized weakness. Patient may benefit from skilled PT services acutely to address functional deficits as well as improve level of independence prior to discharge. Anticipate SNU upon DC. Patient unable to clearly state timeline of events, states she fell and could not get up and that is why she is hospitalized now, also states she had back pain prior to this. Spoke with nsg that patient is clearly c/o back pain limiting ability to sit or stand erect, unsure of history of fall.             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     Patient has a medically complex history here after noted to have low systolic blood pressures at home. Here, patient is awake, alert, and oriented and complains of mild abdominal pain that has been present previously. Here, vitals notable for hypotension and tachycardia in the setting of Afib. Labs with significant anemia with Hgb 6.5, leukocytosis to 28, and CMP notable for mostly hypoalbuminemia to 1.5 with normal renal function. Hypocalcemia corrects to normal when adjusting for albumin. 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 Patient received 2U PRBCs and 1L fluids with improvement in BP and mental status. No further active bleeding here. Course complicated by Afib with RVR and mild associated hypotension in the setting of oral Metoprolol being held. Improved with IV Metoprolol during RRT and stable SBP. Repeat labs show significant improvement to Hgb 10-11 on repeat CBCs, normalization of lactate. HR better controlled now. Started on Vanc/Zosyn.     - Continue broad spectrum antibiotics at this time. Hypotension may be combination of sepsis secondary to possible PNA vs GI infection with known new fistula  - R/o C. diff given reports of diarrhea at home from son and recent hospitalization  - CBC improved significantly, hold further transfusions and no active bleeding. Re-check this afternoon  - GI consult and Surgery consult given CT findings, though unlikely to be a candidate for any surgical intervention  - IV Metoprolol as needed given difficult with PO Medications at this time  - Low threshold for MICU consult. Unable to obtain consult prior at time of admission, now improved stability and monitor on floor   -Full Code, discussed plan of care with Son     Remainder of plan as above.

## 2019-02-13 NOTE — CONSULTS
Adult Nutrition  Assessment/PES    Patient Name:  Trang Liu  YOB: 1935  MRN: [id number]  Admit Date:  2/12/2019    Assessment Date:  2/13/2019    Comments:  Consult d/t MST score of 2 per nurse admission screen.  Admitted with hypokalemia.  Abdominal pain, constipation x 6 days.  63% x 2 meals per chart PO data.    Patient with other discipline at time of visit.    Will attempt follow up next date.    Reason for Assessment     Row Name 02/13/19 1523          Reason for Assessment    Reason For Assessment  identified at risk by screening criteria;nurse/nurse practitioner consult     Diagnosis  psychosocial;cardiac disease;gastrointestinal disease;neurologic conditions anxiety, ataxia, AV insufficiency, GERD, hernia, HTN, MR, parkinson's, RA, spastic colon, TV regurgitation; adm with hypokalemia     Identified At Risk by Screening Criteria  difficulty chewing/swallowing;unintentional loss of 10 lbs or more in the past 2 mos;MST SCORE 2+         Nutrition/Diet History     Row Name 02/13/19 1524          Nutrition/Diet History    Typical Food/Fluid Intake  decreased appetite, nausea per chart notes         Anthropometrics     Row Name 02/13/19 1524          Admit Weight    Admit Weight  -- 140# 2/12        Usual Body Weight (UBW)    Weight Loss Time Frame  149# 1 month ago per chart weight report        Body Mass Index (BMI)    BMI Assessment  BMI 18.5-24.9: normal         Labs/Tests/Procedures/Meds     Row Name 02/13/19 1524          Labs/Procedures/Meds    Lab Results Reviewed  reviewed, pertinent     Lab Results Comments  K+, BUN,         Diagnostic Tests/Procedures    Diagnostic Test/Procedure Reviewed  reviewed, pertinent        Medications    Pertinent Medications Reviewed  reviewed, pertinent     Pertinent Medications Comments  vit D3, lasix, protonix, KCl, carafate, vit B12         Physical Findings     Row Name 02/13/19 1525          Physical Findings    Gastrointestinal  constipation  per MD note, no BM x 6 days before admission     Skin  -- B=19, bruised           Nutrition Prescription Ordered     Row Name 02/13/19 1525          Nutrition Prescription PO    Current PO Diet  Regular     Common Modifiers  Cardiac         Evaluation of Received Nutrient/Fluid Intake     Row Name 02/13/19 1526          PO Evaluation    Number of Meals  2     % PO Intake  63               Problem/Interventions:  Problem 1     Row Name 02/13/19 1526          Nutrition Diagnoses Problem 1    Problem 1  Inadequate Intake/Infusion     Inadequate Intake Type  Oral     Macronutrient  Kcal;Protein     Etiology (related to)  Factors Affecting Nutrition     Appetite  -- decreased per nurse admission screen     Signs/Symptoms (evidenced by)  PO Intake;Report/Observation     Percent (%) intake recorded  63 %     Over number of meals  2                 Intervention Goal     Row Name 02/13/19 1526          Intervention Goal    General  Maintain nutrition;Reduce/improve symptoms;Disease management/therapy     PO  Increase intake;PO intake (%)     PO Intake %  75 %     Weight  Maintain weight         Nutrition Intervention     Row Name 02/13/19 1526          Nutrition Intervention    RD/Tech Action  Follow Tx progress;Care plan reviewd           Education/Evaluation     Row Name 02/13/19 1526          Education    Education  Will Instruct as appropriate        Monitor/Evaluation    Monitor  Per protocol;PO intake;Pertinent labs;Weight;Symptoms     Education Follow-up  Reinforce PRN           Electronically signed by:  Aniyah Cosby RD  02/13/19 3:27 PM

## 2019-02-13 NOTE — PLAN OF CARE
Problem: Patient Care Overview  Goal: Plan of Care Review  Outcome: Ongoing (interventions implemented as appropriate)   02/13/19 5197   Coping/Psychosocial   Plan of Care Reviewed With patient   Plan of Care Review   Progress no change   OTHER   Outcome Summary Patient complained of pain. Patient was medicated twice. Patient is alert. Patient on room air. Vital signs are stable. In and out cath performed once. Access seen patient today. Will continue to monitor.       Problem: Fall Risk (Adult)  Goal: Absence of Fall  Outcome: Ongoing (interventions implemented as appropriate)      Problem: Skin Injury Risk (Adult)  Goal: Skin Health and Integrity  Outcome: Ongoing (interventions implemented as appropriate)      Problem: Arrhythmia/Dysrhythmia (Symptomatic) (Adult)  Goal: Signs and Symptoms of Listed Potential Problems Will be Absent, Minimized or Managed (Arrhythmia/Dysrhythmia)  Outcome: Ongoing (interventions implemented as appropriate)

## 2019-02-13 NOTE — PROGRESS NOTES
"DAILY PROGRESS NOTE  [facility]    Patient Identification:  Name: Trang Liu  Age: 83 y.o.  Sex: female  :  1935  MRN: [id number]         Primary Care Physician: Nayla Higginbotham, JODY      Subjective  \"I'm about the same\" but won't give any specifics.   Reported that pt refused her KCl last night.      Objective:  General Appearance:  Comfortable, well-appearing, in no acute distress and not in pain.    Vital signs: (most recent): Blood pressure 140/72, pulse 88, temperature 98.8 °F (37.1 °C), temperature source Oral, resp. rate 14, height 170.2 cm (67\"), weight 63.5 kg (140 lb), SpO2 96 %, not currently breastfeeding.    Lungs:  Normal effort and normal respiratory rate.  Breath sounds clear to auscultation.    Heart: Normal rate.  (Frequent ectopy. )  Abdomen: Abdomen is soft and non-distended.  Bowel sounds are normal.   There is no abdominal tenderness.     Extremities: There is no dependent edema.    Neurological: Patient is alert.  (Oriented to person and place. ).    Skin:  Warm and dry.                Vital signs in last 24 hours:  Temp:  [98 °F (36.7 °C)-98.8 °F (37.1 °C)] 98.8 °F (37.1 °C)  Heart Rate:  [79-96] 88  Resp:  [14-18] 14  BP: (109-145)/() 140/72    Intake/Output:  No intake or output data in the 24 hours ending 19 0941      Results from last 7 days   Lab Units 19  0612   WBC 10*3/mm3 8.45   HEMOGLOBIN g/dL 13.8   PLATELETS 10*3/mm3 188     Results from last 7 days   Lab Units 19  0546 19  1525 19  0612   SODIUM mmol/L 143 139 138   POTASSIUM mmol/L 2.8* 2.7* 2.3*   CHLORIDE mmol/L 98 96* 90*   CO2 mmol/L 32.2* 30.4* 32.0*   BUN mg/dL 7* 11 14   CREATININE mg/dL 0.71 0.64 0.68   GLUCOSE mg/dL 93 108* 102*   Estimated Creatinine Clearance: 54.3 mL/min (by C-G formula based on SCr of 0.71 mg/dL).  Results from last 7 days   Lab Units 19  0546 19  1525 19  0612   CALCIUM mg/dL 9.5 9.1 10.0   ALBUMIN g/dL  -- "   --  4.10   MAGNESIUM mg/dL  --   --  1.7     Results from last 7 days   Lab Units 02/12/19  0612   ALBUMIN g/dL 4.10   BILIRUBIN mg/dL 0.8   ALK PHOS U/L 58   AST (SGOT) U/L 9   ALT (SGPT) U/L 6       Assessment:   Hypokalemia:  Probably secondary to Lasix.  Pt not cooperating w KCl protocol.     Atrial fibrillation? - Per ER.  EKG and available rhythm strips show Sinus w PACs.  Requesting more rhythm strips.     Abdominal pain:  None at present with benign workup.    Anxiety:    Noncompliance:  Access eval.     Chronic Narcotic use:    Chronic Benzodiazepine use:          Plan:  Please see above.  Increase activity.  D/C planning.     Romel Scruggs MD  2/13/2019  9:41 AM

## 2019-02-13 NOTE — PLAN OF CARE
Problem: Patient Care Overview  Goal: Plan of Care Review  Outcome: Ongoing (interventions implemented as appropriate)   02/13/19 0550   Coping/Psychosocial   Plan of Care Reviewed With patient   Plan of Care Review   Progress improving   OTHER   Outcome Summary Has slept long intervals. Cont to refuse to take any meds including potassium. Explained the importance lesly with a very low k+ level pt still refuses. VSS. No distress       Problem: Fall Risk (Adult)  Goal: Absence of Fall  Outcome: Ongoing (interventions implemented as appropriate)      Problem: Skin Injury Risk (Adult)  Goal: Skin Health and Integrity  Outcome: Ongoing (interventions implemented as appropriate)      Problem: Arrhythmia/Dysrhythmia (Symptomatic) (Adult)  Goal: Signs and Symptoms of Listed Potential Problems Will be Absent, Minimized or Managed (Arrhythmia/Dysrhythmia)  Outcome: Ongoing (interventions implemented as appropriate)

## 2019-02-13 NOTE — CONSULTS
Access Ctr Consult. All information below is per Pt report, unless otherwise noted.     Pt interviewed with addi Copeland and son-in-law Patrick present, per Pt permission.    Pt is an 84y/o W/F. She was  in , after having been with her  for 60 yrs. She lives in the house where they lived since . Pt and late  fostered children for 22 yrs. She did not work outside the home once .    Pt had reportedly been off all her medications for almost one week prior to current hospital admission. Pt had a fall last week and it is her daughter's belief that Pt got mixed up on her sense of time, leading in part to not taking her medications. Pt said that she continued taking medications for her colon and her Parkinson's during the past week. Pt also said that she couldn't find her Paxil and other medications as reason for not taking them. Pt also hadn't told anyone she couldn't find some medications. Once family members found out, they looked for and located Pt's Paxil. As a result of this, Pt's step-daughter Bekah will be taking over managing Pt's medications at home.     Pt was tearful as she spoke of losses. Friends she has known have  and she has lost some independence by no longer driving. Most recently, Pt's grandaughter and her two young children moved out of Pt's house. That has been a tremendous loss to Pt.     Pt has much support to get out of the house and do things, yet has not had the energy, motivation or willingness to do as much over the past few months. This was reported by daughter and undisputed by Pt. Pt's son-in-law also noted an increase in Pt's physical fragility over the past 8 months.     Pt has no substance use hx. She has never seen a counselor. Her PCP prescribes her Paxil. On a 0-10 scale where 10 is worst, Pt rated depression a 3. Pt did not give a numeric rating for anxiety level, stating it is really situational. Pt's affect indicated she is more depressed currently.      No SI/HI. No wish for death. Pt has decreased appetite and intermittent difficulty getting to sleep.     Pt open to outpatient therapist referrals; referrals given to Pt.     Access to follow briefly.

## 2019-02-13 NOTE — THERAPY EVALUATION
Acute Care - Physical Therapy Initial Evaluation  [facility]     Patient Name: Trang Liu  : 1935  MRN: [id number]  Today's Date: 2019   Onset of Illness/Injury or Date of Surgery: 19            Admit Date: 2019    Visit Dx:     ICD-10-CM ICD-9-CM   1. Hypokalemia E87.6 276.8   2. Atrial fibrillation, unspecified type (CMS/HCC) I48.91 427.31   3. Decreased mobility R26.89 781.99     Patient Active Problem List   Diagnosis   • Upper GI bleed   • Diastolic dysfunction   • GERD (gastroesophageal reflux disease)   • Hypertension   • Parkinsons (CMS/HCC)   • Rheumatoid arthritis (CMS/HCC)   • Anemia, posthemorrhagic, acute   • Gastroesophageal reflux disease   • Urinary tract infection, site not specified   • Rheumatoid arthritis (CMS/HCC)   • Hypokalemia   • Atrial fibrillation (CMS/HCC)   • Abdominal pain   • Anxiety     Past Medical History:   Diagnosis Date   • Anxiety    • Aortic valve insufficiency    • Ataxia    • Diastolic dysfunction    • GERD (gastroesophageal reflux disease)    • H/O hernia repair     umbilical   • History of hip replacement     left   • Hypertension    • Insomnia    • Mitral regurgitation    • Parkinsons (CMS/HCC)    • Rheumatoid arthritis (CMS/HCC)    • Rotator cuff disorder     left side, also old fracture, doesn';t use this arm due to pain   • Spastic colon    • Tremor    • Tricuspid valve regurgitation    • UTI (urinary tract infection)     frequent   • Wears glasses      Past Surgical History:   Procedure Laterality Date   • APPENDECTOMY     • CATARACT EXTRACTION     • ENDOSCOPY N/A 2018    LA Grade B reflux esophagitis, HH    • HERNIA REPAIR     • HIP SURGERY Left    • HYSTERECTOMY     • REPLACEMENT TOTAL KNEE BILATERAL          PT ASSESSMENT (last 12 hours)      Physical Therapy Evaluation     Row Name 19 1513          PT Evaluation Time/Intention    Subjective Information  complains of;pain  -EM     Document Type  evaluation  -EM     Mode  of Treatment  physical therapy  -EM     Patient Effort  adequate  -EM     Row Name 02/13/19 1513          General Information    Onset of Illness/Injury or Date of Surgery  02/12/19  -EM     Patient Observations  alert;cooperative;agree to therapy  -EM     General Observations of Patient  elderly  female in supine, awake and alert   -EM     Prior Level of Function  independent:;all household mobility  -EM     Equipment Currently Used at Home  walker, rolling;cane, straight  -EM     Pertinent History of Current Functional Problem  admitted with hypokalemia, afib   -EM     Existing Precautions/Restrictions  fall  -EM     Row Name 02/13/19 1513          Relationship/Environment    Lives With  alone has someone who rents a room  -EM     Row Name 02/13/19 1513          Resource/Environmental Concerns    Current Living Arrangements  home/apartment/condo  -EM     Row Name 02/13/19 1513          Home Main Entrance    Number of Stairs, Main Entrance  one  -EM     Row Name 02/13/19 1513          Cognitive Assessment/Intervention- PT/OT    Orientation Status (Cognition)  oriented to;person;place  -EM     Follows Commands (Cognition)  follows one step commands;over 90% accuracy  -EM     Row Name 02/13/19 1513          Bed Mobility Assessment/Treatment    Bed Mobility Assessment/Treatment  rolling left;rolling right;supine-sit;sit-supine  -EM     Rolling Left [place] (Bed Mobility)  supervision  -EM     Rolling Right [place] (Bed Mobility)  supervision  -EM     Supine-Sit [place] (Bed Mobility)  contact guard  -EM     Sit-Supine [place] (Bed Mobility)  contact guard  -EM     Assistive Device (Bed Mobility)  bed rails  -EM     Row Name 02/13/19 1513          Transfer Assessment/Treatment    Transfer Assessment/Treatment  sit-stand transfer;stand-sit transfer  -EM     Sit-Stand [place] (Transfers)  contact guard  -EM     Stand-Sit [place] (Transfers)  contact guard  -EM     Row Name 02/13/19  1513          Sit-Stand Transfer    Assistive Device (Sit-Stand Transfers)  walker, front-wheeled  -EM     Row Name 02/13/19 1513          Stand-Sit Transfer    Assistive Device (Stand-Sit Transfers)  walker, front-wheeled  -EM     Row Name 02/13/19 1513          Gait/Stairs Assessment/Training    [place] Level (Gait)  contact guard  -EM     Assistive Device (Gait)  walker, front-wheeled  -EM     Distance in Feet (Gait)  20  -EM     Deviations/Abnormal Patterns (Gait)  stride length decreased;gait speed decreased  -EM     Bilateral Gait Deviations  forward flexed posture  -EM     Comment (Gait/Stairs)  leaning on rwx with forearms, states she cannot straighten up due to back pain  -EM     Row Name 02/13/19 1513          General ROM    GENERAL ROM COMMENTS  ROM WNL  UEs not tested, pt states she cannot lift UEs due to pain  -EM     [facility] Name 02/13/19 1513          MMT (Manual Muscle Testing)    General MMT Comments  no focal deficits identified, generally weak   -EM     [facility] Name 02/13/19 1513          Motor Assessment/Intervention    Additional Documentation  Balance (Group);Therapeutic Exercise (Group);Therapeutic Exercise Interventions (Group)  -EM     Row Name 02/13/19 1513          Therapeutic Exercise    Lower Extremity (Therapeutic Exercise)  LAQ (long arc quad), bilateral;marching while seated  -EM     Lower Extremity Range of Motion (Therapeutic Exercise)  ankle dorsiflexion/plantar flexion, bilateral  -EM     Comment (Therapeutic Exercise)  only 1-2 reps marching due to back pain   -EM     [facility] Name 02/13/19 1513          Balance    Balance  static sitting balance;static standing balance  -EM     [facility] Name 02/13/19 1513          Static Sitting Balance    Level of [place] (Unsupported Sitting, Static Balance)  supervision  -EM     Sitting Position (Unsupported Sitting, Static Balance)  sitting on edge of bed  -EM     Row Name 02/13/19 1513          Static Standing Balance    Level of [place]  (Supported Standing, Static Balance)  contact guard assist  -EM     Assistive Device Utilized (Supported Standing, Static Balance)  walker, rolling  -EM     Row Name 02/13/19 1513          Plan of Care Review    Plan of Care Reviewed With  patient  -EM     Row Name 02/13/19 1513          Physical Therapy Clinical Impression    Patient/Family Goals Statement (PT Clinical Impression)  decrease back pain, go home   -EM     Criteria for Skilled Interventions Met (PT Clinical Impression)  yes;treatment indicated  -EM     Impairments Found (describe specific impairments)  gait, locomotion, and balance;aerobic capacity/endurance  -EM     Rehab Potential (PT Clinical Summary)  good, to achieve stated therapy goals  -EM     Row Name 02/13/19 1513          Physical Therapy Goals    Bed Mobility Goal Selection (PT)  bed mobility, PT goal 1  -EM     Transfer Goal Selection (PT)  transfer, PT goal 1  -EM     Gait Training Goal Selection (PT)  gait training, PT goal 1  -EM     Row Name 02/13/19 1513          Bed Mobility Goal 1 (PT)    Activity/Assistive Device (Bed Mobility Goal 1, PT)  bed mobility activities, all  -EM     [place] Level/Cues Needed (Bed Mobility Goal 1, PT)  supervision required  -EM     Time Frame (Bed Mobility Goal 1, PT)  1 week  -EM     Row Name 02/13/19 1513          Transfer Goal 1 (PT)    Activity/Assistive Device (Transfer Goal 1, PT)  sit-to-stand/stand-to-sit;walker, rolling  -EM     [place] Level/Cues Needed (Transfer Goal 1, PT)  supervision required  -EM     Time Frame (Transfer Goal 1, PT)  1 week  -EM     Row Name 02/13/19 1513          Gait Training Goal 1 (PT)    Activity/Assistive Device (Gait Training Goal 1, PT)  walker, rolling  -EM     [place] Level (Gait Training Goal 1, PT)  contact guard assist  -EM     Distance (Gait Goal 1, PT)  100  -EM     Time Frame (Gait Training Goal 1, PT)  1 week  -EM     Row Name 02/13/19 1513          Positioning and Restraints    Pre-Treatment  Position  in bed  -EM     Post Treatment Position  bed  -EM     In Bed  supine;call light within reach;exit alarm on;notified nsg  -EM     Row Name 02/13/19 1513          Living Environment    Home Accessibility  stairs to enter home  -EM       User Key  (r) = Recorded By, (t) = Taken By, (c) = Cosigned By    Initials Name Provider Type    EM Clover Mason, PT Physical Therapist        Physical Therapy Education     Title: PT OT SLP Therapies (In Progress)     Topic: Physical Therapy (In Progress)     Point: Mobility training (In Progress)     Learning Progress Summary           Patient Acceptance, E, NR by EM at 2/13/2019  3:23 PM                               User Key     Initials Effective Dates Name Provider Type Discipline    EM 04/03/18 -  Clover Mason, PT Physical Therapist PT              PT Recommendation and Plan  Anticipated Discharge Disposition (PT): skilled nursing facility  Planned Therapy Interventions (PT Eval): bed mobility training, gait training, home exercise program  Therapy Frequency (PT Clinical Impression): daily  Outcome Summary/Treatment Plan (PT)  Anticipated Discharge Disposition (PT): skilled nursing facility  Plan of Care Reviewed With: patient  Outcome Summary: Patient is a 83 y.o. female admitted to [facility] for hypokalemia, afib on 2/12/2019. PMHx includes Parkinson's disease, RA. Patient is independent with rwx or cane at baseline and lives alone. Patient reports she rents a room in her house to someone. Today, patient performed bed mobility with CGA, required CGA for transfers, and ambulated 20 feet with rwx and CGA.  Patient demonstrates impairments consisting of back pain limiting mobility efforts, decreased activity tolerance due to c/o back pain, decreased balance, generalized weakness. Patient may benefit from skilled PT services acutely to address functional deficits as well as improve level of independence prior to discharge. Anticipate SNU upon DC. Patient unable  to clearly state timeline of events, states she fell and could not get up and that is why she is hospitalized now, also states she had back pain prior to this. Spoke with nsg that patient is clearly c/o back pain limiting ability to sit or stand erect, unsure of history of fall.   Outcome Measures     Row Name 02/13/19 1500             How much help from another person do you currently need...    Turning from your back to your side while in flat bed without using bedrails?  4  -EM      Moving from lying on back to sitting on the side of a flat bed without bedrails?  3  -EM      Moving to and from a bed to a chair (including a wheelchair)?  3  -EM      Standing up from a chair using your arms (e.g., wheelchair, bedside chair)?  3  -EM      Climbing 3-5 steps with a railing?  3  -EM      To walk in hospital room?  3  -EM      AM-PAC 6 Clicks Score  19  -EM        User Key  (r) = Recorded By, (t) = Taken By, (c) = Cosigned By    Initials Name Provider Type    Clover Valencia PT Physical Therapist         Time Calculation:   PT Charges     Row Name 02/13/19 1527             Time Calculation    Start Time  1430  -EM      Stop Time  1453  -EM      Time Calculation (min)  23 min  -EM      PT Received On  02/13/19  -EM      PT - Next Appointment  02/14/19  -EM      PT Goal Re-Cert Due Date  02/20/19  -EM         Time Calculation- PT    Total Timed Code Minutes- PT  10 minute(s)  -EM        User Key  (r) = Recorded By, (t) = Taken By, (c) = Cosigned By    Initials Name Provider Type    Clover Valencia PT Physical Therapist        Therapy Suggested Charges     Code   Minutes Charges    None           Therapy Charges for Today     Code Description Service Date Service Provider Modifiers Qty    20820925066 HC PT EVAL MOD COMPLEXITY 2 2/13/2019 Clover Mason, PT GP 1    51451983743 HC PT THER PROC EA 15 MIN 2/13/2019 Clover Mason PT GP 1          PT G-Codes  AM-PAC 6 Clicks Score:  19      Clover Mason, PT  2/13/2019

## 2019-02-14 LAB
ANION GAP SERPL CALCULATED.3IONS-SCNC: 12.8 MMOL/L
BUN BLD-MCNC: 10 MG/DL (ref 8–23)
BUN/CREAT SERPL: 14.9 (ref 7–25)
CALCIUM SPEC-SCNC: 9.3 MG/DL (ref 8.6–10.5)
CHLORIDE SERPL-SCNC: 96 MMOL/L (ref 98–107)
CO2 SERPL-SCNC: 31.2 MMOL/L (ref 22–29)
CREAT BLD-MCNC: 0.67 MG/DL (ref 0.57–1)
GFR SERPL CREATININE-BSD FRML MDRD: 84 ML/MIN/1.73
GLUCOSE BLD-MCNC: 98 MG/DL (ref 65–99)
POTASSIUM BLD-SCNC: 3.3 MMOL/L (ref 3.5–5.2)
SODIUM BLD-SCNC: 140 MMOL/L (ref 136–145)

## 2019-02-14 PROCEDURE — 80048 BASIC METABOLIC PNL TOTAL CA: CPT | Performed by: HOSPITALIST

## 2019-02-14 PROCEDURE — 99213 OFFICE O/P EST LOW 20 MIN: CPT | Performed by: INTERNAL MEDICINE

## 2019-02-14 PROCEDURE — G0378 HOSPITAL OBSERVATION PER HR: HCPCS

## 2019-02-14 RX ORDER — HYDROCODONE BITARTRATE AND ACETAMINOPHEN 5; 325 MG/1; MG/1
1 TABLET ORAL EVERY 4 HOURS PRN
Status: DISCONTINUED | OUTPATIENT
Start: 2019-02-14 | End: 2019-02-18 | Stop reason: HOSPADM

## 2019-02-14 RX ADMIN — HYDROCODONE BITARTRATE AND ACETAMINOPHEN 1 TABLET: 5; 325 TABLET ORAL at 17:23

## 2019-02-14 RX ADMIN — PAROXETINE HYDROCHLORIDE 20 MG: 20 TABLET, FILM COATED ORAL at 09:26

## 2019-02-14 RX ADMIN — FLUTICASONE PROPIONATE 2 SPRAY: 50 SPRAY, METERED NASAL at 09:27

## 2019-02-14 RX ADMIN — AMLODIPINE BESYLATE 10 MG: 10 TABLET ORAL at 09:26

## 2019-02-14 RX ADMIN — CARBIDOPA AND LEVODOPA 2 TABLET: 25; 100 TABLET ORAL at 17:23

## 2019-02-14 RX ADMIN — CARBIDOPA AND LEVODOPA 2 TABLET: 25; 100 TABLET ORAL at 09:26

## 2019-02-14 RX ADMIN — CARBIDOPA AND LEVODOPA 2 TABLET: 25; 100 TABLET ORAL at 22:22

## 2019-02-14 RX ADMIN — FUROSEMIDE 40 MG: 40 TABLET ORAL at 09:26

## 2019-02-14 RX ADMIN — Medication 1000 MCG: at 09:26

## 2019-02-14 RX ADMIN — CARBIDOPA AND LEVODOPA 2 TABLET: 25; 100 TABLET ORAL at 12:14

## 2019-02-14 RX ADMIN — SODIUM CHLORIDE, PRESERVATIVE FREE 3 ML: 5 INJECTION INTRAVENOUS at 09:45

## 2019-02-14 RX ADMIN — VITAMIN D, TAB 1000IU (100/BT) 1000 UNITS: 25 TAB at 09:26

## 2019-02-14 RX ADMIN — SODIUM CHLORIDE, PRESERVATIVE FREE 3 ML: 5 INJECTION INTRAVENOUS at 22:25

## 2019-02-14 RX ADMIN — POTASSIUM CHLORIDE 40 MEQ: 750 CAPSULE, EXTENDED RELEASE ORAL at 09:26

## 2019-02-14 RX ADMIN — HYDROCODONE BITARTRATE AND ACETAMINOPHEN 1 TABLET: 5; 325 TABLET ORAL at 12:14

## 2019-02-14 NOTE — NURSING NOTE
"AC Follow up: Pt just getting back in bed after using restroom. Pt is polite and cooperative and pleasant. She is mainly interested in talking about the pain in her right leg from her \"spine down\". Daughter at bedside states she has been dealing with this pain since prior to admission and that daughter is requesting more testing \"xrays\" to be done. \"Because something is in there poking her.\" When asked about therapist referrals, pt reports she is mainly interested in ones that come to her house. AC will have to find what in home resources may be available.  "

## 2019-02-14 NOTE — DISCHARGE PLACEMENT REQUEST
"Trang Liu (83 y.o. Female)     Date of Birth Social Security Number Address Home Phone MRN    1935  [address] [facility] [address] [phone] [id number]    [place] Marital Status          [facility]        Admission Date Admission Type Admitting Provider Attending Provider Department, Room/Bed    2/12/19 Emergency Romel Scruggs MD Baumann, Patrick D, MD [address], [address]    Discharge Date Discharge Disposition Discharge Destination                       Attending Provider:  Matthew Morgan MD    Allergies:  Cimetidine, Codeine, Doxycycline, Guaifenesin & Derivatives, Nitrofuran Derivatives, Oxaprozin, Penicillins, Sulfa Antibiotics    Isolation:  None   Infection:  None   Code Status:  CPR    Ht:  170.2 cm (67\")   Wt:  63.5 kg (140 lb)    Admission Cmt:  None   Principal Problem:  Hypokalemia [E87.6]                 Active Insurance as of 2/12/2019     Primary Coverage     Payor Plan Insurance Group Employer/Plan Group    HUMANA MEDICARE REPLACEMENT HUMANA MEDICARE REPL [id number]     Payor Plan Address Payor Plan Phone Number Payor Plan Fax Number Effective Dates    [address] [address] [phone]  1/1/2018 - None Entered    [facility] [address]       Subscriber Name Subscriber Birth Date Member ID       TRANG LIU 1935 [id number]                 Emergency Contacts      (Rel.) Home Phone Work Phone Mobile Phone    Bekah Doherty (Daughter) [phone] -- [phone]    Dinorah Little -- -- [phone]              "

## 2019-02-14 NOTE — PROGRESS NOTES
Hospital Follow Up    LOS:  LOS: 0 days   Patient Name: Trang Liu  Age/Sex: 83 y.o. female  : 1935  MRN: [id number]    Day of Service: 19   Length of Stay: 0  Encounter Provider: Ranjan Sarmiento MD  Place of Service: [facility] CARDIOLOGY  Patient Care Team:  Nayla Higginbotham APRN as PCP - General (Family Medicine)    Subjective:     Chief Complaint: Paroxysmal atrial fibrillation.    Interval History: Patient continues to improve.    Objective:     Objective:  Temp:  [97.7 °F (36.5 °C)-98.6 °F (37 °C)] 98.6 °F (37 °C)  Heart Rate:  [72-88] 72  Resp:  [16-22] 16  BP: (102-121)/(58-71) 120/64     Intake/Output Summary (Last 24 hours) at 2019 0924  Last data filed at 2019 0900  Gross per 24 hour   Intake 1080 ml   Output 550 ml   Net 530 ml     Body mass index is 21.93 kg/m².      19  0600 19  1432 19  1712   Weight: 63.7 kg (140 lb 6.4 oz) 63.5 kg (140 lb) 63.5 kg (140 lb)     Weight change: 0 kg (0 lb)      Physical Exam:   General : Alert, cooperative, in no acute distress.  Neuro: alert,cooperative and oriented  Lungs: CTAB. Normal respiratory effort and rate.  CV:: Regular rate and rhythm, normal S1 and S2, no murmurs, gallops or rubs.  ABD: Soft, nontender, non-distended. positive bowel sounds  Extr: No edema or cyanosis, moves all extremities    Lab Review:   Results from last 7 days   Lab Units 19  0306 19  0546  19  0612   SODIUM mmol/L 140 143   < > 138   POTASSIUM mmol/L 3.3* 2.8*   < > 2.3*   CHLORIDE mmol/L 96* 98   < > 90*   CO2 mmol/L 31.2* 32.2*   < > 32.0*   BUN mg/dL 10 7*   < > 14   CREATININE mg/dL 0.67 0.71   < > 0.68   GLUCOSE mg/dL 98 93   < > 102*   CALCIUM mg/dL 9.3 9.5   < > 10.0   AST (SGOT) U/L  --   --   --  9   ALT (SGPT) U/L  --   --   --  6    < > = values in this interval not displayed.     Results from last 7 days   Lab Units 19  1525 19  1017   TROPONIN T ng/mL  <0.010 <0.010     Results from last 7 days   Lab Units 02/12/19  0612   WBC 10*3/mm3 8.45   HEMOGLOBIN g/dL 13.8   HEMATOCRIT % 40.5   PLATELETS 10*3/mm3 188         Results from last 7 days   Lab Units 02/12/19  0612   MAGNESIUM mg/dL 1.7           Invalid input(s): LDLCALC      Results from last 7 days   Lab Units 02/12/19  1017   TSH mIU/mL 1.820     I reviewed the patient's new clinical results.  I personally viewed and interpreted the patient's EKG  Current Medications:   Scheduled Meds:  amLODIPine 10 mg Oral Daily   budesonide-formoterol 2 puff Inhalation BID - RT   carbidopa-levodopa 2 tablet Oral 4x Daily   cholecalciferol 1,000 Units Oral Daily   fluticasone 2 spray Nasal Daily   furosemide 40 mg Oral Daily   pantoprazole 40 mg Oral Daily   PARoxetine 20 mg Oral QAM   potassium chloride 40 mEq Oral Daily   sodium chloride 3 mL Intravenous Q12H   sucralfate 1 g Oral 4x Daily   vitamin B-12 1,000 mcg Oral Daily     Continuous Infusions:     Allergies:  Allergies   Allergen Reactions   • Cimetidine Unknown (See Comments)     unknown   • Codeine Itching   • Doxycycline Hives   • Guaifenesin & Derivatives Unknown (See Comments)     Unknown     • Nitrofuran Derivatives Itching   • Oxaprozin Itching   • Penicillins Itching     unknown   • Sulfa Antibiotics Rash     And itching       Assessment:       Hypokalemia    Atrial fibrillation (CMS/HCC)    Abdominal pain    Anxiety        Plan:     1.  Paroxysmal atrial fibrillation.  Appears to be in sinus with PACs this morning.  2.  Abdominal pain workup in progress.  3.  Chronic dyspnea  4.  Hypokalemia.  Obviously can contribute to the atrial arrhythmias.    5.   Patient is stable from a cardiovascular standpoint.  We will sign off.  Patient to follow-up in 4-6 weeks as an outpatient.             Ranjan Sarmiento MD  02/14/19  9:24 AM

## 2019-02-14 NOTE — PROGRESS NOTES
Name: Trang Liu ADMIT: 2019   : 1935  PCP: Nayla Higginbotham APRN    MRN: [id number] LOS: 0 days   AGE/SEX: 83 y.o. female  ROOM: [facility]   Subjective   No CP palpitations SOA NVD overnight. Ambulated with PT this morning. Difficulty with standing straight/ambulation.    Objective   Vital Signs  Temp:  [97.7 °F (36.5 °C)-98.6 °F (37 °C)] 98.6 °F (37 °C)  Heart Rate:  [72-86] 72  Resp:  [16-22] 16  BP: (102-121)/(58-71) 120/64  SpO2:  [91 %-93 %] 93 %  on   ;   Device (Oxygen Therapy): room air  Body mass index is 21.93 kg/m².    Physical Exam   Constitutional: She appears well-developed. No distress.   frail   HENT:   Head: Atraumatic.   Nose: Nose normal.   Eyes: Conjunctivae and EOM are normal.   Neck: Normal range of motion. Neck supple.   Cardiovascular: Normal rate, regular rhythm and intact distal pulses.   Pulmonary/Chest: Effort normal. She has decreased breath sounds. She has no wheezes.   Abdominal: Soft. She exhibits no distension. There is no guarding.   Musculoskeletal: She exhibits no edema or tenderness.   Neurological: She is alert. No cranial nerve deficit.   Skin: Skin is warm and dry. She is not diaphoretic.   Psychiatric: She has a normal mood and affect. Her behavior is normal.   Nursing note and vitals reviewed.      Results Review:       I reviewed the patient's new clinical results.     I reviewed imaging, agree with interpretation.     I reviewed telemetry/EKG results, sinus rhythm      Results from last 7 days   Lab Units 19  0612   WBC 10*3/mm3 8.45   HEMOGLOBIN g/dL 13.8   PLATELETS 10*3/mm3 188     Results from last 7 days   Lab Units 19  0306 19  0546 19  1525 19  0612   SODIUM mmol/L 140 143 139 138   POTASSIUM mmol/L 3.3* 2.8* 2.7* 2.3*   CHLORIDE mmol/L 96* 98 96* 90*   CO2 mmol/L 31.2* 32.2* 30.4* 32.0*   BUN mg/dL 10 7* 11 14   CREATININE mg/dL 0.67 0.71 0.64 0.68   GLUCOSE mg/dL 98 93 108* 102*   Estimated Creatinine  Clearance: 54.3 mL/min (by C-G formula based on SCr of 0.67 mg/dL).  Results from last 7 days   Lab Units 02/14/19  0306 02/13/19  0546 02/12/19  1525 02/12/19  0612   CALCIUM mg/dL 9.3 9.5 9.1 10.0   ALBUMIN g/dL  --   --   --  4.10   MAGNESIUM mg/dL  --   --   --  1.7         amLODIPine 10 mg Oral Daily   budesonide-formoterol 2 puff Inhalation BID - RT   carbidopa-levodopa 2 tablet Oral 4x Daily   cholecalciferol 1,000 Units Oral Daily   fluticasone 2 spray Nasal Daily   furosemide 40 mg Oral Daily   pantoprazole 40 mg Oral Daily   PARoxetine 20 mg Oral QAM   potassium chloride 40 mEq Oral Daily   sodium chloride 3 mL Intravenous Q12H   sucralfate 1 g Oral 4x Daily   vitamin B-12 1,000 mcg Oral Daily      Diet Regular; Cardiac      Assessment/Plan      Active Hospital Problems    Diagnosis Date Noted   • **Hypokalemia [E87.6] 02/12/2019   • Atrial fibrillation (CMS/HCC) [I48.91] 02/12/2019   • Abdominal pain [R10.9] 02/12/2019   • Anxiety [F41.9] 02/12/2019      Resolved Hospital Problems   No resolved problems to display.     - Hypokalemia: Improving but not back up to normal range yet. Will continue potassium replacements. Repeat magnesium level with am labs.  - PACs v AFib: Has grade 2 diastolic dysfunction on echo. Not acutely volume overloaded on exam. Is on lasix chronically. Cardiology following.  - Abdominal Pain: Workup negative. No pain today.  - Noncompliance: Access following.  - Fall/Parkinsons/Weakness: PT/OT. Consult CCP.  - Disposition: HH v SNF/1-2 days    >35 minutes time spent    Matthew Morgan MD  [facility]  02/14/19  9:37 AM

## 2019-02-14 NOTE — PROGRESS NOTES
Adult Nutrition  Assessment/PES    Patient Name:  Trang Liu  YOB: 1935  MRN: [id number]  Admit Date:  2/12/2019    Assessment Date:  2/14/2019    Comments:  Follow up.  Patient reports appetite good sometimes and bad sometimes.  She reports some days she will eat well and others not eat at all.  She reports weight loss over past couple of years.  Per chart weight history, 22# weight loss (13.6%) x 9 months.      Patient reports nothing sounds good to her at this time.  She reports she was able to eat some of her breakfast this am.  She does not like oral nutrition supplements and won't agree to try any during admission.    Noted depression issues per chart.  Access has seen patient.    MSA completed for moderate malnutrition.    Will continue to follow.    Reason for Assessment     Row Name 02/14/19 1253          Reason for Assessment    Reason For Assessment  follow-up protocol         Nutrition/Diet History     Row Name 02/14/19 1253          Nutrition/Diet History    Typical Food/Fluid Intake  patient reports appetite comes and goes, somedays she eats great, other days she eats nothing     Supplemental Drinks/Foods/Additives  does not like supplements and says they don't like her, does not want to even try anything while here         Anthropometrics     Row Name 02/14/19 1254          Admit Weight    Admit Weight  -- 140# 2/13        Usual Body Weight (UBW)    Usual Body Weight  95.3 kg (210 lb) couple years ago      % of Usual Body Weight Assessment  less than 74% - severe deficit 66.7%     Weight Loss  unintentional 22# (13.6%)     Weight Loss Time Frame  9 months        Body Mass Index (BMI)    BMI Assessment  BMI 18.5-24.9: normal         Labs/Tests/Procedures/Meds     Row Name 02/14/19 1256          Labs/Procedures/Meds    Lab Results Reviewed  reviewed, pertinent     Lab Results Comments  K+        Diagnostic Tests/Procedures    Diagnostic Test/Procedure Reviewed  reviewed,  pertinent        Medications    Pertinent Medications Reviewed  reviewed, pertinent     Pertinent Medications Comments  vit D3, lasix, protonix, KCl, carafate, vit B12         Physical Findings     Row Name 02/14/19 1257          Physical Findings    Overall Physical Appearance  generalized wasting;loss of subcutaneous fat;loss of muscle mass     Skin  -- B=19, bruised         Estimated/Assessed Needs     Row Name 02/14/19 1257          Calculation Measurements    Weight Used For Calculations  63.5 kg (139 lb 15.9 oz)        Estimated/Assessed Needs    Additional Documentation  KCAL/KG (Group);Protein Requirements (Group);Fluid Requirements (Group)        KCAL/KG    14 Kcal/Kg (kcal)  889     15 Kcal/Kg (kcal)  952.5     18 Kcal/Kg (kcal)  1143     20 Kcal/Kg (kcal)  1270     25 Kcal/Kg (kcal)  1587.5     30 Kcal/Kg (kcal)  1905     35 Kcal/Kg (kcal)  2222.5     40 Kcal/Kg (kcal)  2540     45 Kcal/Kg (kcal)  2857.5     50 Kcal/Kg (kcal)  3175     kcal/kg (Specify)  -- 1905        Smyth-St. Jeor Equation    RMR (Smyth-St. Jeor Equation)  1127.63        Protein Requirements    Est Protein Requirement Amount (gms/kg)  1.2 gm protein     Estimated Protein Requirements (gms/day)  76.2        Fluid Requirements    Estimated Fluid Requirements (mL/day)  1905     Estimated Fluid Requirement Method  RDA Method     RDA Method (mL)  1905     Hazel-Susan Method (over 20 kg)  2770         Nutrition Prescription Ordered     Row Name 02/14/19 1258          Nutrition Prescription PO    Current PO Diet  Regular     Common Modifiers  Cardiac         Evaluation of Received Nutrient/Fluid Intake     Row Name 02/14/19 1258 02/14/19 1257       Calculation Measurements    Weight Used For Calculations  --  63.5 kg (139 lb 15.9 oz)       PO Evaluation    Number of Meals  3  --    % PO Intake  58  --        Evaluation of Prescribed Nutrient/Fluid Intake     Row Name 02/14/19 1257          Calculation Measurements    Weight Used For  Calculations  63.5 kg (139 lb 15.9 oz)         Malnutrition Severity Assessment     Row Name 02/14/19 1258          Malnutrition Severity Assessment    Malnutrition Type  Chronic Illness Malnutrition        Physical Signs of Malnutrition (Chronic)    Muscle Wasting  Mild moderate clavicle region, temporal region, severe interossous region     Fat Loss  Mild mild triceps region; moderate-severe orbital region        Weight Status (Chronic)    %UBW  Severe (<75%)     Weight Loss  Mild (>10% / 1 yr)        Energy Intake Status (Chronic)    Energy Intake  Mod (<75% / > or equal to 1 mo)        Criteria Met (Must meet criteria for severity in at least 2 of these categories: M Wasting, Fat Loss, Fluid, Secondary Signs, Wt. Status, Intake)    Patient meets criteria for   Moderate malnutrition           Problem/Interventions:    Problem 2     Row Name 02/14/19 1300          Nutrition Diagnoses Problem 2    Problem 2  Malnutrition     Etiology (related to)  Medical Diagnosis     Neurological  Parkinson's     Psychosocial  Depression     Signs/Symptoms (evidenced by)  Unintended Weight Change;% UBW;Report/Observation     Percent (%) IBW  66.7 %     Reported/Observed By  Patient     Unintended Weight Change  Loss     Number of Pounds Lost  22     Weight loss time period  9 months               Intervention Goal     Row Name 02/14/19 1303          Intervention Goal    General  Maintain nutrition;Reduce/improve symptoms;Disease management/therapy;Meet nutritional needs for age/condition     PO  Increase intake;PO intake (%)     PO Intake %  75 %     Weight  Maintain weight         Nutrition Intervention     Row Name 02/14/19 1303          Nutrition Intervention    RD/Tech Action  Follow Tx progress;Care plan reviewd;Encourage intake;Supplement offered/refused           Education/Evaluation     Row Name 02/14/19 1303          Education    Education  Will Instruct as appropriate        Monitor/Evaluation    Monitor  Per  protocol;PO intake;Pertinent labs;Weight;Symptoms     Education Follow-up  Reinforce PRN           Electronically signed by:  Aniyah Cosby RD  02/14/19 1:04 PM

## 2019-02-14 NOTE — PROGRESS NOTES
Discharge Planning Assessment  [facility]     Patient Name: Trang Liu  MRN: [id number]  Today's Date: 2/14/2019    Admit Date: 2/12/2019  Discharge Plan     Row Name 02/14/19 1605       Plan    Plan  Trino Bull, referral pending     Plan Comments  Spoke with pt and her dtr at bedside. PT eval noted and discussed. Pt is agreeable to [facility]. Pt has been to [facility] in the past and would like to go back. Referral made to [facility]. CCP to follow. JChastumuRN/CCP         Destination      Service Provider Request Status Selected Services Address Phone Number Fax Number    [facility] Pending - Request Sent N/A [phone] [facility] [address] [phone]     Andra Raza RN

## 2019-02-15 PROBLEM — E83.42 HYPOMAGNESEMIA: Status: ACTIVE | Noted: 2019-02-15

## 2019-02-15 LAB
ANION GAP SERPL CALCULATED.3IONS-SCNC: 11.1 MMOL/L
BUN BLD-MCNC: 9 MG/DL (ref 8–23)
BUN/CREAT SERPL: 12.9 (ref 7–25)
CALCIUM SPEC-SCNC: 9.3 MG/DL (ref 8.6–10.5)
CHLORIDE SERPL-SCNC: 95 MMOL/L (ref 98–107)
CO2 SERPL-SCNC: 31.9 MMOL/L (ref 22–29)
CREAT BLD-MCNC: 0.7 MG/DL (ref 0.57–1)
DEPRECATED RDW RBC AUTO: 50.6 FL (ref 37–54)
ERYTHROCYTE [DISTWIDTH] IN BLOOD BY AUTOMATED COUNT: 14.3 % (ref 12.3–15.4)
GFR SERPL CREATININE-BSD FRML MDRD: 80 ML/MIN/1.73
GLUCOSE BLD-MCNC: 84 MG/DL (ref 65–99)
HCT VFR BLD AUTO: 37.6 % (ref 34–46.6)
HGB BLD-MCNC: 12.8 G/DL (ref 12–15.9)
MAGNESIUM SERPL-MCNC: 1.5 MG/DL (ref 1.6–2.4)
MCH RBC QN AUTO: 33.2 PG (ref 26.6–33)
MCHC RBC AUTO-ENTMCNC: 34 G/DL (ref 31.5–35.7)
MCV RBC AUTO: 97.4 FL (ref 79–97)
PLATELET # BLD AUTO: 197 10*3/MM3 (ref 140–450)
PMV BLD AUTO: 12.6 FL (ref 6–12)
POTASSIUM BLD-SCNC: 3.2 MMOL/L (ref 3.5–5.2)
RBC # BLD AUTO: 3.86 10*6/MM3 (ref 3.77–5.28)
SODIUM BLD-SCNC: 138 MMOL/L (ref 136–145)
WBC NRBC COR # BLD: 6.46 10*3/MM3 (ref 3.4–10.8)

## 2019-02-15 PROCEDURE — 85027 COMPLETE CBC AUTOMATED: CPT | Performed by: INTERNAL MEDICINE

## 2019-02-15 PROCEDURE — 80048 BASIC METABOLIC PNL TOTAL CA: CPT | Performed by: INTERNAL MEDICINE

## 2019-02-15 PROCEDURE — 94799 UNLISTED PULMONARY SVC/PX: CPT

## 2019-02-15 PROCEDURE — 97535 SELF CARE MNGMENT TRAINING: CPT

## 2019-02-15 PROCEDURE — 97110 THERAPEUTIC EXERCISES: CPT

## 2019-02-15 PROCEDURE — 97165 OT EVAL LOW COMPLEX 30 MIN: CPT

## 2019-02-15 PROCEDURE — 25010000002 ONDANSETRON PER 1 MG: Performed by: HOSPITALIST

## 2019-02-15 PROCEDURE — 83735 ASSAY OF MAGNESIUM: CPT | Performed by: INTERNAL MEDICINE

## 2019-02-15 RX ORDER — MAGNESIUM SULFATE HEPTAHYDRATE 40 MG/ML
2 INJECTION, SOLUTION INTRAVENOUS AS NEEDED
Status: DISCONTINUED | OUTPATIENT
Start: 2019-02-15 | End: 2019-02-18 | Stop reason: HOSPADM

## 2019-02-15 RX ORDER — HYDROCODONE BITARTRATE AND ACETAMINOPHEN 5; 325 MG/1; MG/1
2 TABLET ORAL EVERY 4 HOURS PRN
Status: DISCONTINUED | OUTPATIENT
Start: 2019-02-15 | End: 2019-02-18 | Stop reason: HOSPADM

## 2019-02-15 RX ORDER — SENNA AND DOCUSATE SODIUM 50; 8.6 MG/1; MG/1
2 TABLET, FILM COATED ORAL 2 TIMES DAILY PRN
Status: DISCONTINUED | OUTPATIENT
Start: 2019-02-15 | End: 2019-02-18 | Stop reason: HOSPADM

## 2019-02-15 RX ORDER — MAGNESIUM SULFATE HEPTAHYDRATE 40 MG/ML
4 INJECTION, SOLUTION INTRAVENOUS AS NEEDED
Status: DISCONTINUED | OUTPATIENT
Start: 2019-02-15 | End: 2019-02-18 | Stop reason: HOSPADM

## 2019-02-15 RX ORDER — BISACODYL 10 MG
10 SUPPOSITORY, RECTAL RECTAL DAILY PRN
Status: DISCONTINUED | OUTPATIENT
Start: 2019-02-15 | End: 2019-02-18 | Stop reason: HOSPADM

## 2019-02-15 RX ADMIN — Medication 1000 MCG: at 09:15

## 2019-02-15 RX ADMIN — CARBIDOPA AND LEVODOPA 2 TABLET: 25; 100 TABLET ORAL at 20:01

## 2019-02-15 RX ADMIN — BUDESONIDE AND FORMOTEROL FUMARATE DIHYDRATE 2 PUFF: 160; 4.5 AEROSOL RESPIRATORY (INHALATION) at 20:46

## 2019-02-15 RX ADMIN — FUROSEMIDE 40 MG: 40 TABLET ORAL at 09:15

## 2019-02-15 RX ADMIN — BUDESONIDE AND FORMOTEROL FUMARATE DIHYDRATE 2 PUFF: 160; 4.5 AEROSOL RESPIRATORY (INHALATION) at 06:32

## 2019-02-15 RX ADMIN — SODIUM CHLORIDE, PRESERVATIVE FREE 3 ML: 5 INJECTION INTRAVENOUS at 09:30

## 2019-02-15 RX ADMIN — PANTOPRAZOLE SODIUM 40 MG: 40 TABLET, DELAYED RELEASE ORAL at 09:16

## 2019-02-15 RX ADMIN — HYDROCODONE BITARTRATE AND ACETAMINOPHEN 1 TABLET: 5; 325 TABLET ORAL at 19:54

## 2019-02-15 RX ADMIN — VITAMIN D, TAB 1000IU (100/BT) 1000 UNITS: 25 TAB at 09:15

## 2019-02-15 RX ADMIN — CARBIDOPA AND LEVODOPA 2 TABLET: 25; 100 TABLET ORAL at 11:53

## 2019-02-15 RX ADMIN — PAROXETINE HYDROCHLORIDE 20 MG: 20 TABLET, FILM COATED ORAL at 09:16

## 2019-02-15 RX ADMIN — HYDROCODONE BITARTRATE AND ACETAMINOPHEN 1 TABLET: 5; 325 TABLET ORAL at 09:16

## 2019-02-15 RX ADMIN — CARBIDOPA AND LEVODOPA 2 TABLET: 25; 100 TABLET ORAL at 09:15

## 2019-02-15 RX ADMIN — CARBIDOPA AND LEVODOPA 2 TABLET: 25; 100 TABLET ORAL at 18:01

## 2019-02-15 RX ADMIN — POTASSIUM CHLORIDE 40 MEQ: 750 CAPSULE, EXTENDED RELEASE ORAL at 11:53

## 2019-02-15 RX ADMIN — ONDANSETRON HYDROCHLORIDE 4 MG: 2 SOLUTION INTRAMUSCULAR; INTRAVENOUS at 09:16

## 2019-02-15 RX ADMIN — AMLODIPINE BESYLATE 10 MG: 10 TABLET ORAL at 09:15

## 2019-02-15 RX ADMIN — POLYETHYLENE GLYCOL 3350 17 G: 17 POWDER, FOR SOLUTION ORAL at 11:53

## 2019-02-15 RX ADMIN — FLUTICASONE PROPIONATE 2 SPRAY: 50 SPRAY, METERED NASAL at 09:16

## 2019-02-15 RX ADMIN — POTASSIUM CHLORIDE 40 MEQ: 750 CAPSULE, EXTENDED RELEASE ORAL at 09:15

## 2019-02-15 RX ADMIN — SODIUM CHLORIDE, PRESERVATIVE FREE 3 ML: 5 INJECTION INTRAVENOUS at 20:01

## 2019-02-15 NOTE — PLAN OF CARE
Problem: Patient Care Overview  Goal: Plan of Care Review  Outcome: Ongoing (interventions implemented as appropriate)   02/15/19 3571   Coping/Psychosocial   Plan of Care Reviewed With patient   Plan of Care Review   Progress no change   OTHER   Outcome Summary Pt tolerated treatment fair this date. Continues to have increased LBP w/ mobility. Required min A to get back into bed to assist w/ LEs. CGA to ambulate 12ft w/ Rw. Initially began fairly upright, though snf, pt leaned forearms onto walker d/t pain. PT will continue to address functional mobility deficits as tolerated.

## 2019-02-15 NOTE — THERAPY EVALUATION
Acute Care - Occupational Therapy Initial Evaluation  [facility]     Patient Name: Trang Liu  : 1935  MRN: [id number]  Today's Date: 2/15/2019  Onset of Illness/Injury or Date of Surgery: 19          Admit Date: 2019       ICD-10-CM ICD-9-CM   1. Hypokalemia E87.6 276.8   2. Atrial fibrillation, unspecified type (CMS/HCC) I48.91 427.31   3. Decreased mobility R26.89 781.99     Patient Active Problem List   Diagnosis   • Upper GI bleed   • Diastolic dysfunction   • GERD (gastroesophageal reflux disease)   • Hypertension   • Parkinsons (CMS/HCC)   • Rheumatoid arthritis (CMS/HCC)   • Anemia, posthemorrhagic, acute   • Gastroesophageal reflux disease   • Urinary tract infection, site not specified   • Rheumatoid arthritis (CMS/HCC)   • Hypokalemia   • Atrial fibrillation (CMS/HCC)   • Abdominal pain   • Anxiety   • Hypomagnesemia     Past Medical History:   Diagnosis Date   • Anxiety    • Aortic valve insufficiency    • Ataxia    • Diastolic dysfunction    • GERD (gastroesophageal reflux disease)    • H/O hernia repair     umbilical   • History of hip replacement     left   • Hypertension    • Insomnia    • Mitral regurgitation    • Parkinsons (CMS/HCC)    • Rheumatoid arthritis (CMS/HCC)    • Rotator cuff disorder     left side, also old fracture, doesn';t use this arm due to pain   • Spastic colon    • Tremor    • Tricuspid valve regurgitation    • UTI (urinary tract infection)     frequent   • Wears glasses      Past Surgical History:   Procedure Laterality Date   • APPENDECTOMY     • CATARACT EXTRACTION     • ENDOSCOPY N/A 2018    LA Grade B reflux esophagitis, HH    • HERNIA REPAIR     • HIP SURGERY Left    • HYSTERECTOMY     • REPLACEMENT TOTAL KNEE BILATERAL            OT ASSESSMENT FLOWSHEET (last 72 hours)      Occupational Therapy Evaluation     Row Name 02/15/19 1200                   OT Evaluation Time/Intention    Subjective Information  complains of;pain  -SG         Document Type  evaluation;therapy note (daily note)  -        Mode of Treatment  occupational therapy;individual therapy  -SG        Patient Effort  fair  -SG           General Information    Patient Profile Reviewed?  yes  -SG        Patient Observations  alert;cooperative;agree to therapy  -SG        General Observations of Patient  supine in bed  -SG        Prior Level of Function  independent:;ADL's  -SG        Equipment Currently Used at Home  walker, rolling;cane, straight  -SG        Existing Precautions/Restrictions  fall  -           Cognitive Assessment/Intervention- PT/OT    Orientation Status (Cognition)  oriented x 3  -SG        Follows Commands (Cognition)  follows one step commands  -           ADL Assessment/Intervention    BADL Assessment/Intervention  upper body dressing;lower body dressing;grooming;toileting  -           Upper Body Dressing Assessment/Training    Upper Body Dressing [place] Level  upper body dressing skills;supervision  -        Upper Body Dressing Position  sitting up in bed  -           Lower Body Dressing Assessment/Training    Lower Body Dressing [place] Level  lower body dressing skills;minimum assist (75% patient effort)  -        Lower Body Dressing Position  supported sitting  -           BADL Safety/Performance    Impairments, BADL Safety/Performance  pain  -        Skilled BADL Treatment/Intervention  BADL process/adaptation training  -           General ROM    GENERAL ROM COMMENTS  right shld trace movement.  Left shld 2/3 shld AROM. Pt states right UE limited for several years  -           Static Sitting Balance    Level of [place] (Unsupported Sitting, Static Balance)  supervision  -SG           Positioning and Restraints    Pre-Treatment Position  in bed  -SG        Post Treatment Position  bed  -SG        In Bed  call light within reach;encouraged to call for assist;exit alarm on  -SG           Pain Scale: Numbers  Pre/Post-Treatment    Pain Scale: Numbers, Pretreatment  8/10  -SG        Pain Scale: Numbers, Post-Treatment  8/10  -SG        Pain Location  back  -SG        Pain Intervention(s)  Repositioned  -SG           Clinical Impression (OT)    OT Diagnosis  need for assist wtih personal care  -SG        Criteria for Skilled Therapeutic Interventions Met (OT Eval)  yes;treatment indicated  -SG        Therapy Frequency (OT Eval)  5 times/wk  -SG        Care Plan Review (OT)  evaluation/treatment results reviewed  -SG           Planned OT Interventions    Planned Therapy Interventions (OT Eval)  BADL retraining;transfer/mobility retraining;adaptive equipment training  -SG           OT Goals    Transfer Goal Selection (OT)  transfer, OT goal 1  -SG        Dressing Goal Selection (OT)  dressing, OT goal 1  -SG        Balance Goal Selection (OT)  balance, OT goal 1  -SG        Additional Documentation  Balance Goal Selection (OT) (Row)  -SG           Transfer Goal 1 (OT)    Activity/Assistive Device (Transfer Goal 1, OT)  toilet  -SG        [place] Level/Cues Needed (Transfer Goal 1, OT)  minimum assist (75% or more patient effort)  -SG        Time Frame (Transfer Goal 1, OT)  1 week  -SG        Progress/Outcome (Transfer Goal 1, OT)  goal ongoing  -SG           Dressing Goal 1 (OT)    Activity/Assistive Device (Dressing Goal 1, OT)  dressing skills, all  -SG        [place]/Cues Needed (Dressing Goal 1, OT)  minimum assist (75% or more patient effort)  -SG        Time Frame (Dressing Goal 1, OT)  1 week  -SG        Progress/Outcome (Dressing Goal 1, OT)  goal ongoing  -SG           Balance Goal 1 (OT)    Activity/Assistive Device (Balance Goal 1, OT)  standing, static  -SG        [place] Level/Cues Needed (Balance Goal 1, OT)  contact guard assist  -SG        Time Frame (Balance Goal 1, OT)  1 week  -SG        Progress/Outcomes (Balance Goal 1, OT)  goal ongoing  -SG          User Key  (r) = Recorded By, (t) =  Taken By, (c) = Cosigned By    Initials Name Effective Dates     Tammi Villarreal, OTR 06/08/18 -          Occupational Therapy Education     Title: PT OT SLP Therapies (In Progress)     Topic: Occupational Therapy (In Progress)     Point: ADL training (Done)     Description: Instruct learner(s) on proper safety adaptation and remediation techniques during self care or transfers.   Instruct in proper use of assistive devices.    Learning Progress Summary           Patient Acceptance, E,TB, VU,NR by  at 2/15/2019 12:53 PM    Comment:  Role of OT and POC. Safety for adls                               User Key     Initials Effective Dates Name Provider Type Discipline     06/08/18 -  Tammi Villarreal, OTR Occupational Therapist OT                  OT Recommendation and Plan  Planned Therapy Interventions (OT Eval): BADL retraining, transfer/mobility retraining, adaptive equipment training  Therapy Frequency (OT Eval): 5 times/wk  Plan of Care Review  Plan of Care Reviewed With: patient  Plan of Care Reviewed With: patient  Outcome Summary: Pt presents with decreased independence for adl tasks. Pt reports some difficulty with dressing task PTA. May benefit from AE teaching to assist and functional standing balance tasks. Will continue to benefit from OT    Outcome Measures     Row Name 02/15/19 1255 02/15/19 1200 02/13/19 1500       How much help from another person do you currently need...    Turning from your back to your side while in flat bed without using bedrails?  --  4  -SM  4  -EM    Moving from lying on back to sitting on the side of a flat bed without bedrails?  --  3  -SM  3  -EM    Moving to and from a bed to a chair (including a wheelchair)?  --  3  -SM  3  -EM    Standing up from a chair using your arms (e.g., wheelchair, bedside chair)?  --  3  -SM  3  -EM    Climbing 3-5 steps with a railing?  --  2  -SM  3  -EM    To walk in hospital room?  --  3  -SM  3  -EM    AM-PAC 6 Clicks Score  --  18  -SM   19  -       How much help from another is currently needed...    Putting on and taking off regular lower body clothing?  2  -SG  --  --    Bathing (including washing, rinsing, and drying)  2  -SG  --  --    Toileting (which includes using toilet bed pan or urinal)  3  -SG  --  --    Putting on and taking off regular upper body clothing  3  -SG  --  --    Taking care of personal grooming (such as brushing teeth)  3  -SG  --  --    Eating meals  3  -SG  --  --    Score  16  -SG  --  --       Functional Assessment    Outcome Measure Options  AM-PAC 6 Clicks Daily Activity (OT)  -SG  AM-PAC 6 Clicks Basic Mobility (PT)  -  --      User Key  (r) = Recorded By, (t) = Taken By, (c) = Cosigned By    Initials Name Provider Type    Tammi Cook OTR Occupational Therapist    EM Clover Mason, PT Physical Therapist    Anali Queen, PTA Physical Therapy Assistant          Time Calculation:   Time Calculation- OT     Row Name 02/15/19 1256             Time Calculation- OT    OT Start Time  1007  -      OT Stop Time  1033  -      OT Time Calculation (min)  26 min  -      Total Timed Code Minutes- OT  16 minute(s)  -      OT Received On  02/15/19  -      OT Goal Re-Cert Due Date  02/22/19  -        User Key  (r) = Recorded By, (t) = Taken By, (c) = Cosigned By    Initials Name Provider Type    Tammi Cook OTR Occupational Therapist        Therapy Suggested Charges     Code   Minutes Charges    None           Therapy Charges for Today     Code Description Service Date Service Provider Modifiers Qty    76980343007  OT EVAL LOW COMPLEXITY 2 2/15/2019 Tammi Villarreal OTR GO 1    [id number]  OT SELF CARE/MGMT/TRAIN EA 15 MIN 2/15/2019 Tammi Villarreal OTR GO 1               JESSI Pop  2/15/2019

## 2019-02-15 NOTE — THERAPY TREATMENT NOTE
Acute Care - Physical Therapy Treatment Note  [facility]     Patient Name: Trang Liu  : 1935  MRN: [id number]  Today's Date: 2/15/2019  Onset of Illness/Injury or Date of Surgery: 19          Admit Date: 2019    Visit Dx:    ICD-10-CM ICD-9-CM   1. Hypokalemia E87.6 276.8   2. Atrial fibrillation, unspecified type (CMS/HCC) I48.91 427.31   3. Decreased mobility R26.89 781.99     Patient Active Problem List   Diagnosis   • Upper GI bleed   • Diastolic dysfunction   • GERD (gastroesophageal reflux disease)   • Hypertension   • Parkinsons (CMS/HCC)   • Rheumatoid arthritis (CMS/HCC)   • Anemia, posthemorrhagic, acute   • Gastroesophageal reflux disease   • Urinary tract infection, site not specified   • Rheumatoid arthritis (CMS/HCC)   • Hypokalemia   • Atrial fibrillation (CMS/HCC)   • Abdominal pain   • Anxiety   • Hypomagnesemia       Therapy Treatment    Rehabilitation Treatment Summary     Row Name 02/15/19 1132             Treatment Time/Intention    Discipline  physical therapy assistant  -      Document Type  therapy note (daily note)  -      Subjective Information  complains of;pain  -SM      Mode of Treatment  physical therapy  -SM      Patient Effort  adequate  -SM      Existing Precautions/Restrictions  fall  -SM      Recorded by [] Anali Verdugo, [facility] 02/15/19 1158      Row Name 02/15/19 1132             Cognitive Assessment/Intervention    Additional Documentation  Cognitive Assessment/Intervention (Group)  -SM      Recorded by [] Anali Verdugo PTA 02/15/19 1158      Row Name 02/15/19 1132             Cognitive Assessment/Intervention- PT/OT    Orientation Status (Cognition)  oriented x 3  -SM      Follows Commands (Cognition)  follows one step commands  -SM      Personal Safety Interventions  fall prevention program maintained;gait belt;nonskid shoes/slippers when out of bed  -SM      Recorded by [] Anali Verdugo [facility] 02/15/19 1158      Row Name  02/15/19 1132             Bed Mobility Assessment/Treatment    Bed Mobility Assessment/Treatment  supine-sit;sit-supine  -SM      Supine-Sit [place] (Bed Mobility)  supervision  -SM      Sit-Supine [place] (Bed Mobility)  minimum assist (75% patient effort)  -SM      Bed Mobility, Safety Issues  decreased use of legs for bridging/pushing  -SM      Assistive Device (Bed Mobility)  bed rails;head of bed elevated  -SM      Comment (Bed Mobility)  via log roll  -SM      Recorded by [SM] Anali Verdugo, PTA 02/15/19 1158      Row Name 02/15/19 1132             Transfer Assessment/Treatment    Transfer Assessment/Treatment  sit-stand transfer;stand-sit transfer  -SM      Recorded by [SM] Anali Verdugo PTA 02/15/19 1158      Row Name 02/15/19 1132             Sit-Stand Transfer    Sit-Stand [place] (Transfers)  contact guard  -      Assistive Device (Sit-Stand Transfers)  walker, front-wheeled  -SM      Recorded by [SM] Anali Verdugo PTA 02/15/19 1158      Row Name 02/15/19 1132             Stand-Sit Transfer    Stand-Sit [place] (Transfers)  contact guard  -      Assistive Device (Stand-Sit Transfers)  walker, front-wheeled  -SM      Recorded by [SM] Anali Verdugo, BRIANNA 02/15/19 1158      Row Name 02/15/19 1132             Gait/Stairs Assessment/Training    [place] Level (Gait)  contact guard  -      Assistive Device (Gait)  walker, front-wheeled  -SM      Distance in Feet (Gait)  12  -SM      Pattern (Gait)  step-through;step-to  -SM      Deviations/Abnormal Patterns (Gait)  marija decreased;festinating/shuffling;stride length decreased  -SM      Bilateral Gait Deviations  forward flexed posture  -SM      Comment (Gait/Stairs)  fairly upright for first half, then leaned forearms onto walker  -SM      Recorded by [SM] Anali Verdugo, BRIANNA 02/15/19 1158      Row Name 02/15/19 1132             Positioning and Restraints    Pre-Treatment Position  in bed  -SM       Post Treatment Position  bed  -SM      In Bed  supine;call light within reach;encouraged to call for assist  -SM      Recorded by [] Anali Verdugo, [facility] 02/15/19 1158      Row Name 02/15/19 1132             Pain Assessment    Additional Documentation  Pain Scale: Numbers Pre/Post-Treatment (Group)  -SM      Recorded by [] Anali Verdugo, [facility] 02/15/19 1158      Row Name 02/15/19 1132             Pain Scale: Numbers Pre/Post-Treatment    Pain Scale: Numbers, Pretreatment  4/10  -SM      Pain Scale: Numbers, Post-Treatment  8/10  -SM      Pain Location - Orientation  lower  -SM      Pain Location  back  -SM      Pain Intervention(s)  Repositioned;Ambulation/increased activity;Rest  -SM      Recorded by [] Anali Verdugo [facility] 02/15/19 1158        User Key  (r) = Recorded By, (t) = Taken By, (c) = Cosigned By    Initials Name Effective Dates Discipline     Anali Verdugo [facility] 03/07/18 -  PT                   Physical Therapy Education     Title: PT OT SLP Therapies (Done)     Topic: Physical Therapy (Done)     Point: Mobility training (Done)     Learning Progress Summary           Patient Acceptance, E,D, VU,NR by  at 2/15/2019 11:58 AM    Acceptance, E, NR by  at 2/13/2019  3:23 PM                   Point: Home exercise program (Done)     Learning Progress Summary           Patient Acceptance, E,D, VU,NR by  at 2/15/2019 11:58 AM                   Point: Body mechanics (Done)     Learning Progress Summary           Patient Acceptance, E,D, VU,NR by  at 2/15/2019 11:58 AM                   Point: Precautions (Done)     Learning Progress Summary           Patient Acceptance, E,D, VU,NR by  at 2/15/2019 11:58 AM                               User Key     Initials Effective Dates Name Provider Type Discipline     04/03/18 -  Clover Mason PT Physical Therapist PT     03/07/18 -  Anali Verdugo PTA Physical Therapy Assistant PT                PT Recommendation and  Plan     Plan of Care Reviewed With: patient  Progress: no change  Outcome Summary: Pt tolerated treatment fair this date. Continues to have increased LBP w/ mobility. Required min A to get back into bed to assist w/ LEs. CGA to ambulate 12ft w/ Rw. Initially began fairly upright, though detention, pt leaned forearms onto walker d/t pain. PT will continue to address functional mobility deficits as tolerated.  Outcome Measures     Row Name 02/15/19 1200 02/13/19 1500          How much help from another person do you currently need...    Turning from your back to your side while in flat bed without using bedrails?  4  -SM  4  -EM     Moving from lying on back to sitting on the side of a flat bed without bedrails?  3  -SM  3  -EM     Moving to and from a bed to a chair (including a wheelchair)?  3  -SM  3  -EM     Standing up from a chair using your arms (e.g., wheelchair, bedside chair)?  3  -SM  3  -EM     Climbing 3-5 steps with a railing?  2  -SM  3  -EM     To walk in hospital room?  3  -SM  3  -EM     AM-PAC 6 Clicks Score  18  -SM  19  -EM        Functional Assessment    Outcome Measure Options  AM-PAC 6 Clicks Basic Mobility (PT)  -  --       User Key  (r) = Recorded By, (t) = Taken By, (c) = Cosigned By    Initials Name Provider Type    Clover Valencia, PT Physical Therapist    Anali Queen PTA Physical Therapy Assistant         Time Calculation:   PT Charges     Row Name 02/15/19 1204             Time Calculation    Start Time  1132  -      Stop Time  1145  -      Time Calculation (min)  13 min  -      PT Received On  02/15/19  -      PT - Next Appointment  02/16/19  -        User Key  (r) = Recorded By, (t) = Taken By, (c) = Cosigned By    Initials Name Provider Type    Anali Queen PTA Physical Therapy Assistant        Therapy Suggested Charges     Code   Minutes Charges    None           Therapy Charges for Today     Code Description Service Date Service Provider  Modifiers Qty    39265087998 HC PT THER PROC EA 15 MIN 2/15/2019 Anali Verdugo, PTA GP 1          PT G-Codes  Outcome Measure Options: AM-PAC 6 Clicks Basic Mobility (PT)  AM-PAC 6 Clicks Score: 18    Anali Verdugo, PTA  2/15/2019

## 2019-02-15 NOTE — PLAN OF CARE
Problem: Patient Care Overview  Goal: Plan of Care Review  Outcome: Ongoing (interventions implemented as appropriate)   02/15/19 5368   Coping/Psychosocial   Plan of Care Reviewed With patient   Plan of Care Review   Progress no change   OTHER   Outcome Summary Ambulates to bathroom with assistance to void. Pain medications offered for back pain. VSS. Rested well. No distress noted.       Problem: Fall Risk (Adult)  Goal: Absence of Fall  Outcome: Ongoing (interventions implemented as appropriate)      Problem: Skin Injury Risk (Adult)  Goal: Skin Health and Integrity  Outcome: Ongoing (interventions implemented as appropriate)      Problem: Arrhythmia/Dysrhythmia (Symptomatic) (Adult)  Goal: Signs and Symptoms of Listed Potential Problems Will be Absent, Minimized or Managed (Arrhythmia/Dysrhythmia)  Outcome: Ongoing (interventions implemented as appropriate)

## 2019-02-15 NOTE — PROGRESS NOTES
Name: Trang Liu ADMIT: 2019   : 1935  PCP: Nayla Higginbotham APRN    MRN: [id number] LOS: 0 days   AGE/SEX: 83 y.o. female  ROOM: [facility]   Subjective   No CP SOA NVD.    Objective   Vital Signs  Temp:  [97.9 °F (36.6 °C)-98.4 °F (36.9 °C)] 98.2 °F (36.8 °C)  Heart Rate:  [71-81] 71  Resp:  [16] 16  BP: (106-146)/(52-71) 146/70  SpO2:  [91 %-96 %] 91 %  on   ;   Device (Oxygen Therapy): room air  Body mass index is 21.93 kg/m².    Physical Exam   Constitutional: She appears well-developed. No distress.   frail   HENT:   Head: Atraumatic.   Nose: Nose normal.   Eyes: Conjunctivae and EOM are normal.   Neck: Normal range of motion. Neck supple.   Cardiovascular: Normal rate, regular rhythm and intact distal pulses.   Pulmonary/Chest: Effort normal. She has decreased breath sounds. She has no wheezes.   Abdominal: Soft. She exhibits no distension. There is no guarding.   Musculoskeletal: She exhibits no edema or tenderness.   Neurological: She is alert. No cranial nerve deficit.   Skin: Skin is warm and dry. She is not diaphoretic.   Psychiatric: She has a normal mood and affect. Her behavior is normal.   Nursing note and vitals reviewed.      Results Review:       I reviewed the patient's new clinical results.     I reviewed telemetry/EKG results, sinus.    Results from last 7 days   Lab Units 02/15/19  0527 19  0612   WBC 10*3/mm3 6.46 8.45   HEMOGLOBIN g/dL 12.8 13.8   PLATELETS 10*3/mm3 197 188     Results from last 7 days   Lab Units 02/15/19  0527 19  0306 19  0546 19  1525   SODIUM mmol/L 138 140 143 139   POTASSIUM mmol/L 3.2* 3.3* 2.8* 2.7*   CHLORIDE mmol/L 95* 96* 98 96*   CO2 mmol/L 31.9* 31.2* 32.2* 30.4*   BUN mg/dL 9 10 7* 11   CREATININE mg/dL 0.70 0.67 0.71 0.64   GLUCOSE mg/dL 84 98 93 108*   Estimated Creatinine Clearance: 54.3 mL/min (by C-G formula based on SCr of 0.7 mg/dL).  Results from last 7 days   Lab Units 02/15/19  0527 19  0301  02/13/19  0546 02/12/19  1525 02/12/19  0612   CALCIUM mg/dL 9.3 9.3 9.5 9.1 10.0   ALBUMIN g/dL  --   --   --   --  4.10   MAGNESIUM mg/dL 1.5*  --   --   --  1.7         amLODIPine 10 mg Oral Daily   budesonide-formoterol 2 puff Inhalation BID - RT   carbidopa-levodopa 2 tablet Oral 4x Daily   cholecalciferol 1,000 Units Oral Daily   fluticasone 2 spray Nasal Daily   furosemide 40 mg Oral Daily   pantoprazole 40 mg Oral Daily   PARoxetine 20 mg Oral QAM   potassium chloride 40 mEq Oral Daily   sodium chloride 3 mL Intravenous Q12H   sucralfate 1 g Oral 4x Daily   vitamin B-12 1,000 mcg Oral Daily      Diet Regular; Cardiac      Assessment/Plan      Active Hospital Problems    Diagnosis Date Noted   • **Hypokalemia [E87.6] 02/12/2019   • Atrial fibrillation (CMS/HCC) [I48.91] 02/12/2019   • Abdominal pain [R10.9] 02/12/2019   • Anxiety [F41.9] 02/12/2019      Resolved Hospital Problems   No resolved problems to display.     - Hypokalemia: Still low. Hypomagnesemia contributing. Will order replacements. Repeat labs in am.  - PACs v AFib: Has grade 2 diastolic dysfunction on echo. Not acutely volume overloaded on exam. Is on lasix chronically. Cardiology following.  - Abdominal Pain: Workup negative. No pain today.  - Noncompliance: Access following.  - Fall/Parkinsons/Weakness: PT/OT.  - Disposition: SNF/possibly tomorrow    Matthew Morgan MD  [facility]  02/15/19  8:20 AM

## 2019-02-15 NOTE — PROGRESS NOTES
Continued Stay Note  [facility]     Patient Name: Trang Liu  MRN: [id number]  Today's Date: 2/15/2019    Admit Date: 2/12/2019    Discharge Plan     Row Name 02/15/19 1700       Plan    Plan  [facility] accepted pending pre-cert     Plan Comments  Pre-cert still pending. Left message for Anushka/[facility], she will call the floor if pre-cert obtained. CCP to follow. JCkenyastumuRN/CCP      No documentation.             Andra Raza RN

## 2019-02-15 NOTE — PROGRESS NOTES
Continued Stay Note  [facility]     Patient Name: Trang Liu  MRN: [id number]  Today's Date: 2/15/2019    Admit Date: 2/12/2019    Discharge Plan     Row Name 02/15/19 1239       Plan    Plan  [facility], referral pending     Plan Comments  Called and spoke with Indigo/Sonia, referral still pending, she will review this pt next. CCP to follow. JChasteenRN/CCP         Discharge Codes    No documentation.             Andra Raza RN

## 2019-02-15 NOTE — PROGRESS NOTES
Continued Stay Note  [facility]     Patient Name: Trang Liu  MRN: [id number]  Today's Date: 2/15/2019    Admit Date: 2/12/2019    Discharge Plan     Row Name 02/15/19 1625       Plan    Plan  [facility], accepted pending pre-cert     Plan Comments  Message recieved from [facility], [facility] has accepted pending pre-cert. Pre-cert initiated. Pt  updated at bedside. SheliaN updated. CCP to follow. JChastumuRN/CCP         Discharge Codes    No documentation.             Andra Raza RN

## 2019-02-15 NOTE — PROGRESS NOTES
Access Ctr Note.    Pt reporting back pain today (level 10) that interfered with her sleep last night. She said she feels like she hasn't really slept since around 2:00am. Pt reported higher anxiety as a result of her back pain. Pt reported no depressed feelings.   Pt was potentially getting discharged to [facility] today but said that might be changing due to her current pain.   Pt and daughter have not reviewed outpatient therapy resource list yet. This writer reminded Pt the list is sitting on top of the bedside table.     Access will continue to follow.

## 2019-02-15 NOTE — PLAN OF CARE
Problem: Patient Care Overview  Goal: Plan of Care Review  Outcome: Ongoing (interventions implemented as appropriate)   02/15/19 3629   Coping/Psychosocial   Plan of Care Reviewed With patient   OTHER   Outcome Summary Pt presents with decreased independence for adl tasks. Pt reports some difficulty with dressing task PTA. May benefit from AE teaching to assist and functional standing balance tasks. Will continue to benefit from OT

## 2019-02-15 NOTE — PLAN OF CARE
Problem: Patient Care Overview  Goal: Plan of Care Review  Outcome: Ongoing (interventions implemented as appropriate)   02/15/19 1612   Coping/Psychosocial   Plan of Care Reviewed With patient   Plan of Care Review   Progress improving   OTHER   Outcome Summary K+ replaced. VSS on RA. NSR on the monitor. Medicated for pain with PRN meds. Possible d/c over the weekend to rehab. No acute s/s of distress. Will continue to monitor.

## 2019-02-16 LAB
ANION GAP SERPL CALCULATED.3IONS-SCNC: 11.5 MMOL/L
BUN BLD-MCNC: 10 MG/DL (ref 8–23)
BUN/CREAT SERPL: 14.3 (ref 7–25)
CALCIUM SPEC-SCNC: 9.3 MG/DL (ref 8.6–10.5)
CHLORIDE SERPL-SCNC: 96 MMOL/L (ref 98–107)
CO2 SERPL-SCNC: 31.5 MMOL/L (ref 22–29)
CREAT BLD-MCNC: 0.7 MG/DL (ref 0.57–1)
GFR SERPL CREATININE-BSD FRML MDRD: 80 ML/MIN/1.73
GLUCOSE BLD-MCNC: 92 MG/DL (ref 65–99)
GLUCOSE BLDC GLUCOMTR-MCNC: 139 MG/DL (ref 70–130)
MAGNESIUM SERPL-MCNC: 1.7 MG/DL (ref 1.6–2.4)
POTASSIUM BLD-SCNC: 4.2 MMOL/L (ref 3.5–5.2)
SODIUM BLD-SCNC: 139 MMOL/L (ref 136–145)

## 2019-02-16 PROCEDURE — 80048 BASIC METABOLIC PNL TOTAL CA: CPT | Performed by: INTERNAL MEDICINE

## 2019-02-16 PROCEDURE — 94799 UNLISTED PULMONARY SVC/PX: CPT

## 2019-02-16 PROCEDURE — 97110 THERAPEUTIC EXERCISES: CPT | Performed by: PHYSICAL THERAPIST

## 2019-02-16 PROCEDURE — 82962 GLUCOSE BLOOD TEST: CPT

## 2019-02-16 PROCEDURE — 83735 ASSAY OF MAGNESIUM: CPT | Performed by: INTERNAL MEDICINE

## 2019-02-16 RX ADMIN — SODIUM CHLORIDE, PRESERVATIVE FREE 3 ML: 5 INJECTION INTRAVENOUS at 17:30

## 2019-02-16 RX ADMIN — VITAMIN D, TAB 1000IU (100/BT) 1000 UNITS: 25 TAB at 08:24

## 2019-02-16 RX ADMIN — PANTOPRAZOLE SODIUM 40 MG: 40 TABLET, DELAYED RELEASE ORAL at 08:24

## 2019-02-16 RX ADMIN — PAROXETINE HYDROCHLORIDE 20 MG: 20 TABLET, FILM COATED ORAL at 08:24

## 2019-02-16 RX ADMIN — HYDROCODONE BITARTRATE AND ACETAMINOPHEN 1 TABLET: 5; 325 TABLET ORAL at 08:23

## 2019-02-16 RX ADMIN — AMLODIPINE BESYLATE 10 MG: 10 TABLET ORAL at 08:24

## 2019-02-16 RX ADMIN — CARBIDOPA AND LEVODOPA 2 TABLET: 25; 100 TABLET ORAL at 20:30

## 2019-02-16 RX ADMIN — FLUTICASONE PROPIONATE 2 SPRAY: 50 SPRAY, METERED NASAL at 08:24

## 2019-02-16 RX ADMIN — SODIUM CHLORIDE, PRESERVATIVE FREE 3 ML: 5 INJECTION INTRAVENOUS at 08:25

## 2019-02-16 RX ADMIN — Medication 1000 MCG: at 08:24

## 2019-02-16 RX ADMIN — CARBIDOPA AND LEVODOPA 2 TABLET: 25; 100 TABLET ORAL at 12:02

## 2019-02-16 RX ADMIN — BUDESONIDE AND FORMOTEROL FUMARATE DIHYDRATE 2 PUFF: 160; 4.5 AEROSOL RESPIRATORY (INHALATION) at 08:16

## 2019-02-16 RX ADMIN — HYDROCODONE BITARTRATE AND ACETAMINOPHEN 1 TABLET: 5; 325 TABLET ORAL at 15:04

## 2019-02-16 RX ADMIN — FUROSEMIDE 40 MG: 40 TABLET ORAL at 08:24

## 2019-02-16 RX ADMIN — BUDESONIDE AND FORMOTEROL FUMARATE DIHYDRATE 2 PUFF: 160; 4.5 AEROSOL RESPIRATORY (INHALATION) at 19:10

## 2019-02-16 RX ADMIN — POLYETHYLENE GLYCOL 3350 17 G: 17 POWDER, FOR SOLUTION ORAL at 08:25

## 2019-02-16 RX ADMIN — CARBIDOPA AND LEVODOPA 2 TABLET: 25; 100 TABLET ORAL at 17:32

## 2019-02-16 RX ADMIN — CARBIDOPA AND LEVODOPA 2 TABLET: 25; 100 TABLET ORAL at 08:24

## 2019-02-16 RX ADMIN — POTASSIUM CHLORIDE 40 MEQ: 750 CAPSULE, EXTENDED RELEASE ORAL at 08:24

## 2019-02-16 NOTE — THERAPY TREATMENT NOTE
Acute Care - Physical Therapy Treatment Note  [facility]     Patient Name: Trang Liu  : 1935  MRN: [id number]  Today's Date: 2019  Onset of Illness/Injury or Date of Surgery: 19          Admit Date: 2019    Visit Dx:    ICD-10-CM ICD-9-CM   1. Hypokalemia E87.6 276.8   2. Atrial fibrillation, unspecified type (CMS/HCC) I48.91 427.31   3. Decreased mobility R26.89 781.99     Patient Active Problem List   Diagnosis   • Upper GI bleed   • Diastolic dysfunction   • GERD (gastroesophageal reflux disease)   • Hypertension   • Parkinsons (CMS/HCC)   • Rheumatoid arthritis (CMS/HCC)   • Anemia, posthemorrhagic, acute   • Gastroesophageal reflux disease   • Urinary tract infection, site not specified   • Rheumatoid arthritis (CMS/HCC)   • Hypokalemia   • Atrial fibrillation (CMS/HCC)   • Abdominal pain   • Anxiety   • Hypomagnesemia       Therapy Treatment    Rehabilitation Treatment Summary     Row Name 19 170             Treatment Time/Intention    Discipline  physical therapist  -MP      Document Type  therapy note (daily note)  -MP      Mode of Treatment  physical therapy  -MP      Care Plan Review  care plan/treatment goals reviewed  -MP      Total Minutes, Physical Therapy Treatment  30  -MP      Therapy Frequency (PT Clinical Impression)  daily  -MP      Patient Effort  good  -MP      Patient Response to Treatment  Back pain initially went up after sit to supine transfer at the end of the sessin but calmed down as she rested.  -MP      Recorded by [MP] Daniel Solano, PT 19 1725      Row Name 19 170             Cognitive Assessment Intervention- SLP    Cognitive Function (Cognition)  WNL  -MP      Orientation Status (Cognition)  WNL  -MP      Recorded by [MP] Daniel Solano, PT 19 1725      Row Name 19 1700             Bed Mobility Assessment/Treatment    Bed Mobility Assessment/Treatment  supine-sit;sit-supine  -MP      Supine-Sit [place] (Bed  Mobility)  minimum assist (75% patient effort)  -MP      Sit-Supine [place] (Bed Mobility)  moderate assist (50% patient effort)  -MP      Bed Mobility, Safety Issues  impaired trunk control for bed mobility LBP  -MP      Assistive Device (Bed Mobility)  bed rails;draw sheet  -MP      Recorded by [MP] Daniel Solano, PT 02/16/19 1725      Row Name 02/16/19 1700             Transfer Assessment/Treatment    Maintains Weight-bearing Status (Transfers)  able to maintain  -MP      Recorded by [MP] Daniel Solano, PT 02/16/19 1725      Row Name 02/16/19 1700             Sit-Stand Transfer    Sit-Stand [place] (Transfers)  minimum assist (75% patient effort)  -MP      Assistive Device (Sit-Stand Transfers)  walker, front-wheeled  -MP      Recorded by [MP] Daniel Solano, PT 02/16/19 1725      Row Name 02/16/19 1700             Stand-Sit Transfer    Stand-Sit [place] (Transfers)  minimum assist (75% patient effort)  -MP      Assistive Device (Stand-Sit Transfers)  walker, front-wheeled  -MP      Recorded by [MP] Daniel Solano, PT 02/16/19 1725      Row Name 02/16/19 1700             Gait/Stairs Assessment/Training    45963 - Gait Training Minutes   15  -MP      Gait/Stairs Assessment/Training  gait/ambulation assistive device;gait pattern  -MP      [place] Level (Gait)  minimum assist (75% patient effort)  -MP      Assistive Device (Gait)  walker, front-wheeled  -MP      Distance in Feet (Gait)  20  -MP      Pattern (Gait)  other (see comments)  -MP      Comment (Gait/Stairs)  Ambulated with step through gait, but stride length was diminished.  She was kyphotic with ambulation.  -MP      Recorded by [MP] Daniel Solano, PT 02/16/19 1725      Row Name 02/16/19 1700             Positioning and Restraints    Pre-Treatment Position  in bed  -MP      Post Treatment Position  bed  -MP      In Bed  supine;call light within reach;encouraged to call for assist;exit alarm on  -MP      Recorded by [MP] Daniel Solano,  PT 02/16/19 1725      Row Name 02/16/19 1700             Outcome Summary/Treatment Plan (PT)    Daily Summary of Progress (PT)  progress towards functional goals is fair  -MP      Barriers to Overall Progress (PT)  Pain and weakness  -MP      Anticipated Discharge Disposition (PT)  inpatient rehabilitation facility  -MP      Recorded by [MP] Daniel Solano, PT 02/16/19 1725        User Key  (r) = Recorded By, (t) = Taken By, (c) = Cosigned By    Initials Name Effective Dates Discipline    MP Daniel Solano, PT 06/22/16 -  PT                   Physical Therapy Education     Title: PT OT SLP Therapies (In Progress)     Topic: Physical Therapy (Done)     Point: Mobility training (Done)     Learning Progress Summary           Patient Eager, E, VU by  at 2/16/2019  5:25 PM    Acceptance, E,D, VU,NR by  at 2/15/2019 11:58 AM    Acceptance, E, NR by  at 2/13/2019  3:23 PM                   Point: Home exercise program (Done)     Learning Progress Summary           Patient Eager, E, VU by  at 2/16/2019  5:25 PM    Acceptance, E,D, VU,NR by  at 2/15/2019 11:58 AM                   Point: Body mechanics (Done)     Learning Progress Summary           Patient Eager, E, VU by  at 2/16/2019  5:25 PM    Acceptance, E,D, VU,NR by  at 2/15/2019 11:58 AM                   Point: Precautions (Done)     Learning Progress Summary           Patient Eager, E, VU by  at 2/16/2019  5:25 PM    Acceptance, E,D, VU,NR by  at 2/15/2019 11:58 AM                               User Key     Initials Effective Dates Name Provider Type Discipline     04/03/18 -  Clover Mason, PT Physical Therapist PT     03/07/18 -  Anali Verdugo PTA Physical Therapy Assistant PT     06/22/16 -  Daniel Solano, PT Physical Therapist PT                PT Recommendation and Plan  Anticipated Discharge Disposition (PT): inpatient rehabilitation facility  Therapy Frequency (PT Clinical Impression): daily  Outcome Summary/Treatment Plan  (PT)  Daily Summary of Progress (PT): progress towards functional goals is fair  Barriers to Overall Progress (PT): Pain and weakness  Anticipated Discharge Disposition (PT): inpatient rehabilitation facility  Plan of Care Reviewed With: patient  Progress: improving  Outcome Summary: She ambulated 20 feet, somewhat labored, but worked through the pain and weakness.  Used a front wheeled walker, minimal assist of one.  Outcome Measures     Row Name 02/16/19 1700 02/15/19 1255 02/15/19 1200       How much help from another person do you currently need...    Turning from your back to your side while in flat bed without using bedrails?  4  -MP  --  4  -SM    Moving from lying on back to sitting on the side of a flat bed without bedrails?  3  -MP  --  3  -SM    Moving to and from a bed to a chair (including a wheelchair)?  3  -MP  --  3  -SM    Standing up from a chair using your arms (e.g., wheelchair, bedside chair)?  3  -MP  --  3  -SM    Climbing 3-5 steps with a railing?  2  -MP  --  2  -SM    To walk in hospital room?  3  -MP  --  3  -SM    AM-PAC 6 Clicks Score  18  -MP  --  18  -SM       How much help from another is currently needed...    Putting on and taking off regular lower body clothing?  --  2  -SG  --    Bathing (including washing, rinsing, and drying)  --  2  -SG  --    Toileting (which includes using toilet bed pan or urinal)  --  3  -SG  --    Putting on and taking off regular upper body clothing  --  3  -SG  --    Taking care of personal grooming (such as brushing teeth)  --  3  -SG  --    Eating meals  --  3  -SG  --    Score  --  16  -SG  --       Functional Assessment    Outcome Measure Options  AM-PAC 6 Clicks Basic Mobility (PT)  -MP  AM-PAC 6 Clicks Daily Activity (OT)  -SG  AM-PAC 6 Clicks Basic Mobility (PT)  -      User Key  (r) = Recorded By, (t) = Taken By, (c) = Cosigned By    Initials Name Provider Type    Tammi Cook, OTR Occupational Therapist    Anali Queen, PTA  Physical Therapy Assistant    Daniel Santa PT Physical Therapist         Time Calculation:   PT Charges     Row Name 02/16/19 1727 02/16/19 1700          Time Calculation    Start Time  1655  -MP  --     Stop Time  1725  -MP  --     Time Calculation (min)  30 min  -MP  --     PT Received On  02/16/19  -MP  --     PT - Next Appointment  02/17/19  -MP  --        Timed Charges    95563 - Gait Training Minutes   --  15  -MP       User Key  (r) = Recorded By, (t) = Taken By, (c) = Cosigned By    Initials Name Provider Type    Daniel Santa PT Physical Therapist        Therapy Suggested Charges     Code   Minutes Charges    71129 (CPT®) Hc Pt Neuromusc Re Education Ea 15 Min      77620 (CPT®) Hc Pt Ther Proc Ea 15 Min      40078 (CPT®) Hc Gait Training Ea 15 Min 15 1    68936 (CPT®) Hc Pt Therapeutic Act Ea 15 Min      95512 (CPT®) Hc Pt Manual Therapy Ea 15 Min      92408 (CPT®) Hc Pt Iontophoresis Ea 15 Min      79322 (CPT®) Hc Pt Elec Stim Ea-Per 15 Min      96095 (CPT®) Hc Pt Ultrasound Ea 15 Min      85050 (CPT®) Hc Pt Self Care/Mgmt/Train Ea 15 Min      59338 (CPT®) Hc Pt Prosthetic (S) Train Initial Encounter, Each 15 Min      84045 (CPT®) Hc Pt Orthotic(S)/Prosthetic(S) Encounter, Each 15 Min      18754 (CPT®) Hc Orthotic(S) Mgmt/Train Initial Encounter, Each 15min      Total  15 1        Therapy Charges for Today     Code Description Service Date Service Provider Modifiers Qty    76102666978 HC PT THER PROC EA 15 MIN 2/16/2019 Daniel Solano PT GP 1          PT G-Codes  Outcome Measure Options: AM-PAC 6 Clicks Basic Mobility (PT)  AM-PAC 6 Clicks Score: 18  Score: 16    aDniel Solano PT  2/16/2019

## 2019-02-16 NOTE — PLAN OF CARE
Problem: Patient Care Overview  Goal: Plan of Care Review  Outcome: Ongoing (interventions implemented as appropriate)   02/16/19 0413   Coping/Psychosocial   Plan of Care Reviewed With patient   Plan of Care Review   Progress improving   OTHER   Outcome Summary Possible DC soon. C/O right hip/back pain from fall at home prior to admit. Repeat K+ level in AM.       Problem: Fall Risk (Adult)  Goal: Absence of Fall  Outcome: Ongoing (interventions implemented as appropriate)      Problem: Skin Injury Risk (Adult)  Goal: Skin Health and Integrity  Outcome: Ongoing (interventions implemented as appropriate)      Problem: Arrhythmia/Dysrhythmia (Symptomatic) (Adult)  Goal: Signs and Symptoms of Listed Potential Problems Will be Absent, Minimized or Managed (Arrhythmia/Dysrhythmia)  Outcome: Ongoing (interventions implemented as appropriate)

## 2019-02-16 NOTE — PROGRESS NOTES
Reviewed chart and met w/ patient. She is lying in bed. Did not appear to be in any distress. States she is in pain and needs a pain med. She pointed to the list of outpatient mental health providers when ask about it.  She states they are considering her going to [facility] for rehab.

## 2019-02-16 NOTE — PROGRESS NOTES
Name: Trang Liu ADMIT: 2019   : 1935  PCP: Nayla Higginbotham APRN    MRN: [id number] LOS: 1 days   AGE/SEX: 83 y.o. female  ROOM: [facility]   Subjective   Bilateral hip/back pain stable. Discussed PT trial and then consideration of MRI if no improvement. She said she would not want back surgery so utility of MRI would be if alarm symptoms develop. Currently no incontinence or saddle paresthesia reported. No CP SOA NVD. Was on 2L this morning but saturating 96%. Turned down to 1L.    Objective   Vital Signs  Temp:  [97.5 °F (36.4 °C)-98.6 °F (37 °C)] 98.2 °F (36.8 °C)  Heart Rate:  [69-85] 70  Resp:  [16-18] 16  BP: (107-141)/(52-77) 107/59  SpO2:  [92 %-96 %] 92 %  on   ;   Device (Oxygen Therapy): room air  Body mass index is 21.93 kg/m².    Physical Exam   Constitutional: She appears well-developed. No distress.   frail   HENT:   Head: Atraumatic.   Nose: Nose normal.   Eyes: Conjunctivae and EOM are normal.   Neck: Normal range of motion. Neck supple.   Cardiovascular: Normal rate, regular rhythm and intact distal pulses.   Pulmonary/Chest: Effort normal. She has decreased breath sounds. She has no wheezes.   Abdominal: Soft. She exhibits no distension. There is no guarding.   Musculoskeletal: She exhibits no edema or tenderness.   Neurological: She is alert. No cranial nerve deficit.   Skin: Skin is warm and dry. She is not diaphoretic.   Psychiatric: She has a normal mood and affect. Her behavior is normal.   Nursing note and vitals reviewed.      Results Review:       I reviewed the patient's new clinical results.      Results from last 7 days   Lab Units 02/15/19  0527 19  0612   WBC 10*3/mm3 6.46 8.45   HEMOGLOBIN g/dL 12.8 13.8   PLATELETS 10*3/mm3 197 188     Results from last 7 days   Lab Units 19  0413 02/15/19  0527 19  0306 19  0546   SODIUM mmol/L 139 138 140 143   POTASSIUM mmol/L 4.2 3.2* 3.3* 2.8*   CHLORIDE mmol/L 96* 95* 96* 98   CO2 mmol/L 31.5*  31.9* 31.2* 32.2*   BUN mg/dL 10 9 10 7*   CREATININE mg/dL 0.70 0.70 0.67 0.71   GLUCOSE mg/dL 92 84 98 93   Estimated Creatinine Clearance: 54.3 mL/min (by C-G formula based on SCr of 0.7 mg/dL).  Results from last 7 days   Lab Units 02/16/19  0413 02/15/19  0527 02/14/19  0306 02/13/19  0546  02/12/19  0612   CALCIUM mg/dL 9.3 9.3 9.3 9.5   < > 10.0   ALBUMIN g/dL  --   --   --   --   --  4.10   MAGNESIUM mg/dL 1.7 1.5*  --   --   --  1.7    < > = values in this interval not displayed.         amLODIPine 10 mg Oral Daily   budesonide-formoterol 2 puff Inhalation BID - RT   carbidopa-levodopa 2 tablet Oral 4x Daily   cholecalciferol 1,000 Units Oral Daily   fluticasone 2 spray Nasal Daily   furosemide 40 mg Oral Daily   pantoprazole 40 mg Oral Daily   PARoxetine 20 mg Oral QAM   polyethylene glycol 17 g Oral Daily   potassium chloride 40 mEq Oral Daily   sodium chloride 3 mL Intravenous Q12H   sucralfate 1 g Oral 4x Daily   vitamin B-12 1,000 mcg Oral Daily      Diet Regular; Cardiac      Assessment/Plan      Active Hospital Problems    Diagnosis Date Noted   • **Hypokalemia [E87.6] 02/12/2019   • Hypomagnesemia [E83.42] 02/15/2019   • Atrial fibrillation (CMS/HCC) [I48.91] 02/12/2019   • Abdominal pain [R10.9] 02/12/2019   • Anxiety [F41.9] 02/12/2019      Resolved Hospital Problems   No resolved problems to display.     - Hypokalemia: Mag ok this morning after replacement. Potassium level finally back to normal range. Continue daily supplements with ongoing lasix.  - PACs v AFib: Has grade 2 diastolic dysfunction on echo. Not acutely volume overloaded on exam. Is on lasix chronically. Cardiology following.  - Abdominal Pain: Workup negative. No pain today.  - Noncompliance: Access following.  - Fall/Parkinsons/Weakness: PT/OT.  - Disposition: SNF/pending precert    Matthew Morgan MD  [facility]  02/16/19  9:22 AM

## 2019-02-16 NOTE — PLAN OF CARE
Problem: Patient Care Overview  Goal: Plan of Care Review  Outcome: Ongoing (interventions implemented as appropriate)   02/16/19 2834   Coping/Psychosocial   Plan of Care Reviewed With patient   Plan of Care Review   Progress improving   OTHER   Outcome Summary pain meds as needed, denies nausea, tolerating meals, pt on RA, vitals stable will continue to monitor.

## 2019-02-16 NOTE — PLAN OF CARE
Problem: Patient Care Overview  Goal: Plan of Care Review   02/16/19 1204   Coping/Psychosocial   Plan of Care Reviewed With patient   Plan of Care Review   Progress improving   OTHER   Outcome Summary She ambulated 20 feet, somewhat labored, but worked through the pain and weakness. Used a front wheeled walker, minimal assist of one.

## 2019-02-17 LAB
ANION GAP SERPL CALCULATED.3IONS-SCNC: 12.4 MMOL/L
BUN BLD-MCNC: 12 MG/DL (ref 8–23)
BUN/CREAT SERPL: 16.7 (ref 7–25)
CALCIUM SPEC-SCNC: 9.6 MG/DL (ref 8.6–10.5)
CHLORIDE SERPL-SCNC: 96 MMOL/L (ref 98–107)
CO2 SERPL-SCNC: 30.6 MMOL/L (ref 22–29)
CREAT BLD-MCNC: 0.72 MG/DL (ref 0.57–1)
GFR SERPL CREATININE-BSD FRML MDRD: 78 ML/MIN/1.73
GLUCOSE BLD-MCNC: 98 MG/DL (ref 65–99)
POTASSIUM BLD-SCNC: 3.7 MMOL/L (ref 3.5–5.2)
SODIUM BLD-SCNC: 139 MMOL/L (ref 136–145)

## 2019-02-17 PROCEDURE — 94799 UNLISTED PULMONARY SVC/PX: CPT

## 2019-02-17 PROCEDURE — 25010000002 ONDANSETRON PER 1 MG: Performed by: HOSPITALIST

## 2019-02-17 PROCEDURE — 97110 THERAPEUTIC EXERCISES: CPT | Performed by: PHYSICAL THERAPIST

## 2019-02-17 PROCEDURE — 97116 GAIT TRAINING THERAPY: CPT | Performed by: PHYSICAL THERAPIST

## 2019-02-17 PROCEDURE — 80048 BASIC METABOLIC PNL TOTAL CA: CPT | Performed by: INTERNAL MEDICINE

## 2019-02-17 RX ORDER — CHLORDIAZEPOXIDE HYDROCHLORIDE 5 MG/1
5 CAPSULE, GELATIN COATED ORAL 3 TIMES DAILY PRN
Qty: 9 CAPSULE | Refills: 0 | Status: SHIPPED | OUTPATIENT
Start: 2019-02-17 | End: 2022-12-29 | Stop reason: HOSPADM

## 2019-02-17 RX ORDER — POTASSIUM CHLORIDE 750 MG/1
40 TABLET, FILM COATED, EXTENDED RELEASE ORAL DAILY
Start: 2019-02-17 | End: 2022-12-29 | Stop reason: HOSPADM

## 2019-02-17 RX ORDER — SENNA AND DOCUSATE SODIUM 50; 8.6 MG/1; MG/1
2 TABLET, FILM COATED ORAL 2 TIMES DAILY PRN
Start: 2019-02-17

## 2019-02-17 RX ORDER — HYDROCODONE BITARTRATE AND ACETAMINOPHEN 5; 325 MG/1; MG/1
1-2 TABLET ORAL EVERY 6 HOURS PRN
Qty: 24 TABLET | Refills: 0 | Status: SHIPPED | OUTPATIENT
Start: 2019-02-17 | End: 2020-03-11 | Stop reason: SDUPTHER

## 2019-02-17 RX ADMIN — FLUTICASONE PROPIONATE 2 SPRAY: 50 SPRAY, METERED NASAL at 08:54

## 2019-02-17 RX ADMIN — CARBIDOPA AND LEVODOPA 2 TABLET: 25; 100 TABLET ORAL at 17:41

## 2019-02-17 RX ADMIN — SODIUM CHLORIDE, PRESERVATIVE FREE 3 ML: 5 INJECTION INTRAVENOUS at 21:01

## 2019-02-17 RX ADMIN — PANTOPRAZOLE SODIUM 40 MG: 40 TABLET, DELAYED RELEASE ORAL at 06:10

## 2019-02-17 RX ADMIN — ONDANSETRON HYDROCHLORIDE 4 MG: 2 SOLUTION INTRAMUSCULAR; INTRAVENOUS at 07:02

## 2019-02-17 RX ADMIN — CARBIDOPA AND LEVODOPA 2 TABLET: 25; 100 TABLET ORAL at 21:01

## 2019-02-17 RX ADMIN — HYDROCODONE BITARTRATE AND ACETAMINOPHEN 1 TABLET: 5; 325 TABLET ORAL at 08:53

## 2019-02-17 RX ADMIN — BUDESONIDE AND FORMOTEROL FUMARATE DIHYDRATE 2 PUFF: 160; 4.5 AEROSOL RESPIRATORY (INHALATION) at 21:37

## 2019-02-17 RX ADMIN — AMLODIPINE BESYLATE 10 MG: 10 TABLET ORAL at 08:52

## 2019-02-17 RX ADMIN — FUROSEMIDE 40 MG: 40 TABLET ORAL at 08:52

## 2019-02-17 RX ADMIN — POTASSIUM CHLORIDE 40 MEQ: 750 CAPSULE, EXTENDED RELEASE ORAL at 08:52

## 2019-02-17 RX ADMIN — SODIUM CHLORIDE, PRESERVATIVE FREE 3 ML: 5 INJECTION INTRAVENOUS at 08:53

## 2019-02-17 RX ADMIN — HYDROCODONE BITARTRATE AND ACETAMINOPHEN 1 TABLET: 5; 325 TABLET ORAL at 23:41

## 2019-02-17 RX ADMIN — Medication 1000 MCG: at 08:52

## 2019-02-17 RX ADMIN — HYDROCODONE BITARTRATE AND ACETAMINOPHEN 1 TABLET: 5; 325 TABLET ORAL at 17:41

## 2019-02-17 RX ADMIN — PAROXETINE HYDROCHLORIDE 20 MG: 20 TABLET, FILM COATED ORAL at 06:09

## 2019-02-17 RX ADMIN — POLYETHYLENE GLYCOL 3350 17 G: 17 POWDER, FOR SOLUTION ORAL at 08:53

## 2019-02-17 RX ADMIN — CARBIDOPA AND LEVODOPA 2 TABLET: 25; 100 TABLET ORAL at 08:52

## 2019-02-17 RX ADMIN — VITAMIN D, TAB 1000IU (100/BT) 1000 UNITS: 25 TAB at 08:52

## 2019-02-17 RX ADMIN — BUDESONIDE AND FORMOTEROL FUMARATE DIHYDRATE 2 PUFF: 160; 4.5 AEROSOL RESPIRATORY (INHALATION) at 07:42

## 2019-02-17 NOTE — THERAPY TREATMENT NOTE
Acute Care - Physical Therapy Treatment Note  [facility]     Patient Name: Trang Liu  : 1935  MRN: [id number]  Today's Date: 2019  Onset of Illness/Injury or Date of Surgery: 19          Admit Date: 2019    Visit Dx:    ICD-10-CM ICD-9-CM   1. Hypokalemia E87.6 276.8   2. Atrial fibrillation, unspecified type (CMS/HCC) I48.91 427.31   3. Decreased mobility R26.89 781.99     Patient Active Problem List   Diagnosis   • Upper GI bleed   • Diastolic dysfunction   • GERD (gastroesophageal reflux disease)   • Hypertension   • Parkinsons (CMS/HCC)   • Rheumatoid arthritis (CMS/HCC)   • Anemia, posthemorrhagic, acute   • Gastroesophageal reflux disease   • Urinary tract infection, site not specified   • Rheumatoid arthritis (CMS/HCC)   • Hypokalemia   • Atrial fibrillation (CMS/HCC)   • Abdominal pain   • Anxiety   • Hypomagnesemia       Therapy Treatment    Rehabilitation Treatment Summary     Row Name 19 1100             Treatment Time/Intention    Discipline  physical therapist  -MP      Document Type  therapy note (daily note)  -MP      Subjective Information  complains of;pain;nausea/vomiting  -MP      Mode of Treatment  physical therapy  -MP      Care Plan Review  care plan/treatment goals reviewed  -MP      Total Minutes, Physical Therapy Treatment  30  -MP      Therapy Frequency (PT Clinical Impression)  daily  -MP      Patient Effort  good  -MP      Patient Response to Treatment  Despite nausea and pain, she ambulated a little further today versus yesterday.  -MP      Recorded by [MP] Daniel Solano, PT 19 1132      Row Name 19 1100             Bed Mobility Assessment/Treatment    Bed Mobility Assessment/Treatment  bed mobility (all) activities  -MP      [place] Level (Bed Mobility)  moderate assist (50% patient effort)  -MP      Assistive Device (Bed Mobility)  bed rails;draw sheet  -MP      Recorded by [MP] Daniel Solano, PT 19 1132      Row Name  02/17/19 1100             Transfer Assessment/Treatment    Maintains Weight-bearing Status (Transfers)  able to maintain  -MP      Recorded by [MP] Daniel Solano, PT 02/17/19 1132      Row Name 02/17/19 1100             Sit-Stand Transfer    Sit-Stand [place] (Transfers)  moderate assist (50% patient effort)  -MP      Assistive Device (Sit-Stand Transfers)  walker, front-wheeled  -MP      Recorded by [MP] Daniel Solano, PT 02/17/19 1132      Row Name 02/17/19 1100             Stand-Sit Transfer    Stand-Sit [place] (Transfers)  minimum assist (75% patient effort)  -MP      Assistive Device (Stand-Sit Transfers)  walker, front-wheeled  -MP      Recorded by [MP] Daniel Solano, PT 02/17/19 1132      Row Name 02/17/19 1100             Gait/Stairs Assessment/Training    98537 - Gait Training Minutes   15  -MP      [place] Level (Gait)  minimum assist (75% patient effort)  -MP      Assistive Device (Gait)  walker, front-wheeled  -MP      Distance in Feet (Gait)  30  -MP2      Recorded by [MP] Daniel Solano, PT 02/17/19 1132  [MP2] Daniel Solano, PT 02/17/19 1133      Row Name 02/17/19 1100             Outcome Summary/Treatment Plan (PT)    Daily Summary of Progress (PT)  progress towards functional goals is fair  -MP      Barriers to Overall Progress (PT)  Weakness, pain  -MP      Plan for Continued Treatment (PT)  Progress as indicated  -MP      Anticipated Discharge Disposition (PT)  inpatient rehabilitation facility  -MP      Recorded by [MP] Daniel Solano, PT 02/17/19 1133        User Key  (r) = Recorded By, (t) = Taken By, (c) = Cosigned By    Initials Name Effective Dates Discipline    MP Daniel Solano, PT 06/22/16 -  PT                   Physical Therapy Education     Title: PT OT SLP Therapies (In Progress)     Topic: Physical Therapy (Done)     Point: Mobility training (Done)     Learning Progress Summary           Patient MARQUEZ Duenas VU by [place] at 2/17/2019 11:33 AM    MARQUEZ Arredondo VU by [place] at  2/16/2019  5:25 PM    Acceptance, E,D, VU,NR by  at 2/15/2019 11:58 AM    Acceptance, E, NR by  at 2/13/2019  3:23 PM                   Point: Home exercise program (Done)     Learning Progress Summary           Patient Acceptance, E, VU by  at 2/17/2019 11:33 AM    Eager, E, VU by  at 2/16/2019  5:25 PM    Acceptance, E,D, VU,NR by  at 2/15/2019 11:58 AM                   Point: Body mechanics (Done)     Learning Progress Summary           Patient Acceptance, E, VU by  at 2/17/2019 11:33 AM    Eager, E, VU by  at 2/16/2019  5:25 PM    Acceptance, E,D, VU,NR by  at 2/15/2019 11:58 AM                   Point: Precautions (Done)     Learning Progress Summary           Patient Acceptance, E, VU by  at 2/17/2019 11:33 AM    Eager, E, VU by  at 2/16/2019  5:25 PM    Acceptance, E,D, VU,NR by  at 2/15/2019 11:58 AM                               User Key     Initials Effective Dates Name Provider Type Discipline     04/03/18 -  Clover Mason, PT Physical Therapist PT     03/07/18 -  Anali Verdugo, BRIANNA Physical Therapy Assistant PT     06/22/16 -  Daniel Solano, PT Physical Therapist PT                PT Recommendation and Plan  Anticipated Discharge Disposition (PT): inpatient rehabilitation facility  Therapy Frequency (PT Clinical Impression): daily  Outcome Summary/Treatment Plan (PT)  Daily Summary of Progress (PT): progress towards functional goals is fair  Barriers to Overall Progress (PT): Weakness, pain  Plan for Continued Treatment (PT): Progress as indicated  Anticipated Discharge Disposition (PT): inpatient rehabilitation facility  Plan of Care Reviewed With: patient  Progress: improving  Outcome Summary: Trang ambulated 10 feet further today despite the nausea and degenerative spine pain she was experiencing.  Outcome Measures     Row Name 02/17/19 1100 02/16/19 1700 02/15/19 1255       How much help from another person do you currently need...    Turning from your back  to your side while in flat bed without using bedrails?  4  -MP  4  -MP  --    Moving from lying on back to sitting on the side of a flat bed without bedrails?  3  -MP  3  -MP  --    Moving to and from a bed to a chair (including a wheelchair)?  3  -MP  3  -MP  --    Standing up from a chair using your arms (e.g., wheelchair, bedside chair)?  3  -MP  3  -MP  --    Climbing 3-5 steps with a railing?  2  -MP  2  -MP  --    To walk in hospital room?  3  -MP  3  -MP  --    AM-PAC 6 Clicks Score  18  -MP  18  -MP  --       How much help from another is currently needed...    Putting on and taking off regular lower body clothing?  --  --  2  -SG    Bathing (including washing, rinsing, and drying)  --  --  2  -SG    Toileting (which includes using toilet bed pan or urinal)  --  --  3  -SG    Putting on and taking off regular upper body clothing  --  --  3  -SG    Taking care of personal grooming (such as brushing teeth)  --  --  3  -SG    Eating meals  --  --  3  -SG    Score  --  --  16  -SG       Functional Assessment    Outcome Measure Options  AM-PAC 6 Clicks Basic Mobility (PT)  -MP  AM-PAC 6 Clicks Basic Mobility (PT)  -MP  AM-PAC 6 Clicks Daily Activity (OT)  -SG    Row Name 02/15/19 1200             How much help from another person do you currently need...    Turning from your back to your side while in flat bed without using bedrails?  4  -SM      Moving from lying on back to sitting on the side of a flat bed without bedrails?  3  -SM      Moving to and from a bed to a chair (including a wheelchair)?  3  -SM      Standing up from a chair using your arms (e.g., wheelchair, bedside chair)?  3  -SM      Climbing 3-5 steps with a railing?  2  -SM      To walk in hospital room?  3  -SM      AM-PAC 6 Clicks Score  18  -SM         Functional Assessment    Outcome Measure Options  AM-PAC 6 Clicks Basic Mobility (PT)  -SM        User Key  (r) = Recorded By, (t) = Taken By, (c) = Cosigned By    Initials Name Provider Type     Tammi Cook, OTR Occupational Therapist    Anali Queen, PTA Physical Therapy Assistant    MP Daniel Solano, PT Physical Therapist         Time Calculation:   PT Charges     Row Name 02/17/19 1135 02/17/19 1100          Time Calculation    Start Time  1055  -MP  --     Stop Time  1125  -MP  --     Time Calculation (min)  30 min  -MP  --     PT Received On  02/17/19  -MP  --     PT - Next Appointment  02/18/19  -MP  --        Timed Charges    66595 - Gait Training Minutes   --  15  -MP       User Key  (r) = Recorded By, (t) = Taken By, (c) = Cosigned By    Initials Name Provider Type    MP Daniel Solano, PT Physical Therapist        Therapy Suggested Charges     Code   Minutes Charges    57442 (CPT®) Hc Pt Neuromusc Re Education Ea 15 Min      96948 (CPT®) Hc Pt Ther Proc Ea 15 Min      76733 (CPT®) Hc Gait Training Ea 15 Min 15 1    71962 (CPT®) Hc Pt Therapeutic Act Ea 15 Min      72292 (CPT®) Hc Pt Manual Therapy Ea 15 Min      02706 (CPT®) Hc Pt Iontophoresis Ea 15 Min      45526 (CPT®) Hc Pt Elec Stim Ea-Per 15 Min      12685 (CPT®) Hc Pt Ultrasound Ea 15 Min      05505 (CPT®) Hc Pt Self Care/Mgmt/Train Ea 15 Min      93804 (CPT®) Hc Pt Prosthetic (S) Train Initial Encounter, Each 15 Min      56230 (CPT®) Hc Pt Orthotic(S)/Prosthetic(S) Encounter, Each 15 Min      85596 (CPT®) Hc Orthotic(S) Mgmt/Train Initial Encounter, Each 15min      Total  15 1        Therapy Charges for Today     Code Description Service Date Service Provider Modifiers Qty    41650105620 HC PT THER PROC EA 15 MIN 2/16/2019 Daniel Solano, PT GP 1    81212316754 HC GAIT TRAINING EA 15 MIN 2/17/2019 Daniel Solano, PT GP 1    64716714800 HC PT THER PROC EA 15 MIN 2/17/2019 Daniel Solano, PT GP 1          PT G-Codes  Outcome Measure Options: AM-PAC 6 Clicks Basic Mobility (PT)  AM-PAC 6 Clicks Score: 18  Score: 16    Daniel Solano PT  2/17/2019

## 2019-02-17 NOTE — PLAN OF CARE
Problem: Patient Care Overview  Goal: Plan of Care Review   02/17/19 0757   Coping/Psychosocial   Plan of Care Reviewed With patient   Plan of Care Review   Progress improving   OTHER   Outcome Summary pain meds as needed, denies the need for nausea meds, vitals stable, tolerating meals, will continue to monitor

## 2019-02-17 NOTE — PROGRESS NOTES
Met w/ patient, reviewed chart and discussed w/ RN. Patient had requested a 1.5 nap. States she awakened at 0400 this morning. She states the medications make her upset at her stomach.  She anticipates going to rehab.  She provides very little effort to engage.  No SI or HI.  Looks tired.

## 2019-02-17 NOTE — PLAN OF CARE
Problem: Patient Care Overview  Goal: Plan of Care Review   02/17/19 1133   Coping/Psychosocial   Plan of Care Reviewed With patient   Plan of Care Review   Progress improving   OTHER   Outcome Summary Trang ambulated 10 feet further today despite the nausea and degenerative spine pain she was experiencing.

## 2019-02-17 NOTE — PROGRESS NOTES
Name: Trang Liu ADMIT: 2019   : 1935  PCP: Nayla Higginbotham APRN    MRN: [id number] LOS: 2 days   AGE/SEX: 83 y.o. female  ROOM: [facility]   Subjective   No reported CP SOA NVD. Not happy about increased potassium supplements but tolerating them so far.    Objective   Vital Signs  Temp:  [97.8 °F (36.6 °C)-98.2 °F (36.8 °C)] 97.8 °F (36.6 °C)  Heart Rate:  [69-82] 71  Resp:  [16-18] 16  BP: ()/(51-64) 127/59  SpO2:  [91 %-93 %] 92 %  on   ;   Device (Oxygen Therapy): room air  Body mass index is 21.93 kg/m².    Physical Exam   Constitutional: She appears well-developed. No distress.   frail   HENT:   Head: Atraumatic.   Nose: Nose normal.   Eyes: Conjunctivae and EOM are normal.   Neck: Normal range of motion. Neck supple.   Cardiovascular: Normal rate, regular rhythm and intact distal pulses.   Pulmonary/Chest: Effort normal. She has decreased breath sounds. She has no wheezes.   Abdominal: Soft. She exhibits no distension. There is no guarding.   Musculoskeletal: She exhibits no edema or tenderness.   Neurological: She is alert. No cranial nerve deficit.   Skin: Skin is warm and dry. She is not diaphoretic.   Psychiatric: She has a normal mood and affect. Her behavior is normal.   Nursing note and vitals reviewed.      Results Review:       I reviewed the patient's new clinical results.     I reviewed telemetry/EKG results, sinus rhythm.    Results from last 7 days   Lab Units 02/15/19  0519  0612   WBC 10*3/mm3 6.46 8.45   HEMOGLOBIN g/dL 12.8 13.8   PLATELETS 10*3/mm3 197 188     Results from last 7 days   Lab Units 19  0413 19  0413 02/15/19  0527 19  0306   SODIUM mmol/L 139 139 138 140   POTASSIUM mmol/L 3.7 4.2 3.2* 3.3*   CHLORIDE mmol/L 96* 96* 95* 96*   CO2 mmol/L 30.6* 31.5* 31.9* 31.2*   BUN mg/dL 12 10 9 10   CREATININE mg/dL 0.72 0.70 0.70 0.67   GLUCOSE mg/dL 98 92 84 98   Estimated Creatinine Clearance: 54.3 mL/min (by C-G formula based on  SCr of 0.72 mg/dL).  Results from last 7 days   Lab Units 02/17/19  0413 02/16/19  0413 02/15/19  0527 02/14/19  0306  02/12/19  0612   CALCIUM mg/dL 9.6 9.3 9.3 9.3   < > 10.0   ALBUMIN g/dL  --   --   --   --   --  4.10   MAGNESIUM mg/dL  --  1.7 1.5*  --   --  1.7    < > = values in this interval not displayed.         amLODIPine 10 mg Oral Daily   budesonide-formoterol 2 puff Inhalation BID - RT   carbidopa-levodopa 2 tablet Oral 4x Daily   cholecalciferol 1,000 Units Oral Daily   fluticasone 2 spray Nasal Daily   furosemide 40 mg Oral Daily   pantoprazole 40 mg Oral Daily   PARoxetine 20 mg Oral QAM   polyethylene glycol 17 g Oral Daily   potassium chloride 40 mEq Oral Daily   sodium chloride 3 mL Intravenous Q12H   sucralfate 1 g Oral 4x Daily   vitamin B-12 1,000 mcg Oral Daily      Diet Regular; Cardiac      Assessment/Plan      Active Hospital Problems    Diagnosis Date Noted   • **Hypokalemia [E87.6] 02/12/2019   • Hypomagnesemia [E83.42] 02/15/2019   • Atrial fibrillation (CMS/HCC) [I48.91] 02/12/2019   • Abdominal pain [R10.9] 02/12/2019   • Anxiety [F41.9] 02/12/2019      Resolved Hospital Problems   No resolved problems to display.     - Hypokalemia: Potassium stable overnight. Continue daily supplements with ongoing lasix.  - PACs v AFib: Has grade 2 diastolic dysfunction on echo. Not acutely volume overloaded on exam. Is on lasix chronically. Cardiology follow up in 4-6 wks..  - Abdominal Pain: Workup negative. No pain today.  - Noncompliance: Access following.  - Fall/Parkinsons/Weakness: Sinemet. PT/OT.  - Disposition: SNF/pending precert    Matthew Morgan MD  [facility]  02/17/19  9:01 AM

## 2019-02-18 VITALS
HEART RATE: 86 BPM | WEIGHT: 140 LBS | RESPIRATION RATE: 16 BRPM | DIASTOLIC BLOOD PRESSURE: 75 MMHG | TEMPERATURE: 98 F | HEIGHT: 67 IN | OXYGEN SATURATION: 94 % | SYSTOLIC BLOOD PRESSURE: 150 MMHG | BODY MASS INDEX: 21.97 KG/M2

## 2019-02-18 LAB
ANION GAP SERPL CALCULATED.3IONS-SCNC: 12.5 MMOL/L
BUN BLD-MCNC: 11 MG/DL (ref 8–23)
BUN/CREAT SERPL: 15.5 (ref 7–25)
CALCIUM SPEC-SCNC: 9.3 MG/DL (ref 8.6–10.5)
CHLORIDE SERPL-SCNC: 97 MMOL/L (ref 98–107)
CO2 SERPL-SCNC: 30.5 MMOL/L (ref 22–29)
CREAT BLD-MCNC: 0.71 MG/DL (ref 0.57–1)
GFR SERPL CREATININE-BSD FRML MDRD: 79 ML/MIN/1.73
GLUCOSE BLD-MCNC: 90 MG/DL (ref 65–99)
POTASSIUM BLD-SCNC: 3.6 MMOL/L (ref 3.5–5.2)
SODIUM BLD-SCNC: 140 MMOL/L (ref 136–145)

## 2019-02-18 PROCEDURE — 94799 UNLISTED PULMONARY SVC/PX: CPT

## 2019-02-18 PROCEDURE — 80048 BASIC METABOLIC PNL TOTAL CA: CPT | Performed by: INTERNAL MEDICINE

## 2019-02-18 RX ADMIN — SODIUM CHLORIDE, PRESERVATIVE FREE 3 ML: 5 INJECTION INTRAVENOUS at 09:31

## 2019-02-18 RX ADMIN — CARBIDOPA AND LEVODOPA 2 TABLET: 25; 100 TABLET ORAL at 12:02

## 2019-02-18 RX ADMIN — PANTOPRAZOLE SODIUM 40 MG: 40 TABLET, DELAYED RELEASE ORAL at 09:30

## 2019-02-18 RX ADMIN — POLYETHYLENE GLYCOL 3350 17 G: 17 POWDER, FOR SOLUTION ORAL at 09:24

## 2019-02-18 RX ADMIN — CARBIDOPA AND LEVODOPA 2 TABLET: 25; 100 TABLET ORAL at 09:26

## 2019-02-18 RX ADMIN — Medication 1000 MCG: at 09:24

## 2019-02-18 RX ADMIN — POTASSIUM CHLORIDE 40 MEQ: 750 CAPSULE, EXTENDED RELEASE ORAL at 09:25

## 2019-02-18 RX ADMIN — AMLODIPINE BESYLATE 10 MG: 10 TABLET ORAL at 09:26

## 2019-02-18 RX ADMIN — VITAMIN D, TAB 1000IU (100/BT) 1000 UNITS: 25 TAB at 09:26

## 2019-02-18 RX ADMIN — FUROSEMIDE 40 MG: 40 TABLET ORAL at 09:26

## 2019-02-18 RX ADMIN — FLUTICASONE PROPIONATE 2 SPRAY: 50 SPRAY, METERED NASAL at 09:27

## 2019-02-18 RX ADMIN — PAROXETINE HYDROCHLORIDE 20 MG: 20 TABLET, FILM COATED ORAL at 09:24

## 2019-02-18 RX ADMIN — BUDESONIDE AND FORMOTEROL FUMARATE DIHYDRATE 2 PUFF: 160; 4.5 AEROSOL RESPIRATORY (INHALATION) at 07:48

## 2019-02-18 NOTE — PROGRESS NOTES
Continued Stay Note  [facility]     Patient Name: Trang Liu  MRN: [id number]  Today's Date: 2/18/2019    Admit Date: 2/12/2019    Discharge Plan     Row Name 02/18/19 1350       Plan    Plan  Trino Bull pre-cert obtained     Plan Comments  Pre-cert obtained. Called and updated pts dtr Bekah, she will transport the pt and will be here between 3-3:30pm to pick her up. Pt updated at bedside. Sergei updated and given pkt. Indigo/Trilogy updated. JChasteenRN/CCP         Discharge Codes    No documentation.       Expected Discharge Date and Time     Expected Discharge Date Expected Discharge Time    Feb 18, 2019             Andra Raza RN

## 2019-02-18 NOTE — PLAN OF CARE
Problem: Patient Care Overview  Goal: Plan of Care Review  Outcome: Ongoing (interventions implemented as appropriate)   02/18/19 0512   Coping/Psychosocial   Plan of Care Reviewed With patient   Plan of Care Review   Progress improving   OTHER   Outcome Summary Rested well at long intervals. Medicated for back pain once with good relief. VSS. Will continue to monitor.       Problem: Skin Injury Risk (Adult)  Goal: Identify Related Risk Factors and Signs and Symptoms  Outcome: Ongoing (interventions implemented as appropriate)      Problem: Arrhythmia/Dysrhythmia (Symptomatic) (Adult)  Goal: Signs and Symptoms of Listed Potential Problems Will be Absent, Minimized or Managed (Arrhythmia/Dysrhythmia)  Outcome: Ongoing (interventions implemented as appropriate)

## 2019-02-19 NOTE — PROGRESS NOTES
Case Management Discharge Note    Final Note: Pt dc'd to [place]     Destination - Selection Complete      Service Provider Request Status Selected Services Address Phone Number Fax Number    [facility] Selected Skilled Nursing [address] [address] [phone]      Durable Medical Equipment      No service has been selected for the patient.      Dialysis/Infusion      No service has been selected for the patient.      Home Medical Care      No service has been selected for the patient.      Community Resources      No service has been selected for the patient.             Final Discharge Disposition Code: 03 - skilled nursing facility (SNF)

## 2019-08-30 ENCOUNTER — APPOINTMENT (OUTPATIENT)
Dept: GENERAL RADIOLOGY | Facility: HOSPITAL | Age: 84
End: 2019-08-30

## 2019-08-30 ENCOUNTER — HOSPITAL ENCOUNTER (EMERGENCY)
Facility: HOSPITAL | Age: 84
Discharge: HOME | End: 2019-08-30
Attending: EMERGENCY MEDICINE | Admitting: EMERGENCY MEDICINE

## 2019-08-30 VITALS
WEIGHT: 135 LBS | TEMPERATURE: 99.3 F | HEIGHT: 60 IN | BODY MASS INDEX: 26.5 KG/M2 | OXYGEN SATURATION: 98 % | HEART RATE: 78 BPM | SYSTOLIC BLOOD PRESSURE: 128 MMHG | DIASTOLIC BLOOD PRESSURE: 60 MMHG | RESPIRATION RATE: 16 BRPM

## 2019-08-30 PROCEDURE — 72110 X-RAY EXAM L-2 SPINE 4/>VWS: CPT

## 2019-08-30 PROCEDURE — 99282 EMERGENCY DEPT VISIT SF MDM: CPT

## 2019-08-30 RX ORDER — GABAPENTIN 400 MG/1
400 CAPSULE ORAL DAILY
Status: ON HOLD | COMMUNITY
End: 2022-12-29 | Stop reason: SDUPTHER

## 2019-08-30 RX ORDER — SODIUM CHLORIDE 0.9 % (FLUSH) 0.9 %
10 SYRINGE (ML) INJECTION AS NEEDED
Status: DISCONTINUED | OUTPATIENT
Start: 2019-08-30 | End: 2019-08-30

## 2020-03-11 ENCOUNTER — APPOINTMENT (OUTPATIENT)
Dept: GENERAL RADIOLOGY | Facility: HOSPITAL | Age: 85
End: 2020-03-11

## 2020-03-11 ENCOUNTER — HOSPITAL ENCOUNTER (EMERGENCY)
Facility: HOSPITAL | Age: 85
Discharge: HOME | End: 2020-03-11
Attending: EMERGENCY MEDICINE | Admitting: EMERGENCY MEDICINE

## 2020-03-11 ENCOUNTER — APPOINTMENT (OUTPATIENT)
Dept: CT IMAGING | Facility: HOSPITAL | Age: 85
End: 2020-03-11

## 2020-03-11 VITALS
BODY MASS INDEX: 23.83 KG/M2 | DIASTOLIC BLOOD PRESSURE: 88 MMHG | HEIGHT: 66 IN | RESPIRATION RATE: 17 BRPM | WEIGHT: 148.3 LBS | HEART RATE: 81 BPM | TEMPERATURE: 98.2 F | SYSTOLIC BLOOD PRESSURE: 179 MMHG | OXYGEN SATURATION: 94 %

## 2020-03-11 DIAGNOSIS — K44.9 DIAPHRAGMATIC HERNIA WITHOUT OBSTRUCTION AND WITHOUT GANGRENE: ICD-10-CM

## 2020-03-11 DIAGNOSIS — R10.10 UPPER ABDOMINAL PAIN: Primary | ICD-10-CM

## 2020-03-11 DIAGNOSIS — S32.010A COMPRESSION FRACTURE OF L1 VERTEBRA, INITIAL ENCOUNTER (HCC): ICD-10-CM

## 2020-03-11 DIAGNOSIS — S22.32XA CLOSED FRACTURE OF ONE RIB OF LEFT SIDE, INITIAL ENCOUNTER: ICD-10-CM

## 2020-03-11 DIAGNOSIS — S22.080A COMPRESSION FRACTURE OF T12 VERTEBRA, INITIAL ENCOUNTER (HCC): ICD-10-CM

## 2020-03-11 LAB
ALBUMIN SERPL-MCNC: 4.4 G/DL (ref 3.5–5.2)
ALBUMIN/GLOB SERPL: 2.1 G/DL
ALP SERPL-CCNC: 60 U/L (ref 39–117)
ALT SERPL W P-5'-P-CCNC: 12 U/L (ref 1–33)
ANION GAP SERPL CALCULATED.3IONS-SCNC: 15.1 MMOL/L (ref 5–15)
AST SERPL-CCNC: 13 U/L (ref 1–32)
BASOPHILS # BLD AUTO: 0.04 10*3/MM3 (ref 0–0.2)
BASOPHILS NFR BLD AUTO: 0.4 % (ref 0–1.5)
BILIRUB SERPL-MCNC: 0.7 MG/DL (ref 0.2–1.2)
BILIRUB UR QL STRIP: NEGATIVE
BUN BLD-MCNC: 13 MG/DL (ref 8–23)
BUN/CREAT SERPL: 18.8 (ref 7–25)
CALCIUM SPEC-SCNC: 10.2 MG/DL (ref 8.6–10.5)
CHLORIDE SERPL-SCNC: 102 MMOL/L (ref 98–107)
CLARITY UR: CLEAR
CO2 SERPL-SCNC: 25.9 MMOL/L (ref 22–29)
COLOR UR: YELLOW
CREAT BLD-MCNC: 0.69 MG/DL (ref 0.57–1)
DEPRECATED RDW RBC AUTO: 49.6 FL (ref 37–54)
EOSINOPHIL # BLD AUTO: 0.09 10*3/MM3 (ref 0–0.4)
EOSINOPHIL NFR BLD AUTO: 1 % (ref 0.3–6.2)
ERYTHROCYTE [DISTWIDTH] IN BLOOD BY AUTOMATED COUNT: 13.9 % (ref 12.3–15.4)
GFR SERPL CREATININE-BSD FRML MDRD: 81 ML/MIN/1.73
GLOBULIN UR ELPH-MCNC: 2.1 GM/DL
GLUCOSE BLD-MCNC: 122 MG/DL (ref 65–99)
GLUCOSE UR STRIP-MCNC: NEGATIVE MG/DL
HCT VFR BLD AUTO: 41.5 % (ref 34–46.6)
HGB BLD-MCNC: 14.5 G/DL (ref 12–15.9)
HGB UR QL STRIP.AUTO: NEGATIVE
IMM GRANULOCYTES # BLD AUTO: 0.05 10*3/MM3 (ref 0–0.05)
IMM GRANULOCYTES NFR BLD AUTO: 0.5 % (ref 0–0.5)
KETONES UR QL STRIP: NEGATIVE
LEUKOCYTE ESTERASE UR QL STRIP.AUTO: NEGATIVE
LIPASE SERPL-CCNC: 24 U/L (ref 13–60)
LYMPHOCYTES # BLD AUTO: 2.13 10*3/MM3 (ref 0.7–3.1)
LYMPHOCYTES NFR BLD AUTO: 23.3 % (ref 19.6–45.3)
MAGNESIUM SERPL-MCNC: 1.5 MG/DL (ref 1.6–2.4)
MCH RBC QN AUTO: 34.3 PG (ref 26.6–33)
MCHC RBC AUTO-ENTMCNC: 34.9 G/DL (ref 31.5–35.7)
MCV RBC AUTO: 98.1 FL (ref 79–97)
MONOCYTES # BLD AUTO: 0.75 10*3/MM3 (ref 0.1–0.9)
MONOCYTES NFR BLD AUTO: 8.2 % (ref 5–12)
NEUTROPHILS # BLD AUTO: 6.07 10*3/MM3 (ref 1.7–7)
NEUTROPHILS NFR BLD AUTO: 66.6 % (ref 42.7–76)
NITRITE UR QL STRIP: NEGATIVE
NRBC BLD AUTO-RTO: 0 /100 WBC (ref 0–0.2)
NT-PROBNP SERPL-MCNC: 397.8 PG/ML (ref 5–1800)
PH UR STRIP.AUTO: 7 [PH] (ref 5–8)
PLATELET # BLD AUTO: 182 10*3/MM3 (ref 140–450)
PMV BLD AUTO: 11.3 FL (ref 6–12)
POTASSIUM BLD-SCNC: 3.3 MMOL/L (ref 3.5–5.2)
PROT SERPL-MCNC: 6.5 G/DL (ref 6–8.5)
PROT UR QL STRIP: NEGATIVE
RBC # BLD AUTO: 4.23 10*6/MM3 (ref 3.77–5.28)
SODIUM BLD-SCNC: 143 MMOL/L (ref 136–145)
SP GR UR STRIP: 1.02 (ref 1–1.03)
TROPONIN T SERPL-MCNC: <0.01 NG/ML (ref 0–0.03)
UROBILINOGEN UR QL STRIP: NORMAL
WBC NRBC COR # BLD: 9.13 10*3/MM3 (ref 3.4–10.8)

## 2020-03-11 PROCEDURE — 84484 ASSAY OF TROPONIN QUANT: CPT | Performed by: EMERGENCY MEDICINE

## 2020-03-11 PROCEDURE — 93010 ELECTROCARDIOGRAM REPORT: CPT | Performed by: INTERNAL MEDICINE

## 2020-03-11 PROCEDURE — 85025 COMPLETE CBC W/AUTO DIFF WBC: CPT | Performed by: EMERGENCY MEDICINE

## 2020-03-11 PROCEDURE — 83735 ASSAY OF MAGNESIUM: CPT | Performed by: EMERGENCY MEDICINE

## 2020-03-11 PROCEDURE — 99285 EMERGENCY DEPT VISIT HI MDM: CPT

## 2020-03-11 PROCEDURE — 25010000002 IOPAMIDOL 61 % SOLUTION: Performed by: EMERGENCY MEDICINE

## 2020-03-11 PROCEDURE — 83690 ASSAY OF LIPASE: CPT | Performed by: EMERGENCY MEDICINE

## 2020-03-11 PROCEDURE — 96374 THER/PROPH/DIAG INJ IV PUSH: CPT

## 2020-03-11 PROCEDURE — 81003 URINALYSIS AUTO W/O SCOPE: CPT | Performed by: EMERGENCY MEDICINE

## 2020-03-11 PROCEDURE — 25010000002 ONDANSETRON PER 1 MG: Performed by: EMERGENCY MEDICINE

## 2020-03-11 PROCEDURE — 93005 ELECTROCARDIOGRAM TRACING: CPT | Performed by: EMERGENCY MEDICINE

## 2020-03-11 PROCEDURE — 83880 ASSAY OF NATRIURETIC PEPTIDE: CPT | Performed by: EMERGENCY MEDICINE

## 2020-03-11 PROCEDURE — 99214 OFFICE O/P EST MOD 30 MIN: CPT | Performed by: NURSE PRACTITIONER

## 2020-03-11 PROCEDURE — 74177 CT ABD & PELVIS W/CONTRAST: CPT

## 2020-03-11 PROCEDURE — 71101 X-RAY EXAM UNILAT RIBS/CHEST: CPT

## 2020-03-11 PROCEDURE — 80053 COMPREHEN METABOLIC PANEL: CPT | Performed by: EMERGENCY MEDICINE

## 2020-03-11 RX ORDER — HYDROCODONE BITARTRATE AND ACETAMINOPHEN 5; 325 MG/1; MG/1
1-2 TABLET ORAL EVERY 6 HOURS PRN
Qty: 24 TABLET | Refills: 0 | Status: SHIPPED | OUTPATIENT
Start: 2020-03-11 | End: 2022-12-29 | Stop reason: HOSPADM

## 2020-03-11 RX ORDER — ONDANSETRON 2 MG/ML
4 INJECTION INTRAMUSCULAR; INTRAVENOUS ONCE
Status: COMPLETED | OUTPATIENT
Start: 2020-03-11 | End: 2020-03-11

## 2020-03-11 RX ORDER — POTASSIUM CHLORIDE 750 MG/1
40 CAPSULE, EXTENDED RELEASE ORAL ONCE
Status: COMPLETED | OUTPATIENT
Start: 2020-03-11 | End: 2020-03-11

## 2020-03-11 RX ORDER — SUCRALFATE 1 G/1
1 TABLET ORAL 4 TIMES DAILY
Qty: 120 TABLET | Refills: 0 | Status: SHIPPED | OUTPATIENT
Start: 2020-03-11

## 2020-03-11 RX ORDER — SODIUM CHLORIDE 0.9 % (FLUSH) 0.9 %
10 SYRINGE (ML) INJECTION AS NEEDED
Status: DISCONTINUED | OUTPATIENT
Start: 2020-03-11 | End: 2020-03-11 | Stop reason: HOSPADM

## 2020-03-11 RX ADMIN — POTASSIUM CHLORIDE 40 MEQ: 10 CAPSULE, COATED, EXTENDED RELEASE ORAL at 12:32

## 2020-03-11 RX ADMIN — ONDANSETRON 4 MG: 2 INJECTION INTRAMUSCULAR; INTRAVENOUS at 12:54

## 2020-03-11 RX ADMIN — Medication 400 MG: at 12:32

## 2020-03-11 RX ADMIN — IOPAMIDOL 85 ML: 612 INJECTION, SOLUTION INTRAVENOUS at 11:42

## 2020-03-11 NOTE — CONSULTS
Inpatient General Surgery Consult  Consult performed by: Anali Quinn APRN  Consult ordered by: Vinnie Ross MD  Reason for consult: Diaphragmatic hernia, rib fracture          Patient Care Team:  Nayla Higginbotham APRN as PCP - General (Family Medicine)    Chief Complaint   Patient presents with   • Chest Pain       Subjective     History of Present Illness     Trang Liu is an 84 year-old female with a medical history including GERD, hypertension, rheumatoid arthritis, Parkinson's Disease, heart failure and anxiety.  She has poor mobility and her family provides assistance. They are accompanying her in the emergency room today.  Apparently, the patient has a history of chronic abdominal pain.  She reports that she suffered a fall a few months ago and has had continuing abdominal pain since that time.  She reports that it is constant but waxes and wanes.  She also reports some intermittent nausea, vomiting and diarrhea.  She came to the emergency department as her symptoms have worsened over the last couple of days.  She did report a recent negative CT of the abdomen and pelvis which was ordered by her primary provider.  The patient presents to the emergency department today due to continued worsening abdominal pain.  She reports some pain in her left chest area that has not subsided since her fall.  Chest and left rib x-rays demonstrated a minimally angulated acute fracture along the anterior left rib.  No other fractures are identified. A CT of the abdomen and pelvis performed earlier today identified an anterior diaphragmatic hernia containing a portion of the transverse colon but without evidence of obstruction.  Compression fractures are identified at the T12 and L1 vertebral bodies.  We have been asked to see this patient for further evaluation due to her significant abdominal pain and abnormal CT of the abdomen.    Of note, when patient was not in the room, patient's daughter reports to  RN that the patient has a history of Munchhausen syndrome.  She reports that she is frequently in the emergency department with her mother for multiple complaints every couple of months.    Review of Systems   Constitutional: Negative.    HENT: Negative for trouble swallowing.    Eyes: Negative.    Respiratory: Negative for cough and shortness of breath.    Cardiovascular: Positive for chest pain. Negative for leg swelling.   Gastrointestinal: Positive for abdominal pain and nausea. Negative for vomiting.        Complains of generalized discomfort and epigastric pain   Endocrine: Negative.    Genitourinary: Negative.    Musculoskeletal: Positive for arthralgias and back pain.   Skin: Negative.    Allergic/Immunologic: Negative.    Neurological: Negative.    Hematological: Negative.    Psychiatric/Behavioral: Negative.         Patient Active Problem List   Diagnosis   • Upper GI bleed   • Diastolic dysfunction   • GERD (gastroesophageal reflux disease)   • Hypertension   • Parkinsons (CMS/HCC)   • Rheumatoid arthritis (CMS/HCC)   • Anemia, posthemorrhagic, acute   • Gastroesophageal reflux disease   • Urinary tract infection, site not specified   • Rheumatoid arthritis (CMS/HCC)   • Hypokalemia   • Atrial fibrillation (CMS/HCC)   • Abdominal pain   • Anxiety   • Hypomagnesemia     Past Medical History:   Diagnosis Date   • Anxiety    • Aortic valve insufficiency    • Ataxia    • Diastolic dysfunction    • GERD (gastroesophageal reflux disease)    • H/O hernia repair     umbilical   • History of hip replacement     left   • Hypertension    • Insomnia    • Mitral regurgitation    • Parkinsons (CMS/HCC)    • Rheumatoid arthritis (CMS/HCC)    • Rotator cuff disorder     left side, also old fracture, doesn';t use this arm due to pain   • Spastic colon    • Tremor    • Tricuspid valve regurgitation    • UTI (urinary tract infection)     frequent   • Wears glasses      Past Surgical History:   Procedure Laterality Date   •  APPENDECTOMY     • CATARACT EXTRACTION     • ENDOSCOPY N/A 5/30/2018    LA Grade B reflux esophagitis, HH    • HERNIA REPAIR     • HYSTERECTOMY       Family History   Problem Relation Age of Onset   • Diabetes Mother    • Arthritis Mother    • Heart failure Mother    • Diabetes Father    • Heart disease Father    • Arthritis Father      Social History     Socioeconomic History   • Marital status:      Spouse name: Not on file   • Number of children: Not on file   • Years of education: Not on file   • Highest education level: Not on file   Tobacco Use   • Smoking status: Never Smoker   • Smokeless tobacco: Never Used   Substance and Sexual Activity   • Alcohol use: No   • Drug use: No   • Sexual activity: Defer       (Not in a hospital admission)  Allergies   Allergen Reactions   • Cimetidine Unknown (See Comments)     unknown   • Codeine Itching   • Doxycycline Hives   • Guaifenesin & Derivatives Unknown (See Comments)     Unknown     • Nitrofuran Derivatives Itching   • Oxaprozin Itching   • Penicillins Itching     unknown   • Sulfa Antibiotics Rash     And itching       Objective      Vital Signs  Temp:  [98.2 °F (36.8 °C)] 98.2 °F (36.8 °C)  Heart Rate:  [72-85] 81  Resp:  [14-18] 17  BP: (122-179)/() 179/88    Intake & Output (last day)     None          Physical Exam   Constitutional: She is oriented to person, place, and time. She appears well-developed and well-nourished. No distress.   HENT:   Head: Normocephalic and atraumatic.   Eyes: Conjunctivae and EOM are normal. No scleral icterus.   Neck: Normal range of motion. Neck supple. No tracheal deviation present.   Cardiovascular: Normal rate, regular rhythm, normal heart sounds and intact distal pulses.   No murmur heard.  Pulmonary/Chest: Effort normal and breath sounds normal. No stridor. No respiratory distress. She has no wheezes. She has no rales. She exhibits tenderness (left lower chest wall). She exhibits no crepitus and no edema.    Abdominal: Soft. Bowel sounds are normal. She exhibits no mass. There is tenderness in the epigastric area and left lower quadrant. There is no rigidity, no rebound and no guarding.   Musculoskeletal: Normal range of motion. She exhibits no edema.   Lymphadenopathy:     She has no cervical adenopathy.   Neurological: She is alert and oriented to person, place, and time.   Skin: Skin is warm and dry. Capillary refill takes less than 2 seconds. She is not diaphoretic.   Psychiatric: She has a normal mood and affect. Her behavior is normal. Judgment and thought content normal.   Nursing note and vitals reviewed.      Results Review:    I reviewed the patient's new clinical results.  I reviewed the patient's new imaging results and agree with the interpretation.  I reviewed the patient's other test results and agree with the interpretation  Discussed with patient, family at bedside, RN, Dr. Valentin, Dr. Dougherty and Dr. Ross.    Imaging Results (Last 24 Hours)     Procedure Component Value Units Date/Time    CT Abdomen Pelvis With Contrast [[id number]] Collected:  03/11/20 1259     Updated:  03/11/20 1259    Narrative:       CT ABDOMEN AND PELVIS WITH CONTRAST     HISTORY: Left-sided abdominal pain with nausea.     TECHNIQUE: Axial CT images of the abdomen and pelvis were obtained  following administration of intravenous contrast. The patient was not  given oral contrast Coronal and sagittal reformats were obtained.     COMPARISON: 02/12/2019     FINDINGS: There is an anterior diaphragmatic hernia containing a portion  of the transverse colon without evidence of obstruction. A small amount  of stranding and fluid is seen within the hernia.     The liver demonstrates normal attenuation. No focal hepatic lesion or  intrahepatic biliary dilatation is seen. The gallbladder and spleen are  normal. The pancreas is normal without ductal dilatation. Mild  thickening of bilateral adrenal glands is unchanged from prior  imaging.  Both kidneys are normal in size and attenuation. No renal calculi or  hydronephrosis. Urinary bladder is partially distended.  The small and  large bowel loops demonstrate normal caliber. No pathological  retroperitoneal lymphadenopathy. Marked atherosclerotic disease is seen  within the abdominal aorta and its branches. Marked ostial calcification  at the origin of the superior mesenteric artery with suspected at least  moderate luminal narrowing.  No ascites is seen. Evaluation of the  pelvis is limited secondary to patient's left hip prosthesis. Marked  degenerative disc disease is seen in the spine with vacuum disc  phenomena at multiple levels. A T12 compression body fracture shows mild  further decrease in height. L1 compression fracture with both vertical  and horizontal component has significantly progressed in the interim  from prior imaging dated 02/12/2019..       Impression:       1.  Anterior diaphragmatic hernia containing a portion of the transverse  colon without evidence of bowel obstruction. There is a small amount of  fluid and stranding seen within the hernial sac. This is  age-indeterminate and clinical correlation is recommended for hernia  incarceration.  2.  Compression fracture of the L1 vertebral body markedly progressed in  the interim from prior imaging in 02/2019. There is nearly 50% anterior  height loss at this level. The fracture demonstrates both a horizontal  and vertical component.     These findings were discussed with Vinnie Ross MD, by telephone.      Radiation dose reduction techniques were utilized, including automated  exposure control and exposure modulation based on body size.          XR Ribs Left With PA Chest [[id number]] Collected:  03/11/20 1123     Updated:  03/11/20 1130    Narrative:       CHEST AND LEFT RIB DETAIL X-RAYS     CLINICAL HISTORY: Patient fell. Mid sternal and left-sided chest pain.     A PA chest and multiple oblique views of the ribs of  the left hemithorax  were obtained. There is subtle area of minimally angulated acute  fracture involving the anterior tip of the left 11th rib. No other  fractures are identified. The lungs are fairly well-expanded and appear  free of infiltrates. There is no pneumothorax. A large hiatal hernia is  noted.     This report was finalized on 3/11/2020 11:27 AM by Dr. Satnam Caruso M.D.             Lab Results:  Lab Results (last 24 hours)     Procedure Component Value Units Date/Time    Urinalysis With Microscopic If Indicated (No Culture) - Urine, Clean Catch [[id number]]  (Normal) Collected:  03/11/20 1216    Specimen:  Urine, Clean Catch Updated:  03/11/20 1234     Color, UA Yellow     Appearance, UA Clear     pH, UA 7.0     Specific Gravity, UA 1.022     Glucose, UA Negative     Ketones, UA Negative     Bilirubin, UA Negative     Blood, UA Negative     Protein, UA Negative     Leuk Esterase, UA Negative     Nitrite, UA Negative     Urobilinogen, UA 0.2 E.U./dL    Narrative:       Urine microscopic not indicated.    Comprehensive Metabolic Panel [[id number]]  (Abnormal) Collected:  03/11/20 1039    Specimen:  Blood Updated:  03/11/20 1121     Glucose 122 mg/dL      BUN 13 mg/dL      Creatinine 0.69 mg/dL      Sodium 143 mmol/L      Potassium 3.3 mmol/L      Chloride 102 mmol/L      CO2 25.9 mmol/L      Calcium 10.2 mg/dL      Total Protein 6.5 g/dL      Albumin 4.40 g/dL      ALT (SGPT) 12 U/L      AST (SGOT) 13 U/L      Alkaline Phosphatase 60 U/L      Total Bilirubin 0.7 mg/dL      eGFR Non African Amer 81 mL/min/1.73      Globulin 2.1 gm/dL      A/G Ratio 2.1 g/dL      BUN/Creatinine Ratio 18.8     Anion Gap 15.1 mmol/L     Narrative:       GFR Normal >60  Chronic Kidney Disease <60  Kidney Failure <15      Lipase [[id number]]  (Normal) Collected:  03/11/20 1039    Specimen:  Blood Updated:  03/11/20 1121     Lipase 24 U/L     Magnesium [[id number]]  (Abnormal) Collected:  03/11/20 1039    Specimen:  Blood Updated:   03/11/20 1121     Magnesium 1.5 mg/dL     Troponin [[id number]]  (Normal) Collected:  03/11/20 1039    Specimen:  Blood Updated:  03/11/20 1121     Troponin T <0.010 ng/mL     Narrative:       Troponin T Reference Range:  <= 0.03 ng/mL-   Negative for AMI  >0.03 ng/mL-     Abnormal for myocardial necrosis.  Clinicians would have to utilize clinical acumen, EKG, Troponin and serial changes to determine if it is an Acute Myocardial Infarction or myocardial injury due to an underlying chronic condition.       Results may be falsely decreased if patient taking Biotin.      BNP [[id number]]  (Normal) Collected:  03/11/20 1039    Specimen:  Blood Updated:  03/11/20 1118     proBNP 397.8 pg/mL     Narrative:       Among patients with dyspnea, NT-proBNP is highly sensitive for the detection of acute congestive heart failure. In addition NT-proBNP of <300 pg/ml effectively rules out acute congestive heart failure with 99% negative predictive value.    Results may be falsely decreased if patient taking Biotin.      CBC & Differential [[id number]] Collected:  03/11/20 1039    Specimen:  Blood Updated:  03/11/20 1100    Narrative:       The following orders were created for panel order CBC & Differential.  Procedure                               Abnormality         Status                     ---------                               -----------         ------                     CBC Auto Differential[[id number]]        Abnormal            Final result                 Please view results for these tests on the individual orders.    CBC Auto Differential [[id number]]  (Abnormal) Collected:  03/11/20 1039    Specimen:  Blood Updated:  03/11/20 1100     WBC 9.13 10*3/mm3      RBC 4.23 10*6/mm3      Hemoglobin 14.5 g/dL      Hematocrit 41.5 %      MCV 98.1 fL      MCH 34.3 pg      MCHC 34.9 g/dL      RDW 13.9 %      RDW-SD 49.6 fl      MPV 11.3 fL      Platelets 182 10*3/mm3      Neutrophil % 66.6 %      Lymphocyte % 23.3 %      Monocyte  % 8.2 %      Eosinophil % 1.0 %      Basophil % 0.4 %      Immature Grans % 0.5 %      Neutrophils, Absolute 6.07 10*3/mm3      Lymphocytes, Absolute 2.13 10*3/mm3      Monocytes, Absolute 0.75 10*3/mm3      Eosinophils, Absolute 0.09 10*3/mm3      Basophils, Absolute 0.04 10*3/mm3      Immature Grans, Absolute 0.05 10*3/mm3      nRBC 0.0 /100 WBC               Assessment/Plan     Diaphragmatic hernia without obstruction or gangrene, initial encounter  Acute left rib fracture  GERD    Assessment & Plan     Discussed imaging with radiology and reviewed these images with Dr. Valentin.  No obstruction is seen in the CT of the abdomen pelvis.  Patient does have an acute left rib fracture which could be associated with her complaints of the left chest wall pain.  Additionally, the epigastric pain could likely be related to her GERD.  Per the patient's daughter, she lays down when eating.  Advised the patient that this was not safe and would likely increase her risk of aspiration in addition to symptoms of acid reflux.    Discussed with Dr. Ross of Emergency Medicine who will initiate the patient on Carafate and we will have the patient follow-up in our office as an outpatient in 2 weeks.    I discussed the patients findings and our recommendations with patient, family, consulting provider and Dr. Valentin.    Thank you for this consult and allowing us to participate in the care of your patient.  We will follow along with you during this hospitalization.       JODY Sloan  [facility]  03/11/20  14:36

## 2020-03-11 NOTE — ED PROVIDER NOTES
EMERGENCY DEPARTMENT ENCOUNTER    CHIEF COMPLAINT  Chief Complaint: CP  History given by: patient  History limited by: poor historian  Room Number: 13/13  PMD: Nayla Higginbotham APRN      HPI:  Pt is a 84 y.o. female who presents complaining of CP that started 2 weeks ago. Pt also c/o abd pain, nausea, vomiting and diarrhea. Pt denies SOA, hip pain and fever. The pt reports having L sided abd pain for the past few months secondary to a fall. She was seen in the ED at [facility] on 8/30/19 where she had a negative Lumbar Spine. Over the past 2 weeks, she developed CP along with the abd pain which has since remained constant. The pt has also experienced intermittent N/V/D. She recalls being seen by her PMD recently where she had a negative CT Abd Pelvis. Her sx's have only worsened, so she came to the ED for further evaluation. Hx limited secondary to pt being a poor historian. Hx of Parkinson's Disease, RA, GERD, HTN    Duration:  2 weeks  Onset: gradual  Timing: constant  Location: chest  Radiation: none stated  Quality: pain  Intensity/Severity: moderate  Progression: worsening  Associated Symptoms: abd pain, nausea, vomiting and diarrhea.  Aggravating Factors: none stated  Alleviating Factors: none stated  Previous Episodes: none stated  Treatment before arrival: Pt has had a negative CT Abd Pelvis and XR Lumbar Spine    PAST MEDICAL HISTORY  Active Ambulatory Problems     Diagnosis Date Noted   • Upper GI bleed 05/27/2018   • Diastolic dysfunction 05/27/2018   • GERD (gastroesophageal reflux disease) 05/27/2018   • Hypertension 05/27/2018   • Parkinsons (CMS/[facility]) 05/27/2018   • Rheumatoid arthritis (CMS/[facility]) 05/27/2018   • Anemia, posthemorrhagic, acute 05/28/2018   • Gastroesophageal reflux disease 05/29/2018   • Urinary tract infection, site not specified 12/18/2018   • Rheumatoid arthritis (CMS/[facility]) 01/02/2019   • Hypokalemia 02/12/2019   • Atrial fibrillation (CMS/[facility]) 02/12/2019   • Abdominal  pain 02/12/2019   • Anxiety 02/12/2019   • Hypomagnesemia 02/15/2019     Resolved Ambulatory Problems     Diagnosis Date Noted   • No Resolved Ambulatory Problems     Past Medical History:   Diagnosis Date   • Aortic valve insufficiency    • Ataxia    • H/O hernia repair    • History of hip replacement    • Insomnia    • Mitral regurgitation    • Rotator cuff disorder    • Spastic colon    • Tremor    • Tricuspid valve regurgitation    • UTI (urinary tract infection)    • Wears glasses        PAST SURGICAL HISTORY  Past Surgical History:   Procedure Laterality Date   • APPENDECTOMY     • CATARACT EXTRACTION     • ENDOSCOPY N/A 5/30/2018    LA Grade B reflux esophagitis, HH    • HERNIA REPAIR     • HYSTERECTOMY         FAMILY HISTORY  Family History   Problem Relation Age of Onset   • Diabetes Mother    • Arthritis Mother    • Heart failure Mother    • Diabetes Father    • Heart disease Father    • Arthritis Father        SOCIAL HISTORY  Social History     Socioeconomic History   • Marital status:      Spouse name: Not on file   • Number of children: Not on file   • Years of education: Not on file   • Highest education level: Not on file   Tobacco Use   • Smoking status: Never Smoker   • Smokeless tobacco: Never Used   Substance and Sexual Activity   • Alcohol use: No   • Drug use: No   • Sexual activity: Defer       ALLERGIES  Cimetidine; Codeine; Doxycycline; Guaifenesin & derivatives; Nitrofuran derivatives; Oxaprozin; Penicillins; and Sulfa antibiotics    REVIEW OF SYSTEMS  Review of Systems   Constitutional: Negative for fever.   HENT: Negative for sore throat.    Respiratory: Negative for cough and shortness of breath.    Cardiovascular: Positive for chest pain.   Gastrointestinal: Positive for abdominal pain, diarrhea, nausea and vomiting.   Genitourinary: Negative for dysuria.   Musculoskeletal: Negative for neck pain.   Skin: Negative for rash.   Neurological: Negative for weakness, numbness and  headaches.   All other systems reviewed and are negative.      PHYSICAL EXAM  ED Triage Vitals   Temp Heart Rate Resp BP SpO2   03/11/20 1020 03/11/20 1021 03/11/20 1021 03/11/20 1021 03/11/20 1021   98.2 °F (36.8 °C) 72 18 122/72 95 %      Temp src Heart Rate Source Patient Position BP Location FiO2 (%)   03/11/20 1020 -- -- -- --   Tympanic           Physical Exam   Constitutional: She is oriented to person, place, and time. No distress.   HENT:   Head: Normocephalic and atraumatic.   Eyes: Pupils are equal, round, and reactive to light. EOM are normal.   Neck: Normal range of motion. Neck supple.   Cardiovascular: Normal rate, regular rhythm and normal heart sounds.   Pulmonary/Chest: Effort normal and breath sounds normal. No respiratory distress. She exhibits tenderness (mild L rib without subcutaneous air). She exhibits no crepitus.   Abdominal: Soft. There is tenderness in the epigastric area and left upper quadrant. There is no rebound and no guarding.   Musculoskeletal: Normal range of motion. She exhibits no edema.   Neurological: She is alert and oriented to person, place, and time. She has normal sensation and normal strength.   Skin: Skin is warm and dry. No rash noted.   Psychiatric: Mood and affect normal.   Nursing note and vitals reviewed.      LAB RESULTS  Lab Results (last 24 hours)     Procedure Component Value Units Date/Time    CBC & Differential [[id number]] Collected:  03/11/20 1039    Specimen:  Blood Updated:  03/11/20 1100    Narrative:       The following orders were created for panel order CBC & Differential.  Procedure                               Abnormality         Status                     ---------                               -----------         ------                     CBC Auto Differential[[id number]]        Abnormal            Final result                 Please view results for these tests on the individual orders.    Comprehensive Metabolic Panel [[id number]]  (Abnormal)  Collected:  03/11/20 1039    Specimen:  Blood Updated:  03/11/20 1121     Glucose 122 mg/dL      BUN 13 mg/dL      Creatinine 0.69 mg/dL      Sodium 143 mmol/L      Potassium 3.3 mmol/L      Chloride 102 mmol/L      CO2 25.9 mmol/L      Calcium 10.2 mg/dL      Total Protein 6.5 g/dL      Albumin 4.40 g/dL      ALT (SGPT) 12 U/L      AST (SGOT) 13 U/L      Alkaline Phosphatase 60 U/L      Total Bilirubin 0.7 mg/dL      eGFR Non African Amer 81 mL/min/1.73      Globulin 2.1 gm/dL      A/G Ratio 2.1 g/dL      BUN/Creatinine Ratio 18.8     Anion Gap 15.1 mmol/L     Narrative:       GFR Normal >60  Chronic Kidney Disease <60  Kidney Failure <15      Lipase [[id number]]  (Normal) Collected:  03/11/20 1039    Specimen:  Blood Updated:  03/11/20 1121     Lipase 24 U/L     Magnesium [[id number]]  (Abnormal) Collected:  03/11/20 1039    Specimen:  Blood Updated:  03/11/20 1121     Magnesium 1.5 mg/dL     BNP [[id number]]  (Normal) Collected:  03/11/20 1039    Specimen:  Blood Updated:  03/11/20 1118     proBNP 397.8 pg/mL     Narrative:       Among patients with dyspnea, NT-proBNP is highly sensitive for the detection of acute congestive heart failure. In addition NT-proBNP of <300 pg/ml effectively rules out acute congestive heart failure with 99% negative predictive value.    Results may be falsely decreased if patient taking Biotin.      Troponin [[id number]]  (Normal) Collected:  03/11/20 1039    Specimen:  Blood Updated:  03/11/20 1121     Troponin T <0.010 ng/mL     Narrative:       Troponin T Reference Range:  <= 0.03 ng/mL-   Negative for AMI  >0.03 ng/mL-     Abnormal for myocardial necrosis.  Clinicians would have to utilize clinical acumen, EKG, Troponin and serial changes to determine if it is an Acute Myocardial Infarction or myocardial injury due to an underlying chronic condition.       Results may be falsely decreased if patient taking Biotin.      CBC Auto Differential [[id number]]  (Abnormal) Collected:  03/11/20  1039    Specimen:  Blood Updated:  03/11/20 1100     WBC 9.13 10*3/mm3      RBC 4.23 10*6/mm3      Hemoglobin 14.5 g/dL      Hematocrit 41.5 %      MCV 98.1 fL      MCH 34.3 pg      MCHC 34.9 g/dL      RDW 13.9 %      RDW-SD 49.6 fl      MPV 11.3 fL      Platelets 182 10*3/mm3      Neutrophil % 66.6 %      Lymphocyte % 23.3 %      Monocyte % 8.2 %      Eosinophil % 1.0 %      Basophil % 0.4 %      Immature Grans % 0.5 %      Neutrophils, Absolute 6.07 10*3/mm3      Lymphocytes, Absolute 2.13 10*3/mm3      Monocytes, Absolute 0.75 10*3/mm3      Eosinophils, Absolute 0.09 10*3/mm3      Basophils, Absolute 0.04 10*3/mm3      Immature Grans, Absolute 0.05 10*3/mm3      nRBC 0.0 /100 WBC     Urinalysis With Microscopic If Indicated (No Culture) - Urine, Clean Catch [[id number]]  (Normal) Collected:  03/11/20 1216    Specimen:  Urine, Clean Catch Updated:  03/11/20 1234     Color, UA Yellow     Appearance, UA Clear     pH, UA 7.0     Specific Gravity, UA 1.022     Glucose, UA Negative     Ketones, UA Negative     Bilirubin, UA Negative     Blood, UA Negative     Protein, UA Negative     Leuk Esterase, UA Negative     Nitrite, UA Negative     Urobilinogen, UA 0.2 E.U./dL    Narrative:       Urine microscopic not indicated.          I ordered the above labs and reviewed the results    RADIOLOGY  CT Abdomen Pelvis With Contrast   Preliminary Result   1.  Anterior diaphragmatic hernia containing a portion of the transverse   colon without evidence of bowel obstruction. There is a small amount of   fluid and stranding seen within the hernial sac. This is   age-indeterminate and clinical correlation is recommended for hernia   incarceration.   2.  Compression fracture of the L1 vertebral body markedly progressed in   the interim from prior imaging in 02/2019. There is nearly 50% anterior   height loss at this level. The fracture demonstrates both a horizontal   and vertical component.       These findings were discussed with  Vinnie Ross MD, by telephone.        Radiation dose reduction techniques were utilized, including automated   exposure control and exposure modulation based on body size.              XR Ribs Left With PA Chest   Final Result           I ordered the above noted radiological studies. Interpreted by radiologist. Discussed with radiologist (Dr. Rogers). Reviewed by me in PACS.       PROCEDURES  EKG          EKG time: 1027  Rhythm/Rate: SR, 92 with frequent PAC's  P waves and VT: first degree AV block  QRS, axis: poor R wave progression anteriorly  ST and T waves: nonspecific ST changes    Interpreted Contemporaneously by me, independently viewed  No significant change compared to prior 2/12/19      PROGRESS AND CONSULTS       1039 CT Abd Pelvis ordered. XR Ribs Left ordered. Lipase ordered. CMP ordered. Magnesium ordered. BNP ordered. Troponin ordered. Labs ordered.     1116 Mag-Ox ordered. Micro-K ordered.     1216 Discussed pt w/Dr. Rogers (Radiology) regarding CT Abd Pelvis which shows a diaphragmatic hernia with surrounding stranding without signs of obstruction.     1218 Call made to Surgery    1219 Discussed pt w/Dr. Rogers (Radiology) regarding CT Abd Pelvis and XR Ribs which shows a nondisplaced rib fractures.    1223 Patient is resting comfortably and in NAD. Patient is stable. BP- (!) 172/101 HR- 85 Temp- 98.2 °F (36.8 °C) (Tympanic) O2 sat- 94%. Informed the patient results of CT Abd Pelvis and XR Ribs show diaphragmatic hernia with surround stranding and nondisplaced rib fractures. Discussed the plan to consult surgery. Pt understands and agrees with the plan, all questions answered.    1232 Discussed pt w/JODY Garzon (Thoracic Surgery) who will follow up after consulting physician.     1242 Zofran ordered.     1337 JODY Garzon has evaluated the pt at bedside and recommends d/c pt with Carafate.     1343 Rechecked pt. Pt is resting comfortably in NAD. Discussed w/pt plan is to d/c  with Carafate and Norco. Pt understands and agrees with the plan. All questions were answered.    MEDICAL DECISION MAKING  Results were reviewed/discussed with the patient and they were also made aware of online access. Pt also made aware that some labs, such as cultures, will not be resulted during ER visit and follow up with PMD is necessary.     MDM  Number of Diagnoses or Management Options     Amount and/or Complexity of Data Reviewed  Clinical lab tests: ordered and reviewed  Tests in the radiology section of CPT®: ordered and reviewed (CT Abd Pelvis  XR Ribs Left)  Tests in the medicine section of CPT®: ordered and reviewed (See EKG procedure note)  Discussion of test results with the performing providers: yes (Dr. Rogers (Radiology))  Decide to obtain previous medical records or to obtain history from someone other than the patient: yes  Review and summarize past medical records: yes (The pt was seen in the ED at [facility] on 8/30/20 where she had a negative XR Lumbar Spine secondary to a fall.  The pt had a negative CT Head on 2/6/20  The pt was hospitalized for hypokalemia and hypocalcemia on 2/19/20)  Independent visualization of images, tracings, or specimens: yes           DIAGNOSIS  Final diagnoses:   Upper abdominal pain   Diaphragmatic hernia without obstruction and without gangrene   Closed fracture of one rib of left side, initial encounter   Compression fracture of T12 vertebra, initial encounter (CMS/[facility])   Compression fracture of L1 vertebra, initial encounter (CMS/[facility])       DISPOSITION  DISCHARGE    Patient discharged in stable condition.    Reviewed implications of results, diagnosis, meds, responsibility to follow up, warning signs and symptoms of possible worsening, potential complications and reasons to return to ER.    Patient/Family voiced understanding of above instructions.    Discussed plan for discharge, as there is no emergent indication for admission. Patient referred  to primary care provider for BP management due to today's BP. Pt/family is agreeable and understands need for follow up and repeat testing.  Pt is aware that discharge does not mean that nothing is wrong but it indicates no emergency is present that requires admission and they must continue care with follow-up as given below or physician of their choice.     FOLLOW-UP  Nayla Higginbotham, JOYD  [address]  [facility] [id number] [phone]    Schedule an appointment as soon as possible for a visit   for continued care    Sis Valentin MD  [address] [id number] [phone]    Schedule an appointment as soon as possible for a visit   evaluation of diaphragmatic hernia         Medication List      New Prescriptions    sucralfate 1 g tablet  Commonly known as:  CARAFATE  Take 1 tablet by mouth 4 (Four) Times a Day.  Replaces:  sucralfate 1 GM/10ML suspension        Stop    sucralfate 1 GM/10ML suspension  Commonly known as:  CARAFATE  Replaced by:  sucralfate 1 g tablet          Latest Documented Vital Signs:  As of 14:04  BP- 130/77 HR- 79 Temp- 98.2 °F (36.8 °C) (Tympanic) O2 sat- 93%    --  Documentation assistance provided by alesia Hoffman for Dr. Vinnie Ross.  Information recorded by the scribe was done at my direction and has been verified and validated by me.     Dalton Khan  03/11/20 3608       Vinnie Ross MD  03/11/20 [id number]

## 2020-03-11 NOTE — ED TRIAGE NOTES
Ems reports pt called for chest pain that she has had for two weeks. Pt has had abdominal pain for 2 months. Pt was given 1 nitro per ems and reports that helped her pain for a little bit. Pt was also given Asprin

## 2022-07-19 NOTE — TELEPHONE ENCOUNTER
Called pt at the number listed and is states it is not a valid number.  Also checked with the pt's pharmacy and they have the same number.   Called pt's daughter listed as emergency contact and advised per Dr Baez that the path from the egd that was done last month showed an erosion and min inflammation in the stomach but no evidence of h pylori.We can discuss in more detail if she wants to f/u in office. Pt's daughter verb understanding    Odomzo Counseling- I discussed with the patient the risks of Odomzo including but not limited to nausea, vomiting, diarrhea, constipation, weight loss, changes in the sense of taste, decreased appetite, muscle spasms, and hair loss.  The patient verbalized understanding of the proper use and possible adverse effects of Odomzo.  All of the patient's questions and concerns were addressed.

## 2022-12-20 ENCOUNTER — HOSPITAL ENCOUNTER (INPATIENT)
Facility: HOSPITAL | Age: 87
LOS: 9 days | Discharge: NURSING, REHAB OR LONG-TERM CARE | DRG: 871 | End: 2022-12-29
Attending: EMERGENCY MEDICINE | Admitting: INTERNAL MEDICINE
Payer: MEDICARE

## 2022-12-20 ENCOUNTER — APPOINTMENT (OUTPATIENT)
Dept: GENERAL RADIOLOGY | Facility: HOSPITAL | Age: 87
DRG: 871 | End: 2022-12-20
Payer: MEDICARE

## 2022-12-20 DIAGNOSIS — I48.91 ATRIAL FIBRILLATION WITH RVR: ICD-10-CM

## 2022-12-20 DIAGNOSIS — G89.4 CHRONIC PAIN SYNDROME: Chronic | ICD-10-CM

## 2022-12-20 DIAGNOSIS — F11.20 UNCOMPLICATED OPIOID DEPENDENCE: Chronic | ICD-10-CM

## 2022-12-20 DIAGNOSIS — J18.9 PNEUMONIA DUE TO INFECTIOUS ORGANISM, UNSPECIFIED LATERALITY, UNSPECIFIED PART OF LUNG: Primary | ICD-10-CM

## 2022-12-20 PROBLEM — A41.9 SEPSIS (HCC): Status: ACTIVE | Noted: 2022-12-20

## 2022-12-20 LAB
ALBUMIN SERPL-MCNC: 3.3 G/DL (ref 3.5–5.2)
ALBUMIN/GLOB SERPL: 1.2 G/DL
ALP SERPL-CCNC: 52 U/L (ref 39–117)
ALT SERPL W P-5'-P-CCNC: 17 U/L (ref 1–33)
ANION GAP SERPL CALCULATED.3IONS-SCNC: 13.7 MMOL/L (ref 5–15)
AST SERPL-CCNC: 16 U/L (ref 1–32)
B PARAPERT DNA SPEC QL NAA+PROBE: NOT DETECTED
B PERT DNA SPEC QL NAA+PROBE: NOT DETECTED
BASOPHILS # BLD AUTO: 0.03 10*3/MM3 (ref 0–0.2)
BASOPHILS NFR BLD AUTO: 0.2 % (ref 0–1.5)
BILIRUB SERPL-MCNC: 0.9 MG/DL (ref 0–1.2)
BUN SERPL-MCNC: 14 MG/DL (ref 8–23)
BUN/CREAT SERPL: 18.9 (ref 7–25)
C PNEUM DNA NPH QL NAA+NON-PROBE: NOT DETECTED
CALCIUM SPEC-SCNC: 9.5 MG/DL (ref 8.6–10.5)
CHLORIDE SERPL-SCNC: 100 MMOL/L (ref 98–107)
CO2 SERPL-SCNC: 24.3 MMOL/L (ref 22–29)
CREAT SERPL-MCNC: 0.74 MG/DL (ref 0.57–1)
D-LACTATE SERPL-SCNC: 2 MMOL/L (ref 0.5–2)
DEPRECATED RDW RBC AUTO: 49.8 FL (ref 37–54)
EGFRCR SERPLBLD CKD-EPI 2021: 78.9 ML/MIN/1.73
EOSINOPHIL # BLD AUTO: 0.01 10*3/MM3 (ref 0–0.4)
EOSINOPHIL NFR BLD AUTO: 0.1 % (ref 0.3–6.2)
ERYTHROCYTE [DISTWIDTH] IN BLOOD BY AUTOMATED COUNT: 13.4 % (ref 12.3–15.4)
FLUAV SUBTYP SPEC NAA+PROBE: NOT DETECTED
FLUBV RNA ISLT QL NAA+PROBE: NOT DETECTED
GLOBULIN UR ELPH-MCNC: 2.7 GM/DL
GLUCOSE SERPL-MCNC: 109 MG/DL (ref 65–99)
HADV DNA SPEC NAA+PROBE: NOT DETECTED
HCOV 229E RNA SPEC QL NAA+PROBE: NOT DETECTED
HCOV HKU1 RNA SPEC QL NAA+PROBE: NOT DETECTED
HCOV NL63 RNA SPEC QL NAA+PROBE: NOT DETECTED
HCOV OC43 RNA SPEC QL NAA+PROBE: NOT DETECTED
HCT VFR BLD AUTO: 37.6 % (ref 34–46.6)
HGB BLD-MCNC: 13.1 G/DL (ref 12–15.9)
HMPV RNA NPH QL NAA+NON-PROBE: NOT DETECTED
HPIV1 RNA ISLT QL NAA+PROBE: NOT DETECTED
HPIV2 RNA SPEC QL NAA+PROBE: NOT DETECTED
HPIV3 RNA NPH QL NAA+PROBE: NOT DETECTED
HPIV4 P GENE NPH QL NAA+PROBE: NOT DETECTED
IMM GRANULOCYTES # BLD AUTO: 0.18 10*3/MM3 (ref 0–0.05)
IMM GRANULOCYTES NFR BLD AUTO: 1.3 % (ref 0–0.5)
LYMPHOCYTES # BLD AUTO: 1.24 10*3/MM3 (ref 0.7–3.1)
LYMPHOCYTES NFR BLD AUTO: 9.3 % (ref 19.6–45.3)
M PNEUMO IGG SER IA-ACNC: NOT DETECTED
MAGNESIUM SERPL-MCNC: 1.6 MG/DL (ref 1.6–2.4)
MCH RBC QN AUTO: 34.4 PG (ref 26.6–33)
MCHC RBC AUTO-ENTMCNC: 34.8 G/DL (ref 31.5–35.7)
MCV RBC AUTO: 98.7 FL (ref 79–97)
MONOCYTES # BLD AUTO: 1.43 10*3/MM3 (ref 0.1–0.9)
MONOCYTES NFR BLD AUTO: 10.7 % (ref 5–12)
NEUTROPHILS NFR BLD AUTO: 10.51 10*3/MM3 (ref 1.7–7)
NEUTROPHILS NFR BLD AUTO: 78.4 % (ref 42.7–76)
NRBC BLD AUTO-RTO: 0 /100 WBC (ref 0–0.2)
NT-PROBNP SERPL-MCNC: 3121 PG/ML (ref 0–1800)
PLATELET # BLD AUTO: 138 10*3/MM3 (ref 140–450)
PMV BLD AUTO: 12.4 FL (ref 6–12)
POTASSIUM SERPL-SCNC: 2.9 MMOL/L (ref 3.5–5.2)
PROCALCITONIN SERPL-MCNC: 0.67 NG/ML (ref 0–0.25)
PROT SERPL-MCNC: 6 G/DL (ref 6–8.5)
QT INTERVAL: 326 MS
RBC # BLD AUTO: 3.81 10*6/MM3 (ref 3.77–5.28)
RHINOVIRUS RNA SPEC NAA+PROBE: NOT DETECTED
RSV RNA NPH QL NAA+NON-PROBE: NOT DETECTED
SARS-COV-2 RNA NPH QL NAA+NON-PROBE: NOT DETECTED
SODIUM SERPL-SCNC: 138 MMOL/L (ref 136–145)
TROPONIN T SERPL-MCNC: 0.02 NG/ML (ref 0–0.03)
TROPONIN T SERPL-MCNC: 0.03 NG/ML (ref 0–0.03)
WBC NRBC COR # BLD: 13.4 10*3/MM3 (ref 3.4–10.8)

## 2022-12-20 PROCEDURE — 87150 DNA/RNA AMPLIFIED PROBE: CPT | Performed by: EMERGENCY MEDICINE

## 2022-12-20 PROCEDURE — 83880 ASSAY OF NATRIURETIC PEPTIDE: CPT | Performed by: EMERGENCY MEDICINE

## 2022-12-20 PROCEDURE — 87040 BLOOD CULTURE FOR BACTERIA: CPT | Performed by: EMERGENCY MEDICINE

## 2022-12-20 PROCEDURE — 93005 ELECTROCARDIOGRAM TRACING: CPT

## 2022-12-20 PROCEDURE — 71045 X-RAY EXAM CHEST 1 VIEW: CPT

## 2022-12-20 PROCEDURE — 80053 COMPREHEN METABOLIC PANEL: CPT | Performed by: EMERGENCY MEDICINE

## 2022-12-20 PROCEDURE — 25010000002 ENOXAPARIN PER 10 MG: Performed by: INTERNAL MEDICINE

## 2022-12-20 PROCEDURE — 84484 ASSAY OF TROPONIN QUANT: CPT | Performed by: EMERGENCY MEDICINE

## 2022-12-20 PROCEDURE — 99285 EMERGENCY DEPT VISIT HI MDM: CPT

## 2022-12-20 PROCEDURE — 25010000002 AZITHROMYCIN PER 500 MG: Performed by: INTERNAL MEDICINE

## 2022-12-20 PROCEDURE — 83605 ASSAY OF LACTIC ACID: CPT | Performed by: EMERGENCY MEDICINE

## 2022-12-20 PROCEDURE — 85025 COMPLETE CBC W/AUTO DIFF WBC: CPT | Performed by: EMERGENCY MEDICINE

## 2022-12-20 PROCEDURE — 25010000002 CEFTRIAXONE PER 250 MG: Performed by: EMERGENCY MEDICINE

## 2022-12-20 PROCEDURE — 83735 ASSAY OF MAGNESIUM: CPT | Performed by: EMERGENCY MEDICINE

## 2022-12-20 PROCEDURE — 93010 ELECTROCARDIOGRAM REPORT: CPT | Performed by: INTERNAL MEDICINE

## 2022-12-20 PROCEDURE — 36415 COLL VENOUS BLD VENIPUNCTURE: CPT | Performed by: EMERGENCY MEDICINE

## 2022-12-20 PROCEDURE — 0202U NFCT DS 22 TRGT SARS-COV-2: CPT | Performed by: EMERGENCY MEDICINE

## 2022-12-20 PROCEDURE — 87186 SC STD MICRODIL/AGAR DIL: CPT | Performed by: EMERGENCY MEDICINE

## 2022-12-20 PROCEDURE — 87077 CULTURE AEROBIC IDENTIFY: CPT | Performed by: EMERGENCY MEDICINE

## 2022-12-20 PROCEDURE — 84145 PROCALCITONIN (PCT): CPT | Performed by: EMERGENCY MEDICINE

## 2022-12-20 PROCEDURE — 3E03329 INTRODUCTION OF OTHER ANTI-INFECTIVE INTO PERIPHERAL VEIN, PERCUTANEOUS APPROACH: ICD-10-PCS | Performed by: HOSPITALIST

## 2022-12-20 PROCEDURE — 93005 ELECTROCARDIOGRAM TRACING: CPT | Performed by: EMERGENCY MEDICINE

## 2022-12-20 RX ORDER — BUDESONIDE AND FORMOTEROL FUMARATE DIHYDRATE 160; 4.5 UG/1; UG/1
2 AEROSOL RESPIRATORY (INHALATION)
Status: DISCONTINUED | OUTPATIENT
Start: 2022-12-21 | End: 2022-12-29 | Stop reason: HOSPADM

## 2022-12-20 RX ORDER — POTASSIUM CHLORIDE 750 MG/1
40 TABLET, FILM COATED, EXTENDED RELEASE ORAL DAILY
Status: DISCONTINUED | OUTPATIENT
Start: 2022-12-21 | End: 2022-12-22

## 2022-12-20 RX ORDER — ENOXAPARIN SODIUM 100 MG/ML
40 INJECTION SUBCUTANEOUS EVERY 24 HOURS
Status: DISCONTINUED | OUTPATIENT
Start: 2022-12-20 | End: 2022-12-21

## 2022-12-20 RX ORDER — AMLODIPINE BESYLATE 10 MG/1
10 TABLET ORAL EVERY OTHER DAY
Status: DISCONTINUED | OUTPATIENT
Start: 2022-12-20 | End: 2022-12-21

## 2022-12-20 RX ORDER — POTASSIUM CHLORIDE 1.5 G/1.77G
40 POWDER, FOR SOLUTION ORAL AS NEEDED
Status: DISCONTINUED | OUTPATIENT
Start: 2022-12-20 | End: 2022-12-29 | Stop reason: HOSPADM

## 2022-12-20 RX ORDER — POTASSIUM CHLORIDE 750 MG/1
40 TABLET, FILM COATED, EXTENDED RELEASE ORAL AS NEEDED
Status: DISCONTINUED | OUTPATIENT
Start: 2022-12-20 | End: 2022-12-29 | Stop reason: HOSPADM

## 2022-12-20 RX ORDER — AMOXICILLIN 250 MG
2 CAPSULE ORAL 2 TIMES DAILY PRN
Status: DISCONTINUED | OUTPATIENT
Start: 2022-12-20 | End: 2022-12-29 | Stop reason: HOSPADM

## 2022-12-20 RX ORDER — SODIUM CHLORIDE 0.9 % (FLUSH) 0.9 %
10 SYRINGE (ML) INJECTION EVERY 12 HOURS SCHEDULED
Status: DISCONTINUED | OUTPATIENT
Start: 2022-12-20 | End: 2022-12-29 | Stop reason: HOSPADM

## 2022-12-20 RX ORDER — ACETAMINOPHEN 160 MG/5ML
650 SOLUTION ORAL EVERY 4 HOURS PRN
Status: DISCONTINUED | OUTPATIENT
Start: 2022-12-20 | End: 2022-12-29 | Stop reason: HOSPADM

## 2022-12-20 RX ORDER — FUROSEMIDE 40 MG/1
40 TABLET ORAL DAILY
Status: DISCONTINUED | OUTPATIENT
Start: 2022-12-21 | End: 2022-12-21

## 2022-12-20 RX ORDER — CHLORDIAZEPOXIDE HYDROCHLORIDE 5 MG/1
5 CAPSULE, GELATIN COATED ORAL 3 TIMES DAILY PRN
Status: DISCONTINUED | OUTPATIENT
Start: 2022-12-20 | End: 2022-12-29 | Stop reason: HOSPADM

## 2022-12-20 RX ORDER — ONDANSETRON 2 MG/ML
4 INJECTION INTRAMUSCULAR; INTRAVENOUS EVERY 6 HOURS PRN
Status: DISCONTINUED | OUTPATIENT
Start: 2022-12-20 | End: 2022-12-29 | Stop reason: HOSPADM

## 2022-12-20 RX ORDER — NITROGLYCERIN 0.4 MG/1
0.4 TABLET SUBLINGUAL
Status: DISCONTINUED | OUTPATIENT
Start: 2022-12-20 | End: 2022-12-29 | Stop reason: HOSPADM

## 2022-12-20 RX ORDER — SUCRALFATE 1 G/1
1 TABLET ORAL 4 TIMES DAILY
Status: DISCONTINUED | OUTPATIENT
Start: 2022-12-21 | End: 2022-12-29 | Stop reason: HOSPADM

## 2022-12-20 RX ORDER — PANTOPRAZOLE SODIUM 40 MG/1
40 TABLET, DELAYED RELEASE ORAL DAILY
Status: DISCONTINUED | OUTPATIENT
Start: 2022-12-21 | End: 2022-12-29 | Stop reason: HOSPADM

## 2022-12-20 RX ORDER — GABAPENTIN 400 MG/1
400 CAPSULE ORAL DAILY
Status: DISCONTINUED | OUTPATIENT
Start: 2022-12-21 | End: 2022-12-29 | Stop reason: HOSPADM

## 2022-12-20 RX ORDER — PAROXETINE HYDROCHLORIDE 20 MG/1
20 TABLET, FILM COATED ORAL EVERY MORNING
Status: DISCONTINUED | OUTPATIENT
Start: 2022-12-21 | End: 2022-12-29 | Stop reason: HOSPADM

## 2022-12-20 RX ORDER — FLUTICASONE PROPIONATE 50 MCG
2 SPRAY, SUSPENSION (ML) NASAL DAILY
Status: DISCONTINUED | OUTPATIENT
Start: 2022-12-21 | End: 2022-12-29 | Stop reason: HOSPADM

## 2022-12-20 RX ORDER — ACETAMINOPHEN 325 MG/1
650 TABLET ORAL EVERY 4 HOURS PRN
Status: DISCONTINUED | OUTPATIENT
Start: 2022-12-20 | End: 2022-12-29 | Stop reason: HOSPADM

## 2022-12-20 RX ORDER — SODIUM CHLORIDE 0.9 % (FLUSH) 0.9 %
10 SYRINGE (ML) INJECTION AS NEEDED
Status: DISCONTINUED | OUTPATIENT
Start: 2022-12-20 | End: 2022-12-29 | Stop reason: HOSPADM

## 2022-12-20 RX ORDER — POTASSIUM CHLORIDE 7.45 MG/ML
10 INJECTION INTRAVENOUS
Status: DISCONTINUED | OUTPATIENT
Start: 2022-12-20 | End: 2022-12-29 | Stop reason: HOSPADM

## 2022-12-20 RX ORDER — POTASSIUM CHLORIDE 750 MG/1
40 TABLET, FILM COATED, EXTENDED RELEASE ORAL ONCE
Status: COMPLETED | OUTPATIENT
Start: 2022-12-20 | End: 2022-12-20

## 2022-12-20 RX ORDER — SODIUM CHLORIDE 9 MG/ML
40 INJECTION, SOLUTION INTRAVENOUS AS NEEDED
Status: DISCONTINUED | OUTPATIENT
Start: 2022-12-20 | End: 2022-12-29 | Stop reason: HOSPADM

## 2022-12-20 RX ORDER — HYDROCODONE BITARTRATE AND ACETAMINOPHEN 5; 325 MG/1; MG/1
1 TABLET ORAL EVERY 4 HOURS PRN
Status: DISCONTINUED | OUTPATIENT
Start: 2022-12-20 | End: 2022-12-29 | Stop reason: HOSPADM

## 2022-12-20 RX ADMIN — Medication 10 ML: at 21:45

## 2022-12-20 RX ADMIN — POTASSIUM CHLORIDE 40 MEQ: 750 TABLET, EXTENDED RELEASE ORAL at 21:44

## 2022-12-20 RX ADMIN — METOPROLOL TARTRATE 5 MG: 1 INJECTION, SOLUTION INTRAVENOUS at 23:31

## 2022-12-20 RX ADMIN — ENOXAPARIN SODIUM 40 MG: 100 INJECTION SUBCUTANEOUS at 21:44

## 2022-12-20 RX ADMIN — HYDROCODONE BITARTRATE AND ACETAMINOPHEN 1 TABLET: 5; 325 TABLET ORAL at 23:31

## 2022-12-20 RX ADMIN — CEFTRIAXONE SODIUM 1 G: 1 INJECTION, POWDER, FOR SOLUTION INTRAMUSCULAR; INTRAVENOUS at 18:30

## 2022-12-20 RX ADMIN — POTASSIUM CHLORIDE 40 MEQ: 750 TABLET, EXTENDED RELEASE ORAL at 18:00

## 2022-12-20 RX ADMIN — METOPROLOL TARTRATE 5 MG: 1 INJECTION, SOLUTION INTRAVENOUS at 21:43

## 2022-12-20 RX ADMIN — METOPROLOL TARTRATE 5 MG: 1 INJECTION, SOLUTION INTRAVENOUS at 17:01

## 2022-12-20 RX ADMIN — AZITHROMYCIN MONOHYDRATE 500 MG: 500 INJECTION, POWDER, LYOPHILIZED, FOR SOLUTION INTRAVENOUS at 23:45

## 2022-12-20 NOTE — ED NOTES
.Nursing report ED to floor  Trang Liu  87 y.o.  female    HPI :   Chief Complaint   Patient presents with    Palpitations       Admitting doctor:   Josh Petersen MD    Admitting diagnosis:   The primary encounter diagnosis was Pneumonia due to infectious organism, unspecified laterality, unspecified part of lung. A diagnosis of Atrial fibrillation with RVR (HCC) was also pertinent to this visit.    Code status:   Current Code Status       Date Active Code Status Order ID Comments User Context       12/20/2022 1818 CPR (Attempt to Resuscitate) [id number]  Vianey French, [facility]        Question Answer    Code Status (Patient has no pulse and is not breathing) CPR (Attempt to Resuscitate)    Medical Interventions (Patient has pulse or is breathing) Full Support                    Allergies:   Cimetidine, Codeine, Doxycycline, Guaifenesin & derivatives, Nitrofuran derivatives, Oxaprozin, Penicillins, and Sulfa antibiotics    Isolation:   No active isolations    Intake and Output    Intake/Output Summary (Last 24 hours) at 12/20/2022 1831  Last data filed at 12/20/2022 1535  Gross per 24 hour   Intake 500 ml   Output --   Net 500 ml       Weight:   There were no vitals filed for this visit.    Most recent vitals:   Vitals:    12/20/22 1534 12/20/22 1548 12/20/22 1706 12/20/22 1816   BP:  143/86     Pulse: (!) 136   95   Resp: 20      Temp: 98.2 °F (36.8 °C)      SpO2: 98%   96%   Height:   167.6 cm (66\")        Active LDAs/IV Access:   Lines, Drains & Airways       Active LDAs       Name Placement date Placement time Site Days    Peripheral IV 12/20/22 Left Antecubital 12/20/22  --  Antecubital  less than 1    Peripheral IV 12/20/22 1640 Anterior;Left Forearm 12/20/22  1640  Forearm  less than 1                    Labs (abnormal labs have a star):   Labs Reviewed   COMPREHENSIVE METABOLIC PANEL - Abnormal; Notable for the following components:       Result Value    Glucose 109 (*)     Potassium 2.9 (*)      Albumin 3.30 (*)     All other components within normal limits    Narrative:     GFR Normal >60  Chronic Kidney Disease <60  Kidney Failure <15    The GFR formula is only valid for adults with stable renal function between ages 18 and 70.   BNP (IN-HOUSE) - Abnormal; Notable for the following components:    proBNP 3,121.0 (*)     All other components within normal limits    Narrative:     Among patients with dyspnea, NT-proBNP is highly sensitive for the detection of acute congestive heart failure. In addition NT-proBNP of <300 pg/ml effectively rules out acute congestive heart failure with 99% negative predictive value.    Results may be falsely decreased if patient taking Biotin.     CBC WITH AUTO DIFFERENTIAL - Abnormal; Notable for the following components:    WBC 13.40 (*)     MCV 98.7 (*)     MCH 34.4 (*)     MPV 12.4 (*)     Platelets 138 (*)     Neutrophil % 78.4 (*)     Lymphocyte % 9.3 (*)     Eosinophil % 0.1 (*)     Immature Grans % 1.3 (*)     Neutrophils, Absolute 10.51 (*)     Monocytes, Absolute 1.43 (*)     Immature Grans, Absolute 0.18 (*)     All other components within normal limits   PROCALCITONIN - Abnormal; Notable for the following components:    Procalcitonin 0.67 (*)     All other components within normal limits    Narrative:     As a Marker for Sepsis (Non-Neonates):    1. <0.5 ng/mL represents a low risk of severe sepsis and/or septic shock.  2. >2 ng/mL represents a high risk of severe sepsis and/or septic shock.    As a Marker for Lower Respiratory Tract Infections that require antibiotic therapy:    PCT on Admission    Antibiotic Therapy       6-12 Hrs later    >0.5                Strongly Recommended  >0.25 - <0.5        Recommended   0.1 - 0.25          Discouraged              Remeasure/reassess PCT  <0.1                Strongly Discouraged     Remeasure/reassess PCT    As 28 day mortality risk marker: \"Change in Procalcitonin Result\" (>80% or <=80%) if Day 0 (or Day 1) and Day 4  values are available. Refer to http://[email or web address]-pct-calculator.com    Change in PCT <=80%  A decrease of PCT levels below or equal to 80% defines a positive change in PCT test result representing a higher risk for 28-day all-cause mortality of patients diagnosed with severe sepsis for septic shock.    Change in PCT >80%  A decrease of PCT levels of more than 80% defines a negative change in PCT result representing a lower risk for 28-day all-cause mortality of patients diagnosed with severe sepsis or septic shock.      RESPIRATORY PANEL PCR W/ COVID-19 (SARS-COV-2) JONO/RAHAT/[facility]/PAD/[facility]/MAD/TARSHA IN-HOUSE, NP SWAB IN UTM/VTP, 3-4 HR TAT - Normal    Narrative:     In the setting of a positive respiratory panel with a viral infection PLUS a negative procalcitonin without other underlying concern for bacterial infection, consider observing off antibiotics or discontinuation of antibiotics and continue supportive care. If the respiratory panel is positive for atypical bacterial infection (Bordetella pertussis, Chlamydophila pneumoniae, or Mycoplasma pneumoniae), consider antibiotic de-escalation to target atypical bacterial infection.   TROPONIN (IN-HOUSE) - Normal    Narrative:     Troponin T Reference Range:  <= 0.03 ng/mL-   Negative for AMI  >0.03 ng/mL-     Abnormal for myocardial necrosis.  Clinicians would have to utilize clinical acumen, EKG, Troponin and serial changes to determine if it is an Acute Myocardial Infarction or myocardial injury due to an underlying chronic condition.       Results may be falsely decreased if patient taking Biotin.     MAGNESIUM - Normal   BLOOD CULTURE   BLOOD CULTURE   RESPIRATORY CULTURE   LEGIONELLA ANTIGEN, URINE   STREP PNEUMO AG, URINE OR CSF   TROPONIN (IN-HOUSE)   LACTIC ACID, PLASMA   CBC AND DIFFERENTIAL    Narrative:     The following orders were created for panel order CBC & Differential.  Procedure                               Abnormality         Status                      ---------                               -----------         ------                     CBC Auto Differential[[id number]]        Abnormal            Final result                 Please view results for these tests on the individual orders.       EKG:   ECG 12 Lead Tachycardia   Preliminary Result   HEART RATE= 131  bpm   RR Interval= 458  ms   KY Interval=   ms   P Horizontal Axis=   deg   P Front Axis=   deg   QRSD Interval= 73  ms   QT Interval= 326  ms   QRS Axis= 83  deg   T Wave Axis= 81  deg   - ABNORMAL ECG -   Atrial fibrillation   Borderline right axis deviation   Anteroseptal infarct, old   ST depression, probably rate related   Electronically Signed By:    Date and Time of Study: 2022-12-20 15:44:36          Meds given in ED:   Medications   sodium chloride 0.9 % flush 10 mL (has no administration in time range)   metoprolol tartrate (LOPRESSOR) injection 5 mg (5 mg Intravenous Given 12/20/22 1701)   cefTRIAXone (ROCEPHIN) 1 g in sodium chloride 0.9 % 100 mL IVPB-VTB (1 g Intravenous New Bag 12/20/22 1830)   sodium chloride 0.9 % flush 10 mL (has no administration in time range)   sodium chloride 0.9 % flush 10 mL (has no administration in time range)   sodium chloride 0.9 % infusion 40 mL (has no administration in time range)   cefTRIAXone (ROCEPHIN) 1 g in sodium chloride 0.9 % 100 mL IVPB-VTB (has no administration in time range)   Pharmacy to Dose enoxaparin (LOVENOX) (has no administration in time range)   nitroglycerin (NITROSTAT) SL tablet 0.4 mg (has no administration in time range)   acetaminophen (TYLENOL) tablet 650 mg (has no administration in time range)     Or   acetaminophen (TYLENOL) 160 MG/5ML solution 650 mg (has no administration in time range)     Or   acetaminophen (TYLENOL) suppository 650 mg (has no administration in time range)   potassium chloride (K-DUR,KLOR-CON) ER tablet 40 mEq (has no administration in time range)     Or   potassium chloride (KLOR-CON) packet 40 mEq (has no  administration in time range)     Or   potassium chloride 10 mEq in 100 mL IVPB (has no administration in time range)   ondansetron (ZOFRAN) injection 4 mg (has no administration in time range)   potassium chloride (K-DUR,KLOR-CON) ER tablet 40 mEq (40 mEq Oral Given 12/20/22 1800)       Imaging results:  No radiology results for the last day    Ambulatory status:   - x1 w/ walker    Social issues:   Social History     Socioeconomic History    Marital status:    Tobacco Use    Smoking status: Never    Smokeless tobacco: Never   Substance and Sexual Activity    Alcohol use: No    Drug use: No    Sexual activity: Defer       NIH Stroke Scale:         My Lopez RN  12/20/22 18:31 EST

## 2022-12-20 NOTE — ED PROVIDER NOTES
EMERGENCY DEPARTMENT ENCOUNTER    Room Number:  21/21  Date of encounter:  12/20/2022  PCP: Nayla Higginbotham APRN  Patient Care Team:  Nayla Higginbotham APRN as PCP - General (Family Medicine)   Historian: Patient, EMS, primary care note    HPI:  Chief Complaint: Palpitations  A complete HPI/ROS/PMH/PSH/SH/FH are unobtainable due to: Patient is a poor historian    Context: Trang Liu is a 87 y.o. female who presents to the ED c/o having palpitations for the last several weeks.  She was seen her primary care doctor office today for palpitations.  She has a history of atrial fibrillation.  She was found to be in A. fib with RVR.  She does report that she has had recent nonproductive cough.  She denies any chest pain or shortness of breath.  She does report some generalized weakness.  She is not able to provide much more significant history.    Prior record review: Normal chest x-ray dated today.  Negative COVID and flu swabs dated today.  Primary care note directing her to the emergency room.    PAST MEDICAL HISTORY  Active Ambulatory Problems     Diagnosis Date Noted   • Upper GI bleed 05/27/2018   • Diastolic dysfunction 05/27/2018   • GERD (gastroesophageal reflux disease) 05/27/2018   • Hypertension 05/27/2018   • Parkinsons (HCC) 05/27/2018   • Rheumatoid arthritis ([facility]) 05/27/2018   • Anemia, posthemorrhagic, acute 05/28/2018   • Gastroesophageal reflux disease 05/29/2018   • Urinary tract infection, site not specified 12/18/2018   • Rheumatoid arthritis ([facility]) 01/02/2019   • Hypokalemia 02/12/2019   • Atrial fibrillation ([facility]) 02/12/2019   • Abdominal pain 02/12/2019   • Anxiety 02/12/2019   • Hypomagnesemia 02/15/2019     Resolved Ambulatory Problems     Diagnosis Date Noted   • No Resolved Ambulatory Problems     Past Medical History:   Diagnosis Date   • Aortic valve insufficiency    • Ataxia    • H/O hernia repair    • History of hip replacement    • Insomnia    • Mitral regurgitation    •  Rotator cuff disorder    • Spastic colon    • Tremor    • Tricuspid valve regurgitation    • UTI (urinary tract infection)    • Wears glasses        The patient qualifies to receive the vaccine, but they have not yet received it.    PAST SURGICAL HISTORY  Past Surgical History:   Procedure Laterality Date   • APPENDECTOMY     • CATARACT EXTRACTION     • ENDOSCOPY N/A 5/30/2018    LA Grade B reflux esophagitis, HH    • HERNIA REPAIR     • HYSTERECTOMY           FAMILY HISTORY  Family History   Problem Relation Age of Onset   • Diabetes Mother    • Arthritis Mother    • Heart failure Mother    • Diabetes Father    • Heart disease Father    • Arthritis Father          SOCIAL HISTORY  Social History     Socioeconomic History   • Marital status:    Tobacco Use   • Smoking status: Never   • Smokeless tobacco: Never   Substance and Sexual Activity   • Alcohol use: No   • Drug use: No   • Sexual activity: Defer         ALLERGIES  Cimetidine, Codeine, Doxycycline, Guaifenesin & derivatives, Nitrofuran derivatives, Oxaprozin, Penicillins, and Sulfa antibiotics        REVIEW OF SYSTEMS  Review of Systems   No chest pain, no shortness of breath, no syncope, no, no nausea, no vomiting, fever, no chills, positive palpitations, positive cough  All systems reviewed and negative except for those discussed in HPI.       PHYSICAL EXAM    I have reviewed the triage vital signs and nursing notes.    ED Triage Vitals   Temp Heart Rate Resp BP SpO2   12/20/22 1534 12/20/22 1534 12/20/22 1534 12/20/22 1548 12/20/22 1534   98.2 °F (36.8 °C) (!) 136 20 143/86 98 %      Temp src Heart Rate Source Patient Position BP Location FiO2 (%)   -- -- -- -- --              Physical Exam  GENERAL: Awake, alert, no acute distress  SKIN: Warm, dry  HENT: Normocephalic, atraumatic  EYES: no scleral icterus  CV: Tachycardic rhythm, regular rate  RESPIRATORY: normal effort, lungs clear  ABDOMEN: soft, nontender, nondistended  MUSCULOSKELETAL: no  deformity, no significant edema  NEURO: alert, moves all extremities, follows commands          LAB RESULTS  Recent Results (from the past 24 hour(s))   ECG 12 Lead Tachycardia    Collection Time: 12/20/22  3:44 PM   Result Value Ref Range    QT Interval 326 ms   Comprehensive Metabolic Panel    Collection Time: 12/20/22  4:10 PM    Specimen: Blood   Result Value Ref Range    Glucose 109 (H) 65 - 99 mg/dL    BUN 14 8 - 23 mg/dL    Creatinine 0.74 0.57 - 1.00 mg/dL    Sodium 138 136 - 145 mmol/L    Potassium 2.9 (L) 3.5 - 5.2 mmol/L    Chloride 100 98 - 107 mmol/L    CO2 24.3 22.0 - 29.0 mmol/L    Calcium 9.5 8.6 - 10.5 mg/dL    Total Protein 6.0 6.0 - 8.5 g/dL    Albumin 3.30 (L) 3.50 - 5.20 g/dL    ALT (SGPT) 17 1 - 33 U/L    AST (SGOT) 16 1 - 32 U/L    Alkaline Phosphatase 52 39 - 117 U/L    Total Bilirubin 0.9 0.0 - 1.2 mg/dL    Globulin 2.7 gm/dL    A/G Ratio 1.2 g/dL    BUN/Creatinine Ratio 18.9 7.0 - 25.0    Anion Gap 13.7 5.0 - 15.0 mmol/L    eGFR 78.9 >60.0 mL/min/1.73   BNP    Collection Time: 12/20/22  4:10 PM    Specimen: Blood   Result Value Ref Range    proBNP 3,121.0 (H) 0.0 - 1,800.0 pg/mL   Troponin    Collection Time: 12/20/22  4:10 PM    Specimen: Blood   Result Value Ref Range    Troponin T 0.028 0.000 - 0.030 ng/mL   Magnesium    Collection Time: 12/20/22  4:10 PM    Specimen: Blood   Result Value Ref Range    Magnesium 1.6 1.6 - 2.4 mg/dL   CBC Auto Differential    Collection Time: 12/20/22  4:10 PM    Specimen: Blood   Result Value Ref Range    WBC 13.40 (H) 3.40 - 10.80 10*3/mm3    RBC 3.81 3.77 - 5.28 10*6/mm3    Hemoglobin 13.1 12.0 - 15.9 g/dL    Hematocrit 37.6 34.0 - 46.6 %    MCV 98.7 (H) 79.0 - 97.0 fL    MCH 34.4 (H) 26.6 - 33.0 pg    MCHC 34.8 31.5 - 35.7 g/dL    RDW 13.4 12.3 - 15.4 %    RDW-SD 49.8 37.0 - 54.0 fl    MPV 12.4 (H) 6.0 - 12.0 fL    Platelets 138 (L) 140 - 450 10*3/mm3    Neutrophil % 78.4 (H) 42.7 - 76.0 %    Lymphocyte % 9.3 (L) 19.6 - 45.3 %    Monocyte % 10.7 5.0 -  12.0 %    Eosinophil % 0.1 (L) 0.3 - 6.2 %    Basophil % 0.2 0.0 - 1.5 %    Immature Grans % 1.3 (H) 0.0 - 0.5 %    Neutrophils, Absolute 10.51 (H) 1.70 - 7.00 10*3/mm3    Lymphocytes, Absolute 1.24 0.70 - 3.10 10*3/mm3    Monocytes, Absolute 1.43 (H) 0.10 - 0.90 10*3/mm3    Eosinophils, Absolute 0.01 0.00 - 0.40 10*3/mm3    Basophils, Absolute 0.03 0.00 - 0.20 10*3/mm3    Immature Grans, Absolute 0.18 (H) 0.00 - 0.05 10*3/mm3    nRBC 0.0 0.0 - 0.2 /100 WBC   Respiratory Panel PCR w/COVID-19(SARS-CoV-2) JONO/RAHAT/[facility]/TARSHA In-House, NP Swab in UTM/VTM, 3-4 HR TAT - Swab, Nasopharynx    Collection Time: 12/20/22  4:10 PM    Specimen: Nasopharynx; Swab   Result Value Ref Range    ADENOVIRUS, PCR Not Detected Not Detected    Coronavirus 229E Not Detected Not Detected    Coronavirus HKU1 Not Detected Not Detected    Coronavirus NL63 Not Detected Not Detected    Coronavirus OC43 Not Detected Not Detected    COVID19 Not Detected Not Detected - Ref. Range    Human Metapneumovirus Not Detected Not Detected    Human Rhinovirus/Enterovirus Not Detected Not Detected    Influenza A PCR Not Detected Not Detected    Influenza B PCR Not Detected Not Detected    Parainfluenza Virus 1 Not Detected Not Detected    Parainfluenza Virus 2 Not Detected Not Detected    Parainfluenza Virus 3 Not Detected Not Detected    Parainfluenza Virus 4 Not Detected Not Detected    RSV, PCR Not Detected Not Detected    Bordetella pertussis pcr Not Detected Not Detected    Bordetella parapertussis PCR Not Detected Not Detected    Chlamydophila pneumoniae PCR Not Detected Not Detected    Mycoplasma pneumo by PCR Not Detected Not Detected       Ordered the above labs and independently reviewed the results.        RADIOLOGY  XR Chest 1 View    Result Date: 12/20/2022  XR CHEST 1 VW-  HISTORY:  Palpitations, atrial fibrillation.  COMPARISON:  Chest and left rib radiographs 3/11/2020. CT abdomen and pelvis 3/11/2020  FINDINGS:  A single view of the  chest was obtained. The cardiac silhouette and mediastinal and hilar contours are not significantly changed. There is calcific aortic atherosclerosis. Masslike density with corresponding areas of lucency projecting over the right lung base correspond to a large fat and colon containing hernia on prior CT from , incompletely assessed on the current examination but similar to slightly larger compared to prior radiographs from . This limits evaluation of the right lung base. No definite new focal consolidation is identified. Pleural spaces are clear. There is degenerative disc disease.  This report was finalized on 2022 4:33 PM by Dr. Indigo Heart M.D.      XR Chest 2Vw    Result Date: 2022  REVIEWING YOUR TEST RESULTS IN [facility] IS NOT A SUBSTITUTE FOR DISCUSSING THOSE RESULTS WITH YOUR HEALTH CARE PROVIDER. PLEASE CONTACT YOUR PROVIDER VIA [employer] TO DISCUSS ANY QUESTIONS OR CONCERNS YOU MAY HAVE REGARDING THESE TEST RESULTS.  RADIOLOGY REPORT FACILITY:  [facility] UNIT/AGE/GENDER: DOMONIQUE  OP      AGE:87 Y          SEX:F PATIENT NAME/:  NAVIN HUBERNY SARAVIA    1935 UNIT NUMBER:  [id number] ACCOUNT NUMBER:  [id number] ACCESSION NUMBER:  [id number] 2 VIEWS OF THE CHEST HISTORY: Shortness of breath COMPARISON: 2021 TECHNIQUE: PA and lateral views were performed of the chest FINDINGS:    The cardiomediastinal silhouette is normal. Atherosclerotic calcification affects the thoracic aorta. The thoracic aorta is tortuous as seen before Stable chronic opacity along the right heart border. The lungs are otherwise clear. There is no pleural effusion or pneumothorax.    IMPRESSION: 1.No evidence of acute cardiopulmonary abnormality. No significant interval change Dictated by: Kellen Kohler M.D. Images and Report reviewed and interpreted by: Kellen Kohler M.D. <PS><Electronically signed by: Kellen Kohler M.D.> 2022 1416  D: 2022  1416 T: 12/20/2022 1416      I ordered the above noted radiological studies. Reviewed by me and discussed with radiologist.  See dictation for official radiology interpretation.      PROCEDURES    Critical Care  Performed by: Iglesia Hoffmann MD  Authorized by: Iglesia Hoffmann MD     Critical care provider statement:     Critical care time (minutes): 30-74.    Critical care time was exclusive of:  Separately billable procedures and treating other patients    Critical care was necessary to treat or prevent imminent or life-threatening deterioration of the following conditions:  Circulatory failure    Critical care was time spent personally by me on the following activities:  Development of treatment plan with patient or surrogate, discussions with consultants, evaluation of patient's response to treatment, examination of patient, obtaining history from patient or surrogate, ordering and performing treatments and interventions, ordering and review of laboratory studies, ordering and review of radiographic studies, pulse oximetry, re-evaluation of patient's condition and review of old charts          MEDICATIONS GIVEN IN ER    Medications   sodium chloride 0.9 % flush 10 mL (has no administration in time range)   metoprolol tartrate (LOPRESSOR) injection 5 mg (5 mg Intravenous Given 12/20/22 1701)   cefTRIAXone (ROCEPHIN) 1 g in sodium chloride 0.9 % 100 mL IVPB-VTB (has no administration in time range)   potassium chloride (K-DUR,KLOR-CON) ER tablet 40 mEq (40 mEq Oral Given 12/20/22 1800)         PROGRESS, DATA ANALYSIS, CONSULTS, AND MEDICAL DECISION MAKING    All labs have been independently reviewed by me.  All radiology studies have been reviewed by me and discussed with radiologist dictating the report.   EKG's independently viewed and interpreted by me.  Discussion below represents my analysis of pertinent findings related to patient's condition, differential diagnosis, treatment plan and final  disposition.    Differential diagnosis includes but is not limited to A. fib with RVR, hypokalemia, hypomagnesemia, hyperthyroidism, CHF, pneumonia, viral syndrome.    ED Course as of 12/20/22 1810   Tue Dec 20, 2022   1627 I am giving her IV metoprolol to help slow her rate. [TR]   1720 EKG          EKG time: 1544  Rhythm/Rate: Atrial fibrillation, rate 131  P waves and UT: Absent  QRS, axis: Narrow QRS, normal axis  ST and T waves: No acute    Interpreted Contemporaneously by me, independently viewed  New atrial fibrillation changed compared to prior 3/11/2020   [TR]   1746 I spoke with the patient at the bedside.  She reports she has had a productive cough for the last several days.  She reports generalized weakness.  She reports she has been so weak she has not been able to get around and she has not been taking her medications.  Clinically I suspect she has pneumonia.  Her chest x-ray does not reveal pneumonia although the exam is limited.  Given that she is in A. fib with RVR, has generalized weakness, is sick enough that she is not taking her medications, and appeals frail and ill, plan to admit her to the hospital for further management.  She is agreeable.  Laboratory evaluation shows negative respiratory viral panel.  She does have a mildly low potassium at 2.9.  She has an elevated BNP which goes along with her being in A. fib with RVR.  Her white blood cell count is elevated with a left shift. [TR]   1809 Speaking with Vianey with [facility].  Will admit to Dr. Petersen. [TR]      ED Course User Index  [TR] Iglesia Hoffmann MD           PPE: The patient wore a mask and I wore an N95 mask throughout the entire patient encounter.       AS OF 18:10 EST VITALS:    BP - 143/86  HR - (!) 136  TEMP - 98.2 °F (36.8 °C)  O2 SATS - 98%        DIAGNOSIS  Final diagnoses:   Pneumonia due to infectious organism, unspecified laterality, unspecified part of lung   Atrial fibrillation with RVR (HCC)         DISPOSITION  ED  Disposition     ED Disposition   Decision to Admit    Condition   --    Comment   Level of Care: Telemetry [5]   Diagnosis: Pneumonia due to infectious organism, unspecified laterality, unspecified part of lung [[id number]]   Admitting Physician: JAKY LLANES [[id number]]   Attending Physician: JAKY LLANES [[id number]]                  Note Disclaimer: At [facility], we believe that sharing information builds trust and better relationships. You are receiving this note because you recently visited [facility]. It is possible you will see health information before a provider has talked with you about it. This kind of information can be easy to misunderstand. To help you fully understand what it means for your health, we urge you to discuss this note with your provider.       Iglesia Hoffmann MD  12/20/22 4857

## 2022-12-21 ENCOUNTER — APPOINTMENT (OUTPATIENT)
Dept: CARDIOLOGY | Facility: HOSPITAL | Age: 87
DRG: 871 | End: 2022-12-21
Payer: MEDICARE

## 2022-12-21 PROBLEM — J96.01 ACUTE RESPIRATORY FAILURE WITH HYPOXIA (HCC): Status: ACTIVE | Noted: 2022-12-21

## 2022-12-21 PROBLEM — R78.81 BACTEREMIA: Status: ACTIVE | Noted: 2022-12-21

## 2022-12-21 LAB
ALBUMIN SERPL-MCNC: 3.1 G/DL (ref 3.5–5.2)
ALBUMIN/GLOB SERPL: 1.2 G/DL
ALP SERPL-CCNC: 51 U/L (ref 39–117)
ALT SERPL W P-5'-P-CCNC: 6 U/L (ref 1–33)
ANION GAP SERPL CALCULATED.3IONS-SCNC: 9 MMOL/L (ref 5–15)
AORTIC DIMENSIONLESS INDEX: 0.3 (DI)
AST SERPL-CCNC: 22 U/L (ref 1–32)
BACTERIA BLD CULT: ABNORMAL
BACTERIA UR QL AUTO: ABNORMAL /HPF
BASOPHILS # BLD AUTO: 0.03 10*3/MM3 (ref 0–0.2)
BASOPHILS NFR BLD AUTO: 0.2 % (ref 0–1.5)
BH CV ECHO MEAS - AO MAX PG: 22.9 MMHG
BH CV ECHO MEAS - AO MEAN PG: 15.2 MMHG
BH CV ECHO MEAS - AO V2 MAX: 239.1 CM/SEC
BH CV ECHO MEAS - AO V2 VTI: 44.6 CM
BH CV ECHO MEAS - AVA(I,D): 0.72 CM2
BH CV ECHO MEAS - EDV(MOD-SP2): 72 ML
BH CV ECHO MEAS - EDV(MOD-SP4): 70 ML
BH CV ECHO MEAS - EF(MOD-BP): 58.8 %
BH CV ECHO MEAS - EF(MOD-SP2): 58.3 %
BH CV ECHO MEAS - EF(MOD-SP4): 58.6 %
BH CV ECHO MEAS - ESV(MOD-SP2): 30 ML
BH CV ECHO MEAS - ESV(MOD-SP4): 29 ML
BH CV ECHO MEAS - LAT PEAK E' VEL: 9.5 CM/SEC
BH CV ECHO MEAS - LV DIASTOLIC VOL/BSA (35-75): 40 CM2
BH CV ECHO MEAS - LV MAX PG: 2.3 MMHG
BH CV ECHO MEAS - LV MEAN PG: 1.28 MMHG
BH CV ECHO MEAS - LV SYSTOLIC VOL/BSA (12-30): 16.6 CM2
BH CV ECHO MEAS - LV V1 MAX: 75.9 CM/SEC
BH CV ECHO MEAS - LV V1 VTI: 12 CM
BH CV ECHO MEAS - LVOT AREA: 2.7 CM2
BH CV ECHO MEAS - LVOT DIAM: 1.85 CM
BH CV ECHO MEAS - MED PEAK E' VEL: 11.1 CM/SEC
BH CV ECHO MEAS - MV DEC SLOPE: 488.6 CM/SEC2
BH CV ECHO MEAS - MV DEC TIME: 110 MSEC
BH CV ECHO MEAS - MV E MAX VEL: 106.3 CM/SEC
BH CV ECHO MEAS - MV MAX PG: 5.3 MMHG
BH CV ECHO MEAS - MV MEAN PG: 2.05 MMHG
BH CV ECHO MEAS - MV P1/2T: 67.7 MSEC
BH CV ECHO MEAS - MV V2 VTI: 25.9 CM
BH CV ECHO MEAS - MVA(P1/2T): 3.2 CM2
BH CV ECHO MEAS - MVA(VTI): 1.24 CM2
BH CV ECHO MEAS - PA ACC TIME: 0.11 SEC
BH CV ECHO MEAS - PA PR(ACCEL): 29.1 MMHG
BH CV ECHO MEAS - PA V2 MAX: 75.2 CM/SEC
BH CV ECHO MEAS - PULM DIAS VEL: 41.4 CM/SEC
BH CV ECHO MEAS - PULM S/D: 1.15
BH CV ECHO MEAS - PULM SYS VEL: 47.5 CM/SEC
BH CV ECHO MEAS - QP/QS: 1.13
BH CV ECHO MEAS - RAP SYSTOLE: 8 MMHG
BH CV ECHO MEAS - RV MAX PG: 1.34 MMHG
BH CV ECHO MEAS - RV V1 MAX: 57.8 CM/SEC
BH CV ECHO MEAS - RV V1 VTI: 10.3 CM
BH CV ECHO MEAS - RVOT DIAM: 2.12 CM
BH CV ECHO MEAS - RVSP: 29.2 MMHG
BH CV ECHO MEAS - SI(MOD-SP2): 24 ML/M2
BH CV ECHO MEAS - SI(MOD-SP4): 23.4 ML/M2
BH CV ECHO MEAS - SV(LVOT): 32.2 ML
BH CV ECHO MEAS - SV(MOD-SP2): 42 ML
BH CV ECHO MEAS - SV(MOD-SP4): 41 ML
BH CV ECHO MEAS - SV(RVOT): 36.4 ML
BH CV ECHO MEAS - TAPSE (>1.6): 1.23 CM
BH CV ECHO MEAS - TR MAX PG: 21.2 MMHG
BH CV ECHO MEAS - TR MAX VEL: 230.2 CM/SEC
BH CV ECHO MEASUREMENTS AVERAGE E/E' RATIO: 10.32
BH CV XLRA - RV BASE: 2.7 CM
BH CV XLRA - RV LENGTH: 6.2 CM
BH CV XLRA - RV MID: 1.89 CM
BH CV XLRA - TDI S': 13.4 CM/SEC
BILIRUB SERPL-MCNC: 0.6 MG/DL (ref 0–1.2)
BILIRUB UR QL STRIP: NEGATIVE
BOTTLE TYPE: ABNORMAL
BUN SERPL-MCNC: 15 MG/DL (ref 8–23)
BUN/CREAT SERPL: 19 (ref 7–25)
CALCIUM SPEC-SCNC: 8.9 MG/DL (ref 8.6–10.5)
CHLORIDE SERPL-SCNC: 105 MMOL/L (ref 98–107)
CLARITY UR: CLEAR
CO2 SERPL-SCNC: 23 MMOL/L (ref 22–29)
COLOR UR: YELLOW
CREAT SERPL-MCNC: 0.79 MG/DL (ref 0.57–1)
DEPRECATED RDW RBC AUTO: 52.7 FL (ref 37–54)
EGFRCR SERPLBLD CKD-EPI 2021: 73 ML/MIN/1.73
EOSINOPHIL # BLD AUTO: 0.03 10*3/MM3 (ref 0–0.4)
EOSINOPHIL NFR BLD AUTO: 0.2 % (ref 0.3–6.2)
ERYTHROCYTE [DISTWIDTH] IN BLOOD BY AUTOMATED COUNT: 14 % (ref 12.3–15.4)
GLOBULIN UR ELPH-MCNC: 2.5 GM/DL
GLUCOSE SERPL-MCNC: 116 MG/DL (ref 65–99)
GLUCOSE UR STRIP-MCNC: NEGATIVE MG/DL
HCT VFR BLD AUTO: 33.8 % (ref 34–46.6)
HGB BLD-MCNC: 11.3 G/DL (ref 12–15.9)
HGB UR QL STRIP.AUTO: ABNORMAL
HYALINE CASTS UR QL AUTO: ABNORMAL /LPF
IMM GRANULOCYTES # BLD AUTO: 0.11 10*3/MM3 (ref 0–0.05)
IMM GRANULOCYTES NFR BLD AUTO: 0.8 % (ref 0–0.5)
KETONES UR QL STRIP: NEGATIVE
L PNEUMO1 AG UR QL IA: NEGATIVE
LEFT ATRIUM VOLUME INDEX: 32.7 ML/M2
LEUKOCYTE ESTERASE UR QL STRIP.AUTO: ABNORMAL
LYMPHOCYTES # BLD AUTO: 1.67 10*3/MM3 (ref 0.7–3.1)
LYMPHOCYTES NFR BLD AUTO: 12.3 % (ref 19.6–45.3)
MAXIMAL PREDICTED HEART RATE: 134 BPM
MCH RBC QN AUTO: 34 PG (ref 26.6–33)
MCHC RBC AUTO-ENTMCNC: 33.4 G/DL (ref 31.5–35.7)
MCV RBC AUTO: 101.8 FL (ref 79–97)
MONOCYTES # BLD AUTO: 1.74 10*3/MM3 (ref 0.1–0.9)
MONOCYTES NFR BLD AUTO: 12.8 % (ref 5–12)
NEUTROPHILS NFR BLD AUTO: 10.04 10*3/MM3 (ref 1.7–7)
NEUTROPHILS NFR BLD AUTO: 73.7 % (ref 42.7–76)
NITRITE UR QL STRIP: POSITIVE
NRBC BLD AUTO-RTO: 0.1 /100 WBC (ref 0–0.2)
PH UR STRIP.AUTO: 6 [PH] (ref 5–8)
PLATELET # BLD AUTO: 143 10*3/MM3 (ref 140–450)
PMV BLD AUTO: 12.7 FL (ref 6–12)
POTASSIUM SERPL-SCNC: 4.4 MMOL/L (ref 3.5–5.2)
PROT SERPL-MCNC: 5.6 G/DL (ref 6–8.5)
PROT UR QL STRIP: ABNORMAL
QT INTERVAL: 317 MS
RBC # BLD AUTO: 3.32 10*6/MM3 (ref 3.77–5.28)
RBC # UR STRIP: ABNORMAL /HPF
REF LAB TEST METHOD: ABNORMAL
S PNEUM AG SPEC QL LA: NEGATIVE
SINUS: 2.7 CM
SODIUM SERPL-SCNC: 137 MMOL/L (ref 136–145)
SP GR UR STRIP: 1.01 (ref 1–1.03)
SQUAMOUS #/AREA URNS HPF: ABNORMAL /HPF
STRESS TARGET HR: 114 BPM
TROPONIN T SERPL-MCNC: 0.02 NG/ML (ref 0–0.03)
TROPONIN T SERPL-MCNC: 0.03 NG/ML (ref 0–0.03)
UROBILINOGEN UR QL STRIP: ABNORMAL
WBC # UR STRIP: ABNORMAL /HPF
WBC NRBC COR # BLD: 13.62 10*3/MM3 (ref 3.4–10.8)
WHOLE BLOOD HOLD SPECIMEN: NORMAL

## 2022-12-21 PROCEDURE — 80053 COMPREHEN METABOLIC PANEL: CPT | Performed by: INTERNAL MEDICINE

## 2022-12-21 PROCEDURE — 93005 ELECTROCARDIOGRAM TRACING: CPT | Performed by: INTERNAL MEDICINE

## 2022-12-21 PROCEDURE — 94761 N-INVAS EAR/PLS OXIMETRY MLT: CPT

## 2022-12-21 PROCEDURE — 87899 AGENT NOS ASSAY W/OPTIC: CPT | Performed by: NURSE PRACTITIONER

## 2022-12-21 PROCEDURE — 93306 TTE W/DOPPLER COMPLETE: CPT

## 2022-12-21 PROCEDURE — 25010000002 CEFTRIAXONE PER 250 MG: Performed by: HOSPITALIST

## 2022-12-21 PROCEDURE — 94799 UNLISTED PULMONARY SVC/PX: CPT

## 2022-12-21 PROCEDURE — 94640 AIRWAY INHALATION TREATMENT: CPT

## 2022-12-21 PROCEDURE — 25010000002 FUROSEMIDE PER 20 MG: Performed by: INTERNAL MEDICINE

## 2022-12-21 PROCEDURE — 99222 1ST HOSP IP/OBS MODERATE 55: CPT | Performed by: INTERNAL MEDICINE

## 2022-12-21 PROCEDURE — 84484 ASSAY OF TROPONIN QUANT: CPT | Performed by: INTERNAL MEDICINE

## 2022-12-21 PROCEDURE — 85025 COMPLETE CBC W/AUTO DIFF WBC: CPT | Performed by: INTERNAL MEDICINE

## 2022-12-21 PROCEDURE — 25010000002 ONDANSETRON PER 1 MG: Performed by: NURSE PRACTITIONER

## 2022-12-21 PROCEDURE — 25010000002 LEVOFLOXACIN PER 250 MG: Performed by: HOSPITALIST

## 2022-12-21 PROCEDURE — 81001 URINALYSIS AUTO W/SCOPE: CPT | Performed by: INTERNAL MEDICINE

## 2022-12-21 PROCEDURE — 94760 N-INVAS EAR/PLS OXIMETRY 1: CPT

## 2022-12-21 PROCEDURE — 87040 BLOOD CULTURE FOR BACTERIA: CPT | Performed by: NURSE PRACTITIONER

## 2022-12-21 PROCEDURE — 87086 URINE CULTURE/COLONY COUNT: CPT | Performed by: NURSE PRACTITIONER

## 2022-12-21 PROCEDURE — 94664 DEMO&/EVAL PT USE INHALER: CPT

## 2022-12-21 PROCEDURE — 25010000002 ENOXAPARIN PER 10 MG: Performed by: INTERNAL MEDICINE

## 2022-12-21 PROCEDURE — 93306 TTE W/DOPPLER COMPLETE: CPT | Performed by: INTERNAL MEDICINE

## 2022-12-21 PROCEDURE — 93010 ELECTROCARDIOGRAM REPORT: CPT | Performed by: INTERNAL MEDICINE

## 2022-12-21 PROCEDURE — 87449 NOS EACH ORGANISM AG IA: CPT | Performed by: NURSE PRACTITIONER

## 2022-12-21 PROCEDURE — 25010000002 DIGOXIN PER 500 MCG: Performed by: INTERNAL MEDICINE

## 2022-12-21 PROCEDURE — 25010000002 DIGOXIN PER 500 MCG: Performed by: NURSE PRACTITIONER

## 2022-12-21 PROCEDURE — 25010000002 MORPHINE PER 10 MG: Performed by: NURSE PRACTITIONER

## 2022-12-21 RX ORDER — MORPHINE SULFATE 2 MG/ML
1 INJECTION, SOLUTION INTRAMUSCULAR; INTRAVENOUS ONCE
Status: COMPLETED | OUTPATIENT
Start: 2022-12-21 | End: 2022-12-21

## 2022-12-21 RX ORDER — FUROSEMIDE 10 MG/ML
40 INJECTION INTRAMUSCULAR; INTRAVENOUS EVERY 12 HOURS
Status: COMPLETED | OUTPATIENT
Start: 2022-12-21 | End: 2022-12-23

## 2022-12-21 RX ORDER — DIGOXIN 0.25 MG/ML
250 INJECTION INTRAMUSCULAR; INTRAVENOUS ONCE
Status: COMPLETED | OUTPATIENT
Start: 2022-12-21 | End: 2022-12-21

## 2022-12-21 RX ORDER — POTASSIUM CHLORIDE 1.5 G/1.77G
20 POWDER, FOR SOLUTION ORAL DAILY
Status: DISCONTINUED | OUTPATIENT
Start: 2022-12-21 | End: 2022-12-22

## 2022-12-21 RX ORDER — ENOXAPARIN SODIUM 100 MG/ML
1 INJECTION SUBCUTANEOUS EVERY 12 HOURS
Status: DISCONTINUED | OUTPATIENT
Start: 2022-12-21 | End: 2022-12-24

## 2022-12-21 RX ORDER — LEVOFLOXACIN 5 MG/ML
750 INJECTION, SOLUTION INTRAVENOUS ONCE
Status: COMPLETED | OUTPATIENT
Start: 2022-12-21 | End: 2022-12-21

## 2022-12-21 RX ORDER — ASPIRIN 81 MG/1
81 TABLET ORAL DAILY
Status: DISCONTINUED | OUTPATIENT
Start: 2022-12-21 | End: 2022-12-24

## 2022-12-21 RX ORDER — DIGOXIN 0.25 MG/ML
125 INJECTION INTRAMUSCULAR; INTRAVENOUS ONCE
Status: COMPLETED | OUTPATIENT
Start: 2022-12-21 | End: 2022-12-21

## 2022-12-21 RX ADMIN — ENOXAPARIN SODIUM 70 MG: 100 INJECTION SUBCUTANEOUS at 15:40

## 2022-12-21 RX ADMIN — PAROXETINE HYDROCHLORIDE HEMIHYDRATE 20 MG: 20 TABLET, FILM COATED ORAL at 06:44

## 2022-12-21 RX ADMIN — POTASSIUM CHLORIDE 40 MEQ: 750 TABLET, EXTENDED RELEASE ORAL at 10:27

## 2022-12-21 RX ADMIN — CARBIDOPA AND LEVODOPA 2 TABLET: 25; 100 TABLET ORAL at 20:43

## 2022-12-21 RX ADMIN — FLUTICASONE PROPIONATE 2 SPRAY: 50 SPRAY, METERED NASAL at 10:28

## 2022-12-21 RX ADMIN — MORPHINE SULFATE 1 MG: 2 INJECTION, SOLUTION INTRAMUSCULAR; INTRAVENOUS at 02:11

## 2022-12-21 RX ADMIN — SUCRALFATE 1 G: 1 TABLET ORAL at 18:27

## 2022-12-21 RX ADMIN — DIGOXIN 250 MCG: 0.25 INJECTION INTRAMUSCULAR; INTRAVENOUS at 10:27

## 2022-12-21 RX ADMIN — ONDANSETRON 4 MG: 2 INJECTION INTRAMUSCULAR; INTRAVENOUS at 00:56

## 2022-12-21 RX ADMIN — SUCRALFATE 1 G: 1 TABLET ORAL at 00:41

## 2022-12-21 RX ADMIN — NITROGLYCERIN 0.4 MG: 0.4 TABLET SUBLINGUAL at 01:03

## 2022-12-21 RX ADMIN — Medication 10 ML: at 20:44

## 2022-12-21 RX ADMIN — FUROSEMIDE 40 MG: 10 INJECTION, SOLUTION INTRAMUSCULAR; INTRAVENOUS at 15:10

## 2022-12-21 RX ADMIN — SUCRALFATE 1 G: 1 TABLET ORAL at 10:27

## 2022-12-21 RX ADMIN — SUCRALFATE 1 G: 1 TABLET ORAL at 15:11

## 2022-12-21 RX ADMIN — CARBIDOPA AND LEVODOPA 2 TABLET: 25; 100 TABLET ORAL at 15:11

## 2022-12-21 RX ADMIN — GABAPENTIN 400 MG: 400 CAPSULE ORAL at 10:27

## 2022-12-21 RX ADMIN — POTASSIUM CHLORIDE 40 MEQ: 750 TABLET, EXTENDED RELEASE ORAL at 01:31

## 2022-12-21 RX ADMIN — BUDESONIDE AND FORMOTEROL FUMARATE DIHYDRATE 2 PUFF: 160; 4.5 AEROSOL RESPIRATORY (INHALATION) at 19:24

## 2022-12-21 RX ADMIN — DIGOXIN 125 MCG: 0.25 INJECTION INTRAMUSCULAR; INTRAVENOUS at 02:11

## 2022-12-21 RX ADMIN — Medication 10 ML: at 10:28

## 2022-12-21 RX ADMIN — SUCRALFATE 1 G: 1 TABLET ORAL at 20:43

## 2022-12-21 RX ADMIN — DIGOXIN 250 MCG: 0.25 INJECTION INTRAMUSCULAR; INTRAVENOUS at 18:27

## 2022-12-21 RX ADMIN — LEVOFLOXACIN 750 MG: 5 INJECTION, SOLUTION INTRAVENOUS at 12:50

## 2022-12-21 RX ADMIN — CARBIDOPA AND LEVODOPA 2 TABLET: 25; 100 TABLET ORAL at 10:27

## 2022-12-21 RX ADMIN — PANTOPRAZOLE SODIUM 40 MG: 40 TABLET, DELAYED RELEASE ORAL at 10:27

## 2022-12-21 RX ADMIN — CARBIDOPA AND LEVODOPA 2 TABLET: 25; 100 TABLET ORAL at 18:27

## 2022-12-21 RX ADMIN — BUDESONIDE AND FORMOTEROL FUMARATE DIHYDRATE 2 PUFF: 160; 4.5 AEROSOL RESPIRATORY (INHALATION) at 08:30

## 2022-12-21 RX ADMIN — CEFTRIAXONE 2 G: 2 INJECTION, POWDER, FOR SOLUTION INTRAMUSCULAR; INTRAVENOUS at 15:11

## 2022-12-21 RX ADMIN — CARBIDOPA AND LEVODOPA 2 TABLET: 25; 100 TABLET ORAL at 00:41

## 2022-12-21 NOTE — CONSULTS
[facility]  Consult Note                                                                              12/21/2022  Josh Petersen MD    Patient Identification:  Trang Liu:   87 y.o.  female  1935     Date of Admission:12/20/2022    CC:  Consult quested by Dr. Barron for atrial fibrillation with rapid rates and congestive heart failure     History of Present Illness:  Patient is a 87-year-old female with history of hypertension, Parkinson's disease, diastolic dysfunction, aortic valve disease previously seen by Dr. Khan in 2019 for atrial fibrillation in the setting of abdominal pain.  Echo showed ejection fraction 63% grade 2 diastolic dysfunction with mild aortic valve stenosis, mild tricuspid regurgitation normal right-sided pressures.  She has not seen cardiology in the interim.  She presents to the hospital and now with progressive weakness, cough for 2 weeks.  She has been diagnosed with COVID-negative pneumonia started on Rocephin.  She was noted to be in atrial fibrillation with rapid rates (duration unknown).  proBNP elevated to 3.121.  Troponin initially normal but slightly increased morning to 0.033, procalcitonin increased yesterday she has been given Lovenox entheses for DVT prophylaxis).  She is received 3 doses of Lopressor IV Lopressor but rate still in the 130s to 140s.  Overnight hemoglobin decreased to 11.3    Overnight she received 3 doses of IV Lopressor, IV dig x 2,, potassium supplements, antibiotics and she remains tachycardic in the 130s with blood pressure 115/95 this morning.  Remains on 2 L O2.  Blood cultures now positive for gram-negative bacteria.    Currently resting quietly heart rate around 110 bpm after second dose of dig..  She had 1 episode of chest pain last night lasting for an uncertain amount of time but she thought it was long.  Was nonradiating.  She has not had this before.  It has resolved has not recurred.  She also note she has had some  dysuria prior to admission and states she has troubles urinating chronically due to urethral dysfunction.    Past Medical History:  Past Medical History:   Diagnosis Date   • Anxiety    • Aortic valve insufficiency    • Ataxia    • Diastolic dysfunction    • GERD (gastroesophageal reflux disease)    • H/O hernia repair     umbilical   • History of hip replacement     left   • Hypertension    • Insomnia    • Mitral regurgitation    • Parkinsons (CMS/HCC)    • Rheumatoid arthritis (CMS/HCC)    • Rotator cuff disorder     left side, also old fracture, doesn';t use this arm due to pain   • Spastic colon    • Tremor    • Tricuspid valve regurgitation    • UTI (urinary tract infection)     frequent   • Wears glasses        Past Surgical History:  Past Surgical History:   Procedure Laterality Date   • APPENDECTOMY     • CATARACT EXTRACTION     • ENDOSCOPY N/A 5/30/2018    LA Grade B reflux esophagitis, HH    • HERNIA REPAIR     • HYSTERECTOMY         Allergies:  Allergies   Allergen Reactions   • Cimetidine Unknown (See Comments)     unknown   • Codeine Itching   • Doxycycline Hives   • Guaifenesin & Derivatives Unknown (See Comments)     Unknown     • Nitrofuran Derivatives Itching   • Oxaprozin Itching   • Penicillins Itching     unknown   • Sulfa Antibiotics Rash     And itching       Home Meds:  Medications Prior to Admission   Medication Sig Dispense Refill Last Dose   • acetaminophen (TYLENOL) 500 MG tablet Take 1,000 mg by mouth Every 6 (Six) Hours As Needed for Mild Pain .   12/19/2022   • amLODIPine (NORVASC) 10 MG tablet Take 10 mg by mouth Every Other Day.   12/19/2022   • Artificial Tear Ointment (DRY EYES OP) Apply  to eye 2 (Two) Times a Day.   12/19/2022   • carbidopa-levodopa (SINEMET)  MG per tablet Take 2 tablets by mouth 4 (Four) Times a Day.   12/19/2022   • chlordiazePOXIDE (LIBRIUM) 5 MG capsule Take 1 capsule by mouth 3 (Three) Times a Day As Needed for Anxiety. 9 capsule 0 12/19/2022   •  fluticasone (FLONASE) 50 MCG/ACT nasal spray 2 sprays into each nostril Daily.   12/19/2022   • fluticasone-salmeterol (ADVAIR) 250-50 MCG/DOSE DISKUS Inhale 1 puff 2 (Two) Times a Day.   12/19/2022   • furosemide (LASIX) 40 MG tablet Take 40 mg by mouth Daily.   12/19/2022   • gabapentin (NEURONTIN) 400 MG capsule Take 400 mg by mouth Daily.   12/19/2022   • HYDROcodone-acetaminophen (NORCO) 5-325 MG per tablet Take 1-2 tablets by mouth Every 6 (Six) Hours As Needed for Moderate Pain  or Severe Pain . 24 tablet 0 12/19/2022   • pantoprazole (PROTONIX) 40 MG EC tablet Take 1 tablet by mouth Daily. 30 tablet 0 12/19/2022   • PARoxetine (PAXIL) 20 MG tablet Take 20 mg by mouth Every Morning.   12/19/2022   • potassium chloride (K-DUR) 10 MEQ CR tablet Take 4 tablets by mouth Daily.   12/20/2022   • sennosides-docusate sodium (SENOKOT-S) 8.6-50 MG tablet Take 2 tablets by mouth 2 (Two) Times a Day As Needed for Constipation.   12/19/2022   • sucralfate (CARAFATE) 1 g tablet Take 1 tablet by mouth 4 (Four) Times a Day. 120 tablet 0 12/19/2022   • vitamin B-12 (CYANOCOBALAMIN) 1000 MCG tablet Take 1 tablet by mouth Daily. 30 tablet 0 12/19/2022   • vitamin D (ERGOCALCIFEROL) 07158 units capsule capsule Take 50,000 Units by mouth Every 7 (Seven) Days.   12/19/2022       Current Meds  Scheduled Meds:amLODIPine, 10 mg, Oral, Every Other Day  azithromycin, 500 mg, Intravenous, Q24H  budesonide-formoterol, 2 puff, Inhalation, BID - RT  carbidopa-levodopa, 2 tablet, Oral, 4x Daily  cefTRIAXone, 1 g, Intravenous, Q24H  enoxaparin, 40 mg, Subcutaneous, Q24H  fluticasone, 2 spray, Nasal, Daily  furosemide, 40 mg, Oral, Daily  gabapentin, 400 mg, Oral, Daily  pantoprazole, 40 mg, Oral, Daily  PARoxetine, 20 mg, Oral, QAM  potassium chloride, 40 mEq, Oral, Daily  sodium chloride, 10 mL, Intravenous, Q12H  sucralfate, 1 g, Oral, 4x Daily      Continuous Infusions:   PRN Meds:.•  acetaminophen **OR** acetaminophen **OR**  acetaminophen  •  chlordiazePOXIDE  •  HYDROcodone-acetaminophen  •  nitroglycerin  •  ondansetron  •  potassium chloride **OR** potassium chloride **OR** potassium chloride  •  sennosides-docusate  •  [COMPLETED] Insert Peripheral IV **AND** sodium chloride  •  sodium chloride  •  sodium chloride    Social History:   Social History     Socioeconomic History   • Marital status:    Tobacco Use   • Smoking status: Never   • Smokeless tobacco: Never   Substance and Sexual Activity   • Alcohol use: No   • Drug use: No   • Sexual activity: Defer       Family History:  Family History   Problem Relation Age of Onset   • Diabetes Mother    • Arthritis Mother    • Heart failure Mother    • Diabetes Father    • Heart disease Father    • Arthritis Father        REVIEW OF SYSTEMS:   CONSTITUTIONAL: Per HPI  HEENT: Eyes: No visual loss, blurred vision, double vision or yellow sclerae. Ears, Nose, Throat: No hearing loss, sneezing, congestion, runny nose or sore throat.   SKIN: No rash or itching.     RESPIRATORY: No  hemoptysis, cough or sputum.   GASTROINTESTINAL: No anorexia, nausea, vomiting or diarrhea. No abdominal pain, bright red blood per rectum or melena.  GENITOURINARY: She has had dysuria and increased frequency of urination  NEUROLOGICAL: Has had generalized weakness but no syncope  MUSCULOSKELETAL: No muscle, back pain, joint pain or stiffness.   HEMATOLOGIC: No anemia, bleeding or bruising.   PSYCHIATRIC: No history of depression, anxiety, hallucinations.   ENDOCRINOLOGIC: No reports of sweating, cold or heat intolerance. No polyuria or polydipsia.     Physical Exam    /95   Pulse (!) 130   Temp 98.1 °F (36.7 °C) (Oral)   Resp 18   Ht 167.6 cm (66\")   Wt 66.3 kg (146 lb 1.6 oz)   SpO2 94%   BMI 23.58 kg/m²     General Appearance  elderly frail-appearing female in no acute distress   Head Normocephalic, without abnormality, atraumatic   Ears Ears appear intact with no abnormalities noted   Throat  No oral lesions, no thrush, oral mucosa moist   Neck No adenopathy, supple, trachea midline, no thyromegaly, no carotid bruit, elevated JVD   Back No skin lesions, erythema or scars, no tenderness to percussion or palpation,range of motion is normal   Lungs Clear to auscultation,respirations regular, even and unlabored   Heart Regular rhythm and normal rate, normal S1 and S2, 2/6 systolic ejection murmur, no gallop, no rub, no click   Chest wall No abnormalities observed   Abdomen Normal bowel sounds, no masses, no hepatomegaly,    Extremities Moves all extremities well, no edema, no cyanosis, no redness   Pulses Pulses palpable and equal bilaterally. Normal radial, carotid, femoral, dorsalis pedis and posterior tibial pulses bilaterally.   Skin No bleeding, bruising or rash   Psychiatric Alert and oriented x 3, normal mood and affect     Results from last 7 days   Lab Units 12/21/22  0506   SODIUM mmol/L 137   POTASSIUM mmol/L 4.4   CHLORIDE mmol/L 105   CO2 mmol/L 23.0   BUN mg/dL 15   CREATININE mg/dL 0.79   CALCIUM mg/dL 8.9   BILIRUBIN mg/dL 0.6   ALK PHOS U/L 51   ALT (SGPT) U/L 6   AST (SGOT) U/L 22   GLUCOSE mg/dL 116*     Results from last 7 days   Lab Units 12/21/22  0126 12/20/22  1820 12/20/22  1610   TROPONIN T ng/mL 0.033* 0.018 0.028     Results from last 7 days   Lab Units 12/21/22  0506 12/20/22  1610   WBC 10*3/mm3 13.62* 13.40*   HEMOGLOBIN g/dL 11.3* 13.1   HEMATOCRIT % 33.8* 37.6   PLATELETS 10*3/mm3 143 138*         Results from last 7 days   Lab Units 12/20/22  1610   MAGNESIUM mg/dL 1.6     I personally viewed and interpreted the patient's EKG/Telemetry data  I have reviewed HPI and ROS above.    Assessment and Plan  1.  Atrial fibrillation, unknown duration history of atrial fibrillation 2019.  Rates remain elevated.  Add digoxin as blood pressure low.  Already being treated for pneumonia with antibiotic.  Increase Lovenox to treat atrial fibrillation  2.  Pneumonia with bacteremia.  Given  dysuria will order UA.  3.  Chest with elevated troponin consistent with demand related non-STEMI.  Suspect this is more due to tachycardia versus true acute coronary syndrome.  We will treat infectious etiology, tachycardia as below, gently diurese.  Await echo results depending on these results will need further coronary evaluation either as inpatient or outpatient.  We will discontinue amlodipine and as blood pressure improves we will add Toprol and low-dose ACE inhibitor therapy.  Add enteric-coated aspirin now.  3.  Congestive heart failure likely due to tachycardia.  Treat tachycardia, diurese gently and await echo.  Likely has significant aortic valve stenosis  4.  Hypomagnesemia with hypokalemia, repleted  5.  Mild aortic valve stenosis.  By exam this sounds at least moderate.  Await echocardiogram results though will need to keep in mind gradients will be elevated due to hyperdynamic state  6.  Hypertension with diastolic dysfunction and no heart failure  7.  GERD  8.  Parkinson's disease  9.  Rheumatoid arthritis      Mini Marcus  12/21/2022  09:36 EST    60min spent in reviewing records, discussion and examination of the patient and discussion with other members of the patient's medical team.     Dictated utilizing Dragon dictation

## 2022-12-21 NOTE — CASE MANAGEMENT/SOCIAL WORK
Continued Stay Note  [facility]     Patient Name: Trang Liu  MRN: [id number]  Today's Date: 12/21/2022    Admit Date: 12/20/2022    Plan: Home with daughter. Follow for possible home O2 need at D/C.  PT eval pending. Follow up for possible HH or SNF need. Family to transport   Discharge Plan     Row Name 12/21/22 1746       Plan    Plan Home with daughter. Follow for possible home O2 need at D/C.  PT eval pending. Follow up for possible HH or SNF need. Family to transport    Patient/Family in Agreement with Plan yes    Plan Comments Met with pt. at bedside. Explained roll of . Face sheet and pharmacy verified. Pt states she lives in her home and her daughter, Jasmyn, lives with her. States she is currently renting a room to her daughter, Jasmyn, sig other. Pt states Jasmyn and her sig other assist her at home. There is one-step to enter home. Home DME equipment includes a walker.  Pt states she is mostly independent with ADLs but has help if needed. Pt has been to [facility] and has used HH but unable to recall name. Pt’s PCP is Nayla VERA. Uses [facility] in [facility]. Pt no longer drives. States her daughter or sig other drives her to appointments.  At discharge, family will transport. Currently on O2@2LNC. Will need to follow for possible home O2 need. PT eval remains pending.  Explained that CCP would follow to assess for discharge needs.  Saroj Jones RN-BC               Discharge Codes    No documentation.               Expected Discharge Date and Time     Expected Discharge Date Expected Discharge Time    Dec 24, 2022             Saroj Jones RN

## 2022-12-21 NOTE — PLAN OF CARE
Goal Outcome Evaluation:              Outcome Evaluation: Pt HR elevated at beginning of shift. HR in the 140s-150s sustaining. Cardio notified. IV digoxin given. Blood cultures positive. MD aware. Azithromycin d/c. Pt started on levaquin x1 and rocephin q8. Repeat cultures to be drawn tomorrow. urine sample needed still. Will conintue to monitor.

## 2022-12-21 NOTE — NURSING NOTE
Pt brought home pain med with her a total of 6 pills of hydrocodone, this RN stored in a security bag with pt's name and labels, will send it to pharmacy when pharmacy tech comes here for meds running.    Cardiologist consult: This RN called and left a voice message for answering service regarding cardiologist routine consult.

## 2022-12-21 NOTE — PROGRESS NOTES
[facility] Clinical Pharmacy Services: Enoxaparin Consult    Trang Liu has a pharmacy consult to dose prophylactic enoxaparin per Vianey VERA's request.     Indication: VTE Prophylaxis  Home Anticoagulation: none     Relevant clinical data and objective history reviewed:  87 y.o. female 167.6 cm (66\") 66.3 kg (146 lb 1.6 oz)   Body mass index is 23.58 kg/m².   Results from last 7 days   Lab Units 12/20/22  1610   PLATELETS 10*3/mm3 138*     Estimated Creatinine Clearance: 57.1 mL/min (by C-G formula based on SCr of 0.74 mg/dL).    Assessment/Plan    Will start patient on 40mg subcutaneous every 24 hours, adjusted for renal function. Consult order will be discontinued but pharmacy will continue to follow.     Daina Tamayo, [facility]  Clinical Pharmacist

## 2022-12-21 NOTE — PROGRESS NOTES
Notified that blood cultures are preliminary positive for gram-negative bacilli and patient being treated for pneumonia    I will adjust Rocephin to 2 g and I will also empirically dose her with 1 dose of IV Levaquin at 750 mg to provide dual gram-negative julio c coverage for the next 24 hours until cultures reveal bacterial etiology    DC azithromycin    NP to formally see patient today -will need repeat blood cultures in a.m.

## 2022-12-21 NOTE — PROGRESS NOTES
Name: Trang Liu ADMIT: 2022   : 1935  PCP: Nayla Higginbotham APRN    MRN: [id number] LOS: 1 days   AGE/SEX: 87 y.o. female  ROOM: [facility]     Subjective   Subjective   Patient appears lethargic, elderly, frail, generally weak, relatively comfortable, no apparent distress.  Answering questions appropriately.  Voices no new concerns--states \"I am tired\" and had a hard time sleeping last night due to abrasive sounds from outside room during night.    Review of Systems   Constitutional: Positive for activity change, appetite change and fatigue. Negative for chills and fever.   Respiratory: Negative for cough and shortness of breath.    Cardiovascular: Negative for chest pain and leg swelling.   Gastrointestinal: Negative for abdominal pain, constipation, diarrhea and nausea.   Genitourinary: Negative for difficulty urinating and dysuria.   Musculoskeletal: Positive for gait problem (due to generalized weakness). Negative for myalgias.        Objective   Objective   Vital Signs  Temp:  [97.6 °F (36.4 °C)-99.9 °F (37.7 °C)] 97.6 °F (36.4 °C)  Heart Rate:  [] 87  Resp:  [18-20] 18  BP: ()/(64-98) 106/69  SpO2:  [88 %-98 %] 97 %  on  Flow (L/min):  [2] 2;   Device (Oxygen Therapy): nasal cannula  Body mass index is 23.57 kg/m².     Physical Exam  Constitutional:       General: She is not in acute distress.     Appearance: She is ill-appearing. She is not toxic-appearing.      Comments: Generally weak and lethargic   Cardiovascular:      Rate and Rhythm: Normal rate.      Heart sounds: Murmur heard.   Pulmonary:      Effort: Pulmonary effort is normal.      Comments: Diminished on expiration throughout lung fields  Abdominal:      General: Bowel sounds are normal.      Palpations: Abdomen is soft.   Musculoskeletal:      Right lower leg: No edema.      Left lower leg: No edema.   Skin:     General: Skin is warm and dry.       Results Review     I reviewed the patient's new clinical  results.  Results from last 7 days   Lab Units 12/21/22  0506 12/20/22  1610   WBC 10*3/mm3 13.62* 13.40*   HEMOGLOBIN g/dL 11.3* 13.1   PLATELETS 10*3/mm3 143 138*     Results from last 7 days   Lab Units 12/21/22  0506 12/20/22  1610   SODIUM mmol/L 137 138   POTASSIUM mmol/L 4.4 2.9*   CHLORIDE mmol/L 105 100   CO2 mmol/L 23.0 24.3   BUN mg/dL 15 14   CREATININE mg/dL 0.79 0.74   GLUCOSE mg/dL 116* 109*   EGFR mL/min/1.73 73.0 78.9     Results from last 7 days   Lab Units 12/21/22  0506 12/20/22  1610   ALBUMIN g/dL 3.10* 3.30*   BILIRUBIN mg/dL 0.6 0.9   ALK PHOS U/L 51 52   AST (SGOT) U/L 22 16   ALT (SGPT) U/L 6 17     Results from last 7 days   Lab Units 12/21/22  0506 12/20/22  1610   CALCIUM mg/dL 8.9 9.5   ALBUMIN g/dL 3.10* 3.30*   MAGNESIUM mg/dL  --  1.6     Results from last 7 days   Lab Units 12/20/22  1820 12/20/22  1610   PROCALCITONIN ng/mL  --  0.67*   LACTATE mmol/L 2.0  --      No results found for: HGBA1C, POCGLU    No radiology results for the last day  Scheduled Medications  aspirin, 81 mg, Oral, Daily  budesonide-formoterol, 2 puff, Inhalation, BID - RT  carbidopa-levodopa, 2 tablet, Oral, 4x Daily  cefTRIAXone, 2 g, Intravenous, Q24H  digoxin, 250 mcg, Intravenous, Once  enoxaparin, 1 mg/kg, Subcutaneous, Q12H  fluticasone, 2 spray, Nasal, Daily  furosemide, 40 mg, Intravenous, Q12H  gabapentin, 400 mg, Oral, Daily  pantoprazole, 40 mg, Oral, Daily  PARoxetine, 20 mg, Oral, QAM  potassium chloride, 40 mEq, Oral, Daily  potassium chloride, 20 mEq, Oral, Daily  sodium chloride, 10 mL, Intravenous, Q12H  sucralfate, 1 g, Oral, 4x Daily    Infusions   Diet  Diet: Cardiac Diets; Healthy Heart (2-3 Na+); Texture: Regular Texture (IDDSI 7); Fluid Consistency: Thin (IDDSI 0)       Assessment/Plan     Active Hospital Problems    Diagnosis  POA   • **Pneumonia due to infectious organism, unspecified laterality, unspecified part of lung [J18.9]  Yes   • Bacteremia [R78.81]  Yes   • Acute respiratory  failure with hypoxia ([facility]) [J96.01]  Yes   • Sepsis ([facility]) [A41.9]  Yes   • Atrial fibrillation ([facility]) [I48.91]  Yes   • Hypokalemia [E87.6]  Yes   • Gastroesophageal reflux disease [K21.9]  Yes   • GERD (gastroesophageal reflux disease) [K21.9]  Yes   • Hypertension [I10]  Yes   • Parkinsons ([facility]) [G20]  Yes   • Rheumatoid arthritis ([facility]) [M06.9]  Yes      Resolved Hospital Problems   No resolved problems to display.       87 y.o. female admitted with Pneumonia due to infectious organism, unspecified laterality, unspecified part of lung.      Acute respiratory failure with hypoxia / pneumonia due to infectious organism, unspecified laterality, unspecified part of lung: Oxygen saturation 97% on 2 L nasal cannula (baseline room air) with no overt signs of increased respiratory work effort or distress.  Procalcitonin 0.6.  See plan below.  Inhaled corticosteroid/LAMA.      Bacteremia: Status post IV azithromycin.  IV ceftriaxone 2 g continued and one-time dose of IV Levaquin 750 mg provided for dual gram-negative julio c coverage for the next 24 hours until cultures reveal bacterial etiology.  Plan repeat blood cultures in AM.      GERD (gastroesophageal reflux disease): Stable appearance.  PPI, Carafate.      Hypertension: BP soft.  Continue monitor blood pressure in the absence of antihypertensive agents.      Parkinsons ([facility]):  Complicating all problems.  Falls precautions.  Sinemet 4 times daily continued.      Rheumatoid arthritis ([facility]):  Complicating all problems.  No signs of flare.        Macrocytosis: Ordering serum vitamin B12.      Hypokalemia: Normalized following KCl 40 mEq p.o. once.  Repeat labs in AM.      Atrial fibrillation ([facility]): Cardiology following and managing rate control with digoxin given low blood pressures.  Therapeutic Lovenox provided.      Sepsis ([facility]): Possible multifactorial given complaints of dysuria vs pneumonia vs both.  Hemodynamically stable.  Urinalysis ordered by  cardiology.    · enoxaparin for DVT prophylaxis.  · Full code.  · Discussed with patient, RN, & Dr. Barron.   · Anticipate discharge pending clinical course      JODY Gooden  [facility]  12/21/22  14:25 EST

## 2022-12-21 NOTE — H&P
Patient Name:  Trang Liu  YOB: 1935  MRN:  [id number]  Admit Date:  12/20/2022  Patient Care Team:  Nayla Higginbotham APRN as PCP - General (Family Medicine)      Subjective   History Present Illness     Chief Complaint   Patient presents with   • Palpitations     History of Present Illness   Mrs. Liu is a 87-year-old female with history of GERD, hypertension, Parkinson's, rheumatoid arthritis, GERD, A. fib who presents to the emergency room with productive cough and weakness for about 2 weeks.  Patient states has been feeling bad for about 2 weeks and is gradually gotten weaker and weaker, she did go to her primary care doctor today and was found to be in A. fib with RVR and sent to the emergency room for further evaluation.  Patient states she did start having palpitations today.  She denies any swelling in her feet.  She states she does \"tend to get pneumonia easily\" but is normally able to manage it as outpatient.  She states she normally uses a walker at home due to her Parkinson's disease but for the past week even walking to the bathroom she has to sit down and rest.  She is vaccinated against COVID-19 and flu.  Patient denies having any fever at home although she has had chills.  She denies any abdominal pain, no nausea or vomiting.  She has had a poor appetite but has been able to tolerate fluids.    In the emergency room patient was found to be in A. fib with RVR with a heart rate in the 140s initially, she was given a dose of metoprolol and IV fluids and heart rate has come down into the high 90s.  Patient's BNP elevated 3121, glucose 109, potassium 2.9, this was started to be replaced in the emergency room, lactate 2.0, magnesium 1.6, procalcitonin elevated 0.67, white blood cell count 13.4, hemoglobin 13.1, platelets 138, blood cultures are pending.  Patient was started on Rocephin in the emergency room, respiratory viral panel was negative.  Chest x-ray shows no  definite new focal consolidation.    Review of Systems   Constitutional: Positive for appetite change and fatigue. Negative for fever.   HENT: Negative for nosebleeds and trouble swallowing.    Eyes: Negative for photophobia, redness and visual disturbance.   Respiratory: Positive for cough and shortness of breath. Negative for chest tightness and wheezing.    Cardiovascular: Negative for chest pain, palpitations and leg swelling.   Gastrointestinal: Negative for abdominal distention, abdominal pain, nausea and vomiting.   Endocrine: Negative.    Genitourinary: Negative.    Musculoskeletal: Negative for gait problem and joint swelling.   Skin: Negative.    Neurological: Positive for weakness. Negative for dizziness, seizures, speech difficulty, light-headedness and headaches.   Hematological: Negative.    Psychiatric/Behavioral: Negative for behavioral problems and confusion.        Personal History     Past Medical History:   Diagnosis Date   • Anxiety    • Aortic valve insufficiency    • Ataxia    • Diastolic dysfunction    • GERD (gastroesophageal reflux disease)    • H/O hernia repair     umbilical   • History of hip replacement     left   • Hypertension    • Insomnia    • Mitral regurgitation    • Parkinsons (CMS/HCC)    • Rheumatoid arthritis (CMS/HCC)    • Rotator cuff disorder     left side, also old fracture, doesn';t use this arm due to pain   • Spastic colon    • Tremor    • Tricuspid valve regurgitation    • UTI (urinary tract infection)     frequent   • Wears glasses      Past Surgical History:   Procedure Laterality Date   • APPENDECTOMY     • CATARACT EXTRACTION     • ENDOSCOPY N/A 5/30/2018    LA Grade B reflux esophagitis, HH    • HERNIA REPAIR     • HYSTERECTOMY       Family History   Problem Relation Age of Onset   • Diabetes Mother    • Arthritis Mother    • Heart failure Mother    • Diabetes Father    • Heart disease Father    • Arthritis Father      Social History     Tobacco Use   • Smoking  status: Never   • Smokeless tobacco: Never   Substance Use Topics   • Alcohol use: No   • Drug use: No     No current facility-administered medications on file prior to encounter.     Current Outpatient Medications on File Prior to Encounter   Medication Sig Dispense Refill   • acetaminophen (TYLENOL) 500 MG tablet Take 1,000 mg by mouth Every 6 (Six) Hours As Needed for Mild Pain .     • amLODIPine (NORVASC) 10 MG tablet Take 10 mg by mouth Every Other Day.     • Artificial Tear Ointment (DRY EYES OP) Apply  to eye 2 (Two) Times a Day.     • carbidopa-levodopa (SINEMET)  MG per tablet Take 2 tablets by mouth 4 (Four) Times a Day.     • chlordiazePOXIDE (LIBRIUM) 5 MG capsule Take 1 capsule by mouth 3 (Three) Times a Day As Needed for Anxiety. 9 capsule 0   • fluticasone (FLONASE) 50 MCG/ACT nasal spray 2 sprays into each nostril Daily.     • fluticasone-salmeterol (ADVAIR DISKUS) 250-50 MCG/DOSE DISKUS Inhale 1 puff 2 (Two) Times a Day.     • furosemide (LASIX) 40 MG tablet Take 40 mg by mouth Daily.     • gabapentin (NEURONTIN) 400 MG capsule Take 400 mg by mouth Daily.     • HYDROcodone-acetaminophen (NORCO) 5-325 MG per tablet Take 1-2 tablets by mouth Every 6 (Six) Hours As Needed for Moderate Pain  or Severe Pain . 24 tablet 0   • pantoprazole (PROTONIX) 40 MG EC tablet Take 1 tablet by mouth Daily. 30 tablet 0   • PARoxetine (PAXIL) 20 MG tablet Take 20 mg by mouth Every Morning.     • potassium chloride (K-DUR) 10 MEQ CR tablet Take 4 tablets by mouth Daily.     • sennosides-docusate sodium (SENOKOT-S) 8.6-50 MG tablet Take 2 tablets by mouth 2 (Two) Times a Day As Needed for Constipation.     • sucralfate (CARAFATE) 1 g tablet Take 1 tablet by mouth 4 (Four) Times a Day. 120 tablet 0   • vitamin B-12 (CYANOCOBALAMIN) 1000 MCG tablet Take 1 tablet by mouth Daily. 30 tablet 0   • vitamin D (ERGOCALCIFEROL) 13474 units capsule capsule Take 50,000 Units by mouth Every 7 (Seven) Days.       Allergies    Allergen Reactions   • Cimetidine Unknown (See Comments)     unknown   • Codeine Itching   • Doxycycline Hives   • Guaifenesin & Derivatives Unknown (See Comments)     Unknown     • Nitrofuran Derivatives Itching   • Oxaprozin Itching   • Penicillins Itching     unknown   • Sulfa Antibiotics Rash     And itching       Objective    Objective     Vital Signs  Temp:  [98.2 °F (36.8 °C)] 98.2 °F (36.8 °C)  Heart Rate:  [] 95  Resp:  [20] 20  BP: (143)/(86) 143/86  SpO2:  [96 %-98 %] 96 %  on   ;   Device (Oxygen Therapy): room air  Body mass index is 23.94 kg/m².    Physical Exam  Vitals and nursing note reviewed.   Constitutional:       General: She is not in acute distress.     Appearance: She is well-developed.   HENT:      Head: Normocephalic.   Neck:      Vascular: No JVD.   Cardiovascular:      Rate and Rhythm: Normal rate and regular rhythm.      Comments: A. fib on the monitor with heart rate 94 during my exam, she denies any chest pain or shortness of breath, no peripheral edema  Pulmonary:      Effort: Pulmonary effort is normal.      Breath sounds: Normal breath sounds.      Comments: Lung sounds diminished but clear, sats 95% on 2 L of oxygen  Abdominal:      General: There is no distension.      Palpations: Abdomen is soft.      Tenderness: There is no abdominal tenderness.   Musculoskeletal:         General: Normal range of motion.      Cervical back: Normal range of motion.   Skin:     General: Skin is warm and dry.      Capillary Refill: Capillary refill takes less than 2 seconds.   Neurological:      General: No focal deficit present.      Mental Status: She is alert and oriented to person, place, and time.   Psychiatric:         Attention and Perception: Attention normal.         Mood and Affect: Mood normal.         Speech: Speech normal.         Behavior: Behavior normal.         Cognition and Memory: Cognition normal.         Judgment: Judgment normal.         Results Review:  I reviewed the  patient's new clinical results.  I reviewed the patient's new imaging results and agree with the interpretation.  I reviewed the patient's other test results and agree with the interpretation  I personally viewed and interpreted the patient's EKG/Telemetry data  Discussed with ED provider.    Lab Results (last 24 hours)     Procedure Component Value Units Date/Time    CBC & Differential [[id number]]  (Abnormal) Collected: 12/20/22 1610    Specimen: Blood Updated: 12/20/22 1623    Narrative:      The following orders were created for panel order CBC & Differential.  Procedure                               Abnormality         Status                     ---------                               -----------         ------                     CBC Auto Differential[[id number]]        Abnormal            Final result                 Please view results for these tests on the individual orders.    Comprehensive Metabolic Panel [[id number]]  (Abnormal) Collected: 12/20/22 1610    Specimen: Blood Updated: 12/20/22 1700     Glucose 109 mg/dL      BUN 14 mg/dL      Creatinine 0.74 mg/dL      Sodium 138 mmol/L      Potassium 2.9 mmol/L      Comment: Slight hemolysis detected by analyzer. Results may be affected.        Chloride 100 mmol/L      CO2 24.3 mmol/L      Calcium 9.5 mg/dL      Total Protein 6.0 g/dL      Albumin 3.30 g/dL      ALT (SGPT) 17 U/L      AST (SGOT) 16 U/L      Alkaline Phosphatase 52 U/L      Total Bilirubin 0.9 mg/dL      Globulin 2.7 gm/dL      A/G Ratio 1.2 g/dL      BUN/Creatinine Ratio 18.9     Anion Gap 13.7 mmol/L      eGFR 78.9 mL/min/1.73      Comment: National Kidney Foundation and American Society of Nephrology (ASN) Task Force recommended calculation based on the Chronic Kidney Disease Epidemiology Collaboration (CKD-EPI) equation refit without adjustment for race.       Narrative:      GFR Normal >60  Chronic Kidney Disease <60  Kidney Failure <15    The GFR formula is only valid for adults with  stable renal function between ages 18 and 70.    BNP [[id number]]  (Abnormal) Collected: 12/20/22 1610    Specimen: Blood Updated: 12/20/22 1649     proBNP 3,121.0 pg/mL     Narrative:      Among patients with dyspnea, NT-proBNP is highly sensitive for the detection of acute congestive heart failure. In addition NT-proBNP of <300 pg/ml effectively rules out acute congestive heart failure with 99% negative predictive value.    Results may be falsely decreased if patient taking Biotin.      Troponin [[id number]]  (Normal) Collected: 12/20/22 1610    Specimen: Blood Updated: 12/20/22 1700     Troponin T 0.028 ng/mL     Narrative:      Troponin T Reference Range:  <= 0.03 ng/mL-   Negative for AMI  >0.03 ng/mL-     Abnormal for myocardial necrosis.  Clinicians would have to utilize clinical acumen, EKG, Troponin and serial changes to determine if it is an Acute Myocardial Infarction or myocardial injury due to an underlying chronic condition.       Results may be falsely decreased if patient taking Biotin.      Magnesium [[id number]]  (Normal) Collected: 12/20/22 1610    Specimen: Blood Updated: 12/20/22 1700     Magnesium 1.6 mg/dL     CBC Auto Differential [[id number]]  (Abnormal) Collected: 12/20/22 1610    Specimen: Blood Updated: 12/20/22 1623     WBC 13.40 10*3/mm3      RBC 3.81 10*6/mm3      Hemoglobin 13.1 g/dL      Hematocrit 37.6 %      MCV 98.7 fL      MCH 34.4 pg      MCHC 34.8 g/dL      RDW 13.4 %      RDW-SD 49.8 fl      MPV 12.4 fL      Platelets 138 10*3/mm3      Neutrophil % 78.4 %      Lymphocyte % 9.3 %      Monocyte % 10.7 %      Eosinophil % 0.1 %      Basophil % 0.2 %      Immature Grans % 1.3 %      Neutrophils, Absolute 10.51 10*3/mm3      Lymphocytes, Absolute 1.24 10*3/mm3      Monocytes, Absolute 1.43 10*3/mm3      Eosinophils, Absolute 0.01 10*3/mm3      Basophils, Absolute 0.03 10*3/mm3      Immature Grans, Absolute 0.18 10*3/mm3      nRBC 0.0 /100 WBC     Respiratory Panel PCR  w/COVID-19(SARS-CoV-2) [facility] In-House, NP Swab in UTM/VTM, 3-4 HR TAT - Swab, Nasopharynx [[id number]]  (Normal) Collected: 12/20/22 1610    Specimen: Swab from Nasopharynx Updated: 12/20/22 1710     ADENOVIRUS, PCR Not Detected     Coronavirus 229E Not Detected     Coronavirus HKU1 Not Detected     Coronavirus NL63 Not Detected     Coronavirus OC43 Not Detected     COVID19 Not Detected     Human Metapneumovirus Not Detected     Human Rhinovirus/Enterovirus Not Detected     Influenza A PCR Not Detected     Influenza B PCR Not Detected     Parainfluenza Virus 1 Not Detected     Parainfluenza Virus 2 Not Detected     Parainfluenza Virus 3 Not Detected     Parainfluenza Virus 4 Not Detected     RSV, PCR Not Detected     Bordetella pertussis pcr Not Detected     Bordetella parapertussis PCR Not Detected     Chlamydophila pneumoniae PCR Not Detected     Mycoplasma pneumo by PCR Not Detected    Narrative:      In the setting of a positive respiratory panel with a viral infection PLUS a negative procalcitonin without other underlying concern for bacterial infection, consider observing off antibiotics or discontinuation of antibiotics and continue supportive care. If the respiratory panel is positive for atypical bacterial infection (Bordetella pertussis, Chlamydophila pneumoniae, or Mycoplasma pneumoniae), consider antibiotic de-escalation to target atypical bacterial infection.    Procalcitonin [[id number]]  (Abnormal) Collected: 12/20/22 1610    Specimen: Blood Updated: 12/20/22 1819     Procalcitonin 0.67 ng/mL     Narrative:      As a Marker for Sepsis (Non-Neonates):    1. <0.5 ng/mL represents a low risk of severe sepsis and/or septic shock.  2. >2 ng/mL represents a high risk of severe sepsis and/or septic shock.    As a Marker for Lower Respiratory Tract Infections that require antibiotic therapy:    PCT on Admission    Antibiotic Therapy       6-12 Hrs later    >0.5                Strongly  Recommended  >0.25 - <0.5        Recommended   0.1 - 0.25          Discouraged              Remeasure/reassess PCT  <0.1                Strongly Discouraged     Remeasure/reassess PCT    As 28 day mortality risk marker: \"Change in Procalcitonin Result\" (>80% or <=80%) if Day 0 (or Day 1) and Day 4 values are available. Refer to http://www.[facility]-pct-calculator.com    Change in PCT <=80%  A decrease of PCT levels below or equal to 80% defines a positive change in PCT test result representing a higher risk for 28-day all-cause mortality of patients diagnosed with severe sepsis for septic shock.    Change in PCT >80%  A decrease of PCT levels of more than 80% defines a negative change in PCT result representing a lower risk for 28-day all-cause mortality of patients diagnosed with severe sepsis or septic shock.       Blood Culture - Blood, Arm, Right [[id number]] Collected: 12/20/22 1758    Specimen: Blood from Arm, Right Updated: 12/20/22 1805    Troponin [[id number]]  (Normal) Collected: 12/20/22 1820    Specimen: Blood Updated: 12/20/22 1852     Troponin T 0.018 ng/mL     Narrative:      Troponin T Reference Range:  <= 0.03 ng/mL-   Negative for AMI  >0.03 ng/mL-     Abnormal for myocardial necrosis.  Clinicians would have to utilize clinical acumen, EKG, Troponin and serial changes to determine if it is an Acute Myocardial Infarction or myocardial injury due to an underlying chronic condition.       Results may be falsely decreased if patient taking Biotin.      Lactic Acid, Plasma [[id number]]  (Normal) Collected: 12/20/22 1820    Specimen: Blood, Central Line Updated: 12/20/22 1853     Lactate 2.0 mmol/L     Blood Culture - Blood, Arm, Left [[id number]] Collected: 12/20/22 1820    Specimen: Blood from Arm, Left Updated: 12/20/22 1833          Imaging Results (Last 24 Hours)     Procedure Component Value Units Date/Time    XR Chest 1 View [[id number]] Collected: 12/20/22 1630     Updated: 12/20/22 1637    Narrative:       XR CHEST 1 VW-     HISTORY:  Palpitations, atrial fibrillation.     COMPARISON:  Chest and left rib radiographs 3/11/2020. CT abdomen and  pelvis 3/11/2020     FINDINGS:    A single view of the chest was obtained. The cardiac silhouette and  mediastinal and hilar contours are not significantly changed. There is  calcific aortic atherosclerosis. Masslike density with corresponding  areas of lucency projecting over the right lung base correspond to a  large fat and colon containing hernia on prior CT from 2020,  incompletely assessed on the current examination but similar to slightly  larger compared to prior radiographs from 2020. This limits evaluation  of the right lung base. No definite new focal consolidation is  identified. Pleural spaces are clear. There is degenerative disc  disease.     This report was finalized on 12/20/2022 4:33 PM by Dr. Indigo Heart M.D.             Results for orders placed during the hospital encounter of 02/12/19    Transthoracic Echo Complete With Contrast if Necessary Per Protocol    Interpretation Summary  · Left ventricular systolic function is normal. Calculated EF = 63.0%. Estimated EF was in agreement with the calculated EF. Normal left ventricular cavity size and wall thickness noted. All left ventricular wall segments contract normally.  · Left ventricular diastolic dysfunction is noted (grade II w/high LAP) consistent with pseudonormalization.  · There is moderate calcification of the aortic valve  · Mild aortic valve stenosis is present.  · Mild tricuspid valve regurgitation is present. Estimated right ventricular systolic pressure from tricuspid regurgitation is normal (<35 mmHg).      ECG 12 Lead Tachycardia   Final Result   HEART RATE= 131  bpm   RR Interval= 458  ms   MD Interval=   ms   P Horizontal Axis=   deg   P Front Axis=   deg   QRSD Interval= 73  ms   QT Interval= 326  ms   QRS Axis= 83  deg   T Wave Axis= 81  deg   - ABNORMAL ECG -   Atrial fibrillation -  new   Borderline right axis deviation   Anteroseptal infarct, old   ST depression, probably rate related   Ventricular premature complex   Electronically Signed By: Aylin Coffey ([facility]) 20-Dec-2022 18:52:22   Date and Time of Study: 2022-12-20 15:44:36           Assessment/Plan     Active Hospital Problems    Diagnosis  POA   • **Pneumonia due to infectious organism, unspecified laterality, unspecified part of lung [J18.9]  Yes   • Atrial fibrillation (HCC) [I48.91]  Yes   • Hypokalemia [E87.6]  Yes   • Gastroesophageal reflux disease [K21.9]  Yes     Added automatically from request for surgery [id number]     • GERD (gastroesophageal reflux disease) [K21.9]  Yes   • Hypertension [I10]  Yes   • Parkinsons (HCC) [G20]  Yes   • Rheumatoid arthritis (HCC) [M06.9]  Yes     Mrs. Liu is a 87-year-old female with history of GERD, hypertension, Parkinson's, rheumatoid arthritis, GERD, A. fib who presents to the emergency room with productive cough and weakness for about 2 weeks.     Pneumonia due to infectious organism  -Patient started on Rocephin in the emergency room, will continue that for now awaiting cultures  -Blood culture pending  -will order sputum culture, urine for Legionella, S. Pneumo Ag urine  -Telemetry unit for monitoring  -Incentive spirometer while awake  -O2 to keep sats above 90%    A. fib/A. fib with RVR/elevated BNP  -Telemetry unit for monitoring  -2D echo in a.m.  -Consult cardiology    Hypertension/GERD  -Chronic conditions stable at this time, continue to monitor  -Continue home medications when med rec is complete    Parkinson's/rheumatoid arthritis  -Up with assist  -Safety precautions continue home medications  -      · I discussed the patient's findings and my recommendations with patient.    VTE Prophylaxis - SCDs.  Code Status - Full code.       JODY Velasquez  [facility]  12/20/22  19:13 EST

## 2022-12-21 NOTE — PLAN OF CARE
Goal Outcome Evaluation:  Plan of Care Reviewed With: patient        Progress: improving  Outcome Evaluation: Pt was admitted to  last night right after shift exchange. HR was between 110s to up 160s. PRN Lopressor given a total of 3 doses over night, plus one dose of Digoxin IV. K was 2.9. Replaced 3 times per order. C/O arthritis last night, Norco given per request. Abx given for possible pneumonia, XR chest shows no pneumonia. Pt complained of chest pain at around 1 AM, SL Nitro given 1 time, pt stated didn't help. 5 mins later rechecked BP, SBP 94, not qualified to give a 2nd dose of Nitro per order. Oncall provider paged and updated. One time dose of Morphine ordered for chest pain. STAT EKG and TROPONIN ordered and done. EKG similar to previous one, still Afib with RVR. Troponin came back elevated. Oncall provider notified, no new orders received. At around 3 Jeb, pt HR was better, from 80 to 130is, but not sustained on high, most of the time was 90 to 100ish after the digoxin.  Routine cardiologist consult call made last night, voice message left. Pt had a BM, missed the urine sample last night. Sputum and urine sample to be done. Pt denied any chest pain this morning around 6:30 AM. Pt had some snacks last night. Pt's needs met.

## 2022-12-22 PROBLEM — B96.20 E COLI BACTEREMIA: Status: ACTIVE | Noted: 2022-12-21

## 2022-12-22 PROBLEM — E87.6 HYPOKALEMIA: Status: RESOLVED | Noted: 2019-02-12 | Resolved: 2022-12-22

## 2022-12-22 PROBLEM — I35.0 AORTIC VALVE STENOSIS: Status: ACTIVE | Noted: 2022-12-22

## 2022-12-22 LAB
ANION GAP SERPL CALCULATED.3IONS-SCNC: 10.8 MMOL/L (ref 5–15)
BUN SERPL-MCNC: 13 MG/DL (ref 8–23)
BUN/CREAT SERPL: 16.3 (ref 7–25)
CALCIUM SPEC-SCNC: 8.6 MG/DL (ref 8.6–10.5)
CHLORIDE SERPL-SCNC: 103 MMOL/L (ref 98–107)
CO2 SERPL-SCNC: 25.2 MMOL/L (ref 22–29)
CREAT SERPL-MCNC: 0.8 MG/DL (ref 0.57–1)
DEPRECATED RDW RBC AUTO: 46.3 FL (ref 37–54)
EGFRCR SERPLBLD CKD-EPI 2021: 71.9 ML/MIN/1.73
ERYTHROCYTE [DISTWIDTH] IN BLOOD BY AUTOMATED COUNT: 12.9 % (ref 12.3–15.4)
GLUCOSE SERPL-MCNC: 112 MG/DL (ref 65–99)
HCT VFR BLD AUTO: 32.9 % (ref 34–46.6)
HGB BLD-MCNC: 11.7 G/DL (ref 12–15.9)
MCH RBC QN AUTO: 34.4 PG (ref 26.6–33)
MCHC RBC AUTO-ENTMCNC: 35.6 G/DL (ref 31.5–35.7)
MCV RBC AUTO: 96.8 FL (ref 79–97)
PLATELET # BLD AUTO: 176 10*3/MM3 (ref 140–450)
PMV BLD AUTO: 11.6 FL (ref 6–12)
POTASSIUM SERPL-SCNC: 4 MMOL/L (ref 3.5–5.2)
RBC # BLD AUTO: 3.4 10*6/MM3 (ref 3.77–5.28)
SODIUM SERPL-SCNC: 139 MMOL/L (ref 136–145)
VIT B12 BLD-MCNC: >2000 PG/ML (ref 211–946)
WBC NRBC COR # BLD: 12.69 10*3/MM3 (ref 3.4–10.8)

## 2022-12-22 PROCEDURE — 25010000002 ENOXAPARIN PER 10 MG: Performed by: INTERNAL MEDICINE

## 2022-12-22 PROCEDURE — 97162 PT EVAL MOD COMPLEX 30 MIN: CPT

## 2022-12-22 PROCEDURE — 94799 UNLISTED PULMONARY SVC/PX: CPT

## 2022-12-22 PROCEDURE — 80048 BASIC METABOLIC PNL TOTAL CA: CPT | Performed by: INTERNAL MEDICINE

## 2022-12-22 PROCEDURE — 94664 DEMO&/EVAL PT USE INHALER: CPT

## 2022-12-22 PROCEDURE — 85027 COMPLETE CBC AUTOMATED: CPT | Performed by: NURSE PRACTITIONER

## 2022-12-22 PROCEDURE — 94761 N-INVAS EAR/PLS OXIMETRY MLT: CPT

## 2022-12-22 PROCEDURE — 97530 THERAPEUTIC ACTIVITIES: CPT

## 2022-12-22 PROCEDURE — 25010000002 FUROSEMIDE PER 20 MG: Performed by: INTERNAL MEDICINE

## 2022-12-22 PROCEDURE — 25010000002 CEFTRIAXONE PER 250 MG: Performed by: HOSPITALIST

## 2022-12-22 PROCEDURE — 99232 SBSQ HOSP IP/OBS MODERATE 35: CPT | Performed by: NURSE PRACTITIONER

## 2022-12-22 PROCEDURE — 82607 VITAMIN B-12: CPT | Performed by: NURSE PRACTITIONER

## 2022-12-22 RX ORDER — FUROSEMIDE 40 MG/1
40 TABLET ORAL DAILY
Status: DISCONTINUED | OUTPATIENT
Start: 2022-12-23 | End: 2022-12-29 | Stop reason: HOSPADM

## 2022-12-22 RX ADMIN — FUROSEMIDE 40 MG: 10 INJECTION, SOLUTION INTRAMUSCULAR; INTRAVENOUS at 12:22

## 2022-12-22 RX ADMIN — Medication 10 ML: at 08:59

## 2022-12-22 RX ADMIN — METOPROLOL TARTRATE 25 MG: 25 TABLET, FILM COATED ORAL at 12:22

## 2022-12-22 RX ADMIN — ENOXAPARIN SODIUM 70 MG: 100 INJECTION SUBCUTANEOUS at 12:22

## 2022-12-22 RX ADMIN — CARBIDOPA AND LEVODOPA 2 TABLET: 25; 100 TABLET ORAL at 21:59

## 2022-12-22 RX ADMIN — PAROXETINE HYDROCHLORIDE HEMIHYDRATE 20 MG: 20 TABLET, FILM COATED ORAL at 06:18

## 2022-12-22 RX ADMIN — Medication 10 ML: at 21:59

## 2022-12-22 RX ADMIN — CEFTRIAXONE 2 G: 2 INJECTION, POWDER, FOR SOLUTION INTRAMUSCULAR; INTRAVENOUS at 17:12

## 2022-12-22 RX ADMIN — SUCRALFATE 1 G: 1 TABLET ORAL at 21:59

## 2022-12-22 RX ADMIN — ASPIRIN 81 MG: 81 TABLET, COATED ORAL at 08:59

## 2022-12-22 RX ADMIN — BUDESONIDE AND FORMOTEROL FUMARATE DIHYDRATE 2 PUFF: 160; 4.5 AEROSOL RESPIRATORY (INHALATION) at 08:34

## 2022-12-22 RX ADMIN — CARBIDOPA AND LEVODOPA 2 TABLET: 25; 100 TABLET ORAL at 17:12

## 2022-12-22 RX ADMIN — SUCRALFATE 1 G: 1 TABLET ORAL at 12:22

## 2022-12-22 RX ADMIN — CARBIDOPA AND LEVODOPA 2 TABLET: 25; 100 TABLET ORAL at 12:22

## 2022-12-22 RX ADMIN — FUROSEMIDE 40 MG: 10 INJECTION, SOLUTION INTRAMUSCULAR; INTRAVENOUS at 00:03

## 2022-12-22 RX ADMIN — FLUTICASONE PROPIONATE 2 SPRAY: 50 SPRAY, METERED NASAL at 08:59

## 2022-12-22 RX ADMIN — BUDESONIDE AND FORMOTEROL FUMARATE DIHYDRATE 2 PUFF: 160; 4.5 AEROSOL RESPIRATORY (INHALATION) at 20:02

## 2022-12-22 RX ADMIN — METOPROLOL TARTRATE 25 MG: 25 TABLET, FILM COATED ORAL at 21:59

## 2022-12-22 RX ADMIN — PANTOPRAZOLE SODIUM 40 MG: 40 TABLET, DELAYED RELEASE ORAL at 08:59

## 2022-12-22 RX ADMIN — CARBIDOPA AND LEVODOPA 2 TABLET: 25; 100 TABLET ORAL at 08:59

## 2022-12-22 RX ADMIN — SUCRALFATE 1 G: 1 TABLET ORAL at 17:12

## 2022-12-22 RX ADMIN — SUCRALFATE 1 G: 1 TABLET ORAL at 08:59

## 2022-12-22 RX ADMIN — GABAPENTIN 400 MG: 400 CAPSULE ORAL at 08:59

## 2022-12-22 RX ADMIN — ENOXAPARIN SODIUM 70 MG: 100 INJECTION SUBCUTANEOUS at 00:03

## 2022-12-22 NOTE — PLAN OF CARE
Goal Outcome Evaluation:  Plan of Care Reviewed With: patient           Outcome Evaluation: Pt is a 88 yo F who was admitted with PNA. Pt presents to PT with impaired functional mobility and gait secondary to generalized weakness, impaired balance, and decreased activity tolerance. Pt may benefit from skilled PT to address strength, mobility, and gait.

## 2022-12-22 NOTE — PLAN OF CARE
Problem: Adult Inpatient Plan of Care  Goal: Plan of Care Review  Outcome: Ongoing, Progressing  Flowsheets (Taken 12/22/2022 1639)  Progress: improving  Outcome Evaluation: VSS. Weaned to RA. IV abx continued. ID consulted for e.coli bacteremia. IV lasix switched to PO lasix for tomorrow AM. Pt stable and needs met at this time.

## 2022-12-22 NOTE — THERAPY EVALUATION
Patient Name: Trang Liu  : 1935    MRN: [id number]                              Today's Date: 2022       Admit Date: 2022    Visit Dx:     ICD-10-CM ICD-9-CM   1. Pneumonia due to infectious organism, unspecified laterality, unspecified part of lung  J18.9 486   2. Atrial fibrillation with RVR ([facility])  I48.91 427.31     Patient Active Problem List   Diagnosis   • Upper GI bleed   • Diastolic dysfunction   • GERD (gastroesophageal reflux disease)   • Hypertension   • Parkinsons ([facility])   • Rheumatoid arthritis ([facility])   • Anemia, posthemorrhagic, acute   • Gastroesophageal reflux disease   • Urinary tract infection, site not specified   • Rheumatoid arthritis ([facility])   • Atrial fibrillation ([facility])   • Abdominal pain   • Anxiety   • Hypomagnesemia   • Pneumonia due to infectious organism, unspecified laterality, unspecified part of lung   • Sepsis ([facility])   • E coli bacteremia   • Acute respiratory failure with hypoxia ([facility])   • Aortic valve stenosis     Past Medical History:   Diagnosis Date   • Anxiety    • Aortic valve insufficiency    • Ataxia    • Diastolic dysfunction    • GERD (gastroesophageal reflux disease)    • H/O hernia repair     umbilical   • History of hip replacement     left   • Hypertension    • Hypokalemia 2019   • Insomnia    • Mitral regurgitation    • Parkinsons (HCC)    • Rheumatoid arthritis ([facility])    • Rotator cuff disorder     left side, also old fracture, doesn';t use this arm due to pain   • Spastic colon    • Tremor    • Tricuspid valve regurgitation    • UTI (urinary tract infection)     frequent   • Wears glasses      Past Surgical History:   Procedure Laterality Date   • APPENDECTOMY     • CATARACT EXTRACTION     • ENDOSCOPY N/A 2018    LA Grade B reflux esophagitis, HH    • HERNIA REPAIR     • HYSTERECTOMY        General Information     Row Name 22 1628          Physical Therapy Time and Intention    Document Type evaluation  -     Mode of Treatment  individual therapy;physical therapy  -     Row Name 12/22/22 1628          General Information    Prior Level of Function independent:;gait;transfer;bed mobility  walks with walker  -     Existing Precautions/Restrictions fall  -     Barriers to Rehab medically complex  -     Row Name 12/22/22 1628          Living Environment    People in Home child(jonathan), adult  -     Row Name 12/22/22 1628          Cognition    Orientation Status (Cognition) oriented x 3  -     Row Name 12/22/22 1628          Safety Issues, Functional Mobility    Impairments Affecting Function (Mobility) balance;pain;strength;endurance/activity tolerance  -           User Key  (r) = Recorded By, (t) = Taken By, (c) = Cosigned By    Initials Name Provider Type     Yesica Kahn, PT Physical Therapist               Mobility     Row Name 12/22/22 1632          Bed Mobility    Bed Mobility supine-sit;sit-supine  -     Supine-Sit [place] (Bed Mobility) verbal cues;nonverbal cues (demo/gesture);contact guard  -     Sit-Supine [place] (Bed Mobility) verbal cues;nonverbal cues (demo/gesture);minimum assist (75% patient effort)  -     Assistive Device (Bed Mobility) bed rails;head of bed elevated  -     Row Name 12/22/22 1632          Sit-Stand Transfer    Sit-Stand [place] (Transfers) verbal cues;nonverbal cues (demo/gesture);moderate assist (50% patient effort)  -     Assistive Device (Sit-Stand Transfers) walker, front-wheeled  -     Row Name 12/22/22 1632          Gait/Stairs (Locomotion)    [place]rie Level (Gait) verbal cues;nonverbal cues (demo/gesture);minimum assist (75% patient effort)  -     Assistive Device (Gait) walker, front-wheeled  -     Distance in Feet (Gait) 2 side steps to HOB  -     Deviations/Abnormal Patterns (Gait) marija decreased;gait speed decreased;stride length decreased  -     Bilateral Gait Deviations forward flexed posture  -           User Key  (r) = Recorded By,  (t) = Taken By, (c) = Cosigned By    Initials Name Provider Type     Yesica Kahn PT Physical Therapist               Obj/Interventions     Row Name 12/22/22 1634          Range of Motion Comprehensive    Comment, General Range of Motion R UE with decreased ROM secondary to pain and weakness  -     Row Name 12/22/22 1634          Strength Comprehensive (MMT)    Comment, General Manual Muscle Testing (MMT) Assessment generalized weakness noted with functional mobility, B LE grossly >/=3/5  -     Row Name 12/22/22 1634          Motor Skills    Therapeutic Exercise --  5 reps B LE AP, LAQ, and seated marches  -     Row Name 12/22/22 1634          Balance    Balance Assessment standing static balance;standing dynamic balance  -     Static Standing Balance verbal cues;non-verbal cues (demo/gesture);minimal assist  -     Dynamic Standing Balance verbal cues;non-verbal cues (demo/gesture);minimal assist  -           User Key  (r) = Recorded By, (t) = Taken By, (c) = Cosigned By    Initials Name Provider Type     Yesica Kahn, PT Physical Therapist               Goals/Plan     Row Name 12/22/22 1644          Bed Mobility Goal 1 (PT)    Activity/Assistive Device (Bed Mobility Goal 1, PT) bed mobility activities, all  -     Rankin Level/Cues Needed (Bed Mobility Goal 1, PT) supervision required  -     Time Frame (Bed Mobility Goal 1, PT) 1 week  -[facility] Name 12/22/22 1644          Transfer Goal 1 (PT)    Activity/Assistive Device (Transfer Goal 1, PT) transfers, all;walker, rolling  -     Rankin Level/Cues Needed (Transfer Goal 1, PT) supervision required  -     Time Frame (Transfer Goal 1, PT) 1 week  -[facility] Name 12/22/22 1644          Gait Training Goal 1 (PT)    Activity/Assistive Device (Gait Training Goal 1, PT) gait (walking locomotion);walker, rolling  -     Rankin Level (Gait Training Goal 1, PT) supervision required  -     Distance (Gait Training  Goal 1, PT) 100  -CH     Time Frame (Gait Training Goal 1, PT) 1 week  -     Row Name 12/22/22 1644          Therapy Assessment/Plan (PT)    Planned Therapy Interventions (PT) balance training;bed mobility training;gait training;home exercise program;patient/family education;strengthening;transfer training  -           User Key  (r) = Recorded By, (t) = Taken By, (c) = Cosigned By    Initials Name Provider Type     Yesica Kahn, PT Physical Therapist               Clinical Impression     Row Name 12/22/22 1641          Pain    Pretreatment Pain Rating 0/10 - no pain  -     Posttreatment Pain Rating 0/10 - no pain  -     Row Name 12/22/22 1641          Plan of Care Review    Plan of Care Reviewed With patient  -     Outcome Evaluation Pt is a 88 yo F who was admitted with PNA. Pt presents to PT with impaired functional mobility and gait secondary to generalized weakness, impaired balance, and decreased activity tolerance. Pt may benefit from skilled PT to address strength, mobility, and gait.  -     Row Name 12/22/22 1641          Therapy Assessment/Plan (PT)    Patient/Family Therapy Goals Statement (PT) to return to [facility]  -     Rehab Potential (PT) good, to achieve stated therapy goals  -     Criteria for Skilled Interventions Met (PT) skilled treatment is necessary  -     Therapy Frequency (PT) 5 times/wk  -     Row Name 12/22/22 1641          Positioning and Restraints    Pre-Treatment Position in bed  -     Post Treatment Position bed  -     In Bed supine;call light within reach;encouraged to call for assist;exit alarm on  -           User Key  (r) = Recorded By, (t) = Taken By, (c) = Cosigned By    Initials Name Provider Type     Yesica Kahn, PT Physical Therapist               Outcome Measures     Row Name 12/22/22 1646          How much help from another person do you currently need...    Turning from your back to your side while in flat bed without using bedrails? 3   -CH     Moving from lying on back to sitting on the side of a flat bed without bedrails? 3  -CH     Moving to and from a bed to a chair (including a wheelchair)? 3  -CH     Standing up from a chair using your arms (e.g., wheelchair, bedside chair)? 3  -CH     Climbing 3-5 steps with a railing? 1  -CH     To walk in hospital room? 1  -CH     AM-PAC 6 Clicks Score (PT) 14  -CH     Highest level of mobility 4 --> Transferred to chair/commode  -     Row Name 12/22/22 1645          Functional Assessment    Outcome Measure Options AM-PAC 6 Clicks Basic Mobility (PT)  -           User Key  (r) = Recorded By, (t) = Taken By, (c) = Cosigned By    Initials Name Provider Type     Yesica Kahn PT Physical Therapist                             Physical Therapy Education     Title: PT OT SLP Therapies (Done)     Topic: Physical Therapy (Done)     Point: Mobility training (Done)     Learning Progress Summary           Patient Acceptance, E,TB,D, VU,NR by  at 12/22/2022 1645    Acceptance, E,TB, VU by XO at 12/20/2022 2226                   Point: Home exercise program (Done)     Learning Progress Summary           Patient Acceptance, E,TB,D, VU,NR by  at 12/22/2022 1645    Acceptance, E,TB, VU by XO at 12/20/2022 2226                   Point: Body mechanics (Done)     Learning Progress Summary           Patient Acceptance, E,TB,D, VU,NR by  at 12/22/2022 1645    Acceptance, E,TB, VU by XO at 12/20/2022 2226                   Point: Precautions (Done)     Learning Progress Summary           Patient Acceptance, E,TB,D, VU,NR by  at 12/22/2022 1645    Acceptance, E,TB, VU by XO at 12/20/2022 2226                               User Key     Initials Effective Dates Name Provider Type Discipline     06/16/21 -  Yesica Kahn PT Physical Therapist PT    XO 09/22/22 -  Faye Lisa RN Registered Nurse Nurse              PT Recommendation and Plan  Planned Therapy Interventions (PT): balance training, bed  mobility training, gait training, home exercise program, patient/family education, strengthening, transfer training  Plan of Care Reviewed With: patient  Outcome Evaluation: Pt is a 86 yo F who was admitted with PNA. Pt presents to PT with impaired functional mobility and gait secondary to generalized weakness, impaired balance, and decreased activity tolerance. Pt may benefit from skilled PT to address strength, mobility, and gait.     Time Calculation:    PT Charges     Row Name 12/22/22 1645             Time Calculation    Start Time 0940  -      Stop Time 0952  -      Time Calculation (min) 12 min  -      PT Received On 12/22/22  -      PT - Next Appointment 12/23/22  -      PT Goal Re-Cert Due Date 12/29/22  -         Time Calculation- PT    Total Timed Code Minutes- PT 8 minute(s)  -         Timed Charges    03748 - PT Therapeutic Activity Minutes 8  -CH         Total Minutes    Timed Charges Total Minutes 8  -CH       Total Minutes 8  -CH            User Key  (r) = Recorded By, (t) = Taken By, (c) = Cosigned By    Initials Name Provider Type     Yesica Kahn, PT Physical Therapist              Therapy Charges for Today     Code Description Service Date Service Provider Modifiers Qty    93146974867  PT THERAPEUTIC ACT EA 15 MIN 12/22/2022 Yesica Kahn, PT GP 1    16409361092 HC PT EVAL MOD COMPLEXITY 2 12/22/2022 Yesica aKhn, PT GP 1          PT G-Codes  Outcome Measure Options: AM-PAC 6 Clicks Basic Mobility (PT)  AM-PAC 6 Clicks Score (PT): 14  PT Discharge Summary  Anticipated Discharge Disposition (PT): skilled nursing facility    Yesica Kahn PT  12/22/2022

## 2022-12-22 NOTE — PROGRESS NOTES
Patient Name: Trang Liu  :1935  87 y.o.      Patient Care Team:  Nayla Higginbotham APRN as PCP - General (Family Medicine)    Chief Complaint: follow up atrial fibrillation, heart failure, bacteremia     Interval History: she is resting in bed. She is on room air. Complains of head ache.        Objective   Vital Signs  Temp:  [97.6 °F (36.4 °C)-100 °F (37.8 °C)] 100 °F (37.8 °C)  Heart Rate:  [] 106  Resp:  [16-20] 16  BP: (106-125)/(52-74) 125/74    Intake/Output Summary (Last 24 hours) at 2022 1204  Last data filed at 2022 1112  Gross per 24 hour   Intake 0 ml   Output 1500 ml   Net -1500 ml     Flowsheet Rows    Flowsheet Row First Filed Value   Admission Height 167.6 cm (66\") Documented at 2022 1706   Admission Weight 66.3 kg (146 lb 1.6 oz) Documented at 2022          Physical Exam:   General Appearance:    Alert, cooperative, in no acute distress   Lungs:     Clear to auscultation.  Normal respiratory effort and rate.      Heart:    irregular rhythm and normal rate, normal S1 and S2, no murmurs, gallops or rubs.     Chest Wall:    No abnormalities observed   Abdomen:     Soft, nontender, positive bowel sounds.     Extremities:   no cyanosis, clubbing or edema.  No marked joint deformities.  Adequate musculoskeletal strength.       Results Review:    Results from last 7 days   Lab Units 22  0458   SODIUM mmol/L 139   POTASSIUM mmol/L 4.0   CHLORIDE mmol/L 103   CO2 mmol/L 25.2   BUN mg/dL 13   CREATININE mg/dL 0.80   GLUCOSE mg/dL 112*   CALCIUM mg/dL 8.6     Results from last 7 days   Lab Units 22  1627 22  0126 22  1820   TROPONIN T ng/mL 0.019 0.033* 0.018     Results from last 7 days   Lab Units 22  0458   WBC 10*3/mm3 12.69*   HEMOGLOBIN g/dL 11.7*   HEMATOCRIT % 32.9*   PLATELETS 10*3/mm3 176         Results from last 7 days   Lab Units 22  1610   MAGNESIUM mg/dL 1.6                   Medication Review:    aspirin, 81 mg, Oral, Daily  budesonide-formoterol, 2 puff, Inhalation, BID - RT  carbidopa-levodopa, 2 tablet, Oral, 4x Daily  cefTRIAXone, 2 g, Intravenous, Q24H  enoxaparin, 1 mg/kg, Subcutaneous, Q12H  fluticasone, 2 spray, Nasal, Daily  furosemide, 40 mg, Intravenous, Q12H  gabapentin, 400 mg, Oral, Daily  pantoprazole, 40 mg, Oral, Daily  PARoxetine, 20 mg, Oral, QAM  sodium chloride, 10 mL, Intravenous, Q12H  sucralfate, 1 g, Oral, 4x Daily              Assessment & Plan   1. Acute hypoxic respiratory failure - multifactorial. Being treated for pneumonia as well as heart failure  2. Bacteremia   3. Atrial fibrillation (chronicity unknown) - HR modestly improved, predominantly in the low 100's. Status post 625 mcg total IV digoxin. BP marginal - trial low dose beta blocker. Amlodipine stopped. CHADs2 Vasc score of 5. She is on therapeutic enoxaparin for now.  4. Acute diastolic heart failure, secondary to atrial fibrillation with RVR - on IV lasix.   5. Moderate aortic valve stenosis  6. Parkinson's disease  7. Electrolyte imbalance  8. GERD    Transition to oral diuretics in AM. Add low dose beta blocker.     JODY Staton  [facility]  12/22/22  12:04 EST

## 2022-12-22 NOTE — PROGRESS NOTES
Name: Trang Liu ADMIT: 2022   : 1935  PCP: Nayla Higginbotham APRN    MRN: [id number] LOS: 2 days   AGE/SEX: 87 y.o. female  ROOM: [facility]     Subjective   Subjective   Asleep on exam. Sats 94% on O2 1L NC. BP stable. TMax 100    Review of Systems   Unable to perform ROS: Other        Objective   Objective   Vital Signs  Temp:  [97.6 °F (36.4 °C)-100 °F (37.8 °C)] 100 °F (37.8 °C)  Heart Rate:  [] 106  Resp:  [16-20] 16  BP: (106-125)/(52-74) 125/74  SpO2:  [96 %-100 %] 96 %  on  Flow (L/min):  [1-2] 1;   Device (Oxygen Therapy): nasal cannula  Body mass index is 23.57 kg/m².  Physical Exam  Vitals and nursing note reviewed.   Constitutional:       General: She is sleeping. She is not in acute distress.  HENT:      Head: Normocephalic.      Mouth/Throat:      Lips: Pink.   Eyes:      General: Lids are normal.   Cardiovascular:      Rate and Rhythm: Tachycardia present.      Comments: HR 100s  Pulmonary:      Effort: Pulmonary effort is normal. No respiratory distress.   Abdominal:      Palpations: Abdomen is soft.   Musculoskeletal:      Cervical back: Neck supple.      Right lower leg: No edema.      Left lower leg: No edema.   Skin:     General: Skin is dry.   Neurological:      Comments: Sleeping       Results Review     I reviewed the patient's new clinical results.  Results from last 7 days   Lab Units 22  0458 22  0506 22  1610   WBC 10*3/mm3 12.69* 13.62* 13.40*   HEMOGLOBIN g/dL 11.7* 11.3* 13.1   PLATELETS 10*3/mm3 176 143 138*     Results from last 7 days   Lab Units 22  0458 22  0506 22  1610   SODIUM mmol/L 139 137 138   POTASSIUM mmol/L 4.0 4.4 2.9*   CHLORIDE mmol/L 103 105 100   CO2 mmol/L 25.2 23.0 24.3   BUN mg/dL 13 15 14   CREATININE mg/dL 0.80 0.79 0.74   GLUCOSE mg/dL 112* 116* 109*   EGFR mL/min/1.73 71.9 73.0 78.9     Results from last 7 days   Lab Units 22  0506 22  1610   ALBUMIN g/dL 3.10* 3.30*   BILIRUBIN  mg/dL 0.6 0.9   ALK PHOS U/L 51 52   AST (SGOT) U/L 22 16   ALT (SGPT) U/L 6 17     Results from last 7 days   Lab Units 12/22/22  0458 12/21/22  0506 12/20/22  1610   CALCIUM mg/dL 8.6 8.9 9.5   ALBUMIN g/dL  --  3.10* 3.30*   MAGNESIUM mg/dL  --   --  1.6     Results from last 7 days   Lab Units 12/20/22  1820 12/20/22  1610   PROCALCITONIN ng/mL  --  0.67*   LACTATE mmol/L 2.0  --      No results found for: HGBA1C, POCGLU    No radiology results for the last day  Scheduled Medications  aspirin, 81 mg, Oral, Daily  budesonide-formoterol, 2 puff, Inhalation, BID - RT  carbidopa-levodopa, 2 tablet, Oral, 4x Daily  cefTRIAXone, 2 g, Intravenous, Q24H  enoxaparin, 1 mg/kg, Subcutaneous, Q12H  fluticasone, 2 spray, Nasal, Daily  furosemide, 40 mg, Intravenous, Q12H  gabapentin, 400 mg, Oral, Daily  pantoprazole, 40 mg, Oral, Daily  PARoxetine, 20 mg, Oral, QAM  sodium chloride, 10 mL, Intravenous, Q12H  sucralfate, 1 g, Oral, 4x Daily    Infusions   Diet  Diet: Cardiac Diets; Healthy Heart (2-3 Na+); Texture: Regular Texture (IDDSI 7); Fluid Consistency: Thin (IDDSI 0)       Assessment/Plan     Active Hospital Problems    Diagnosis  POA   • **Pneumonia due to infectious organism, unspecified laterality, unspecified part of lung [J18.9]  Yes   • Aortic valve stenosis [I35.0]  Yes   • E coli bacteremia [R78.81, B96.20]  Yes   • Acute respiratory failure with hypoxia (HCC) [J96.01]  Yes   • Sepsis (HCC) [A41.9]  Yes   • Atrial fibrillation (HCC) [I48.91]  Yes   • Gastroesophageal reflux disease [K21.9]  Yes   • GERD (gastroesophageal reflux disease) [K21.9]  Yes   • Hypertension [I10]  Yes   • Parkinsons (HCC) [G20]  Yes   • Rheumatoid arthritis (HCC) [M06.9]  Yes      Resolved Hospital Problems    Diagnosis Date Resolved POA   • Hypokalemia [E87.6] 12/22/2022 Yes       87 y.o. female admitted with Pneumonia due to infectious organism, unspecified laterality, unspecified part of lung.    Acute respiratory failure with  hypoxia / pneumonia due to infectious organism, unspecified laterality, unspecified part of lung: Wean O2 as tolerated; sats 94% on 1L NC on exam.  Procalcitonin 0.6. Urine antigens negative.  See plan below.  Inhaled corticosteroid/LAMA. Encourage IS       Bacteremia, E coli: Status post IV azithromycin.  IV ceftriaxone 2 g continued and one-time dose of IV Levaquin 750 mg provided for dual gram-negative julio c coverage. Culture growing 2/2 E coli. Consult ID to evaluate. Repeat BC collected. Add urine culture to UA         GERD (gastroesophageal reflux disease): Continue PPI, Carafate.       Hypertension: BP better.  Continue monitor blood pressure in the absence of antihypertensive agents.       Parkinsons ([facility]):  Complicating all problems.  Falls precautions.  Sinemet 4 times daily continued.       Rheumatoid arthritis (HCC):  Complicating all problems.  No signs of flare.         Macrocytosis: B12 > 2000, Hgb stable       Hypokalemia: Normalized following repletion       Atrial fibrillation (HCC): Cardiology following and managing rate control. S/P digoxin. Full anticoagulation w/lovenox. Monitor on telemetry       Sepsis (HCC): Possible multifactorial given complaints of dysuria vs pneumonia vs both.  Hemodynamically stable.  Follows cultures. ID consulted as above    AVS: Followed by Cardiology. Echo w/EF 58%, moderate AVS. Diuresis per Cardiology    PT eval pending     · Lovenox for DVT prophylaxis.  · Full code.  · Discussed with nursing staff.  · Anticipate discharge TBJODY Boland  [facility]  12/22/22  11:10 EST

## 2022-12-23 LAB
ANION GAP SERPL CALCULATED.3IONS-SCNC: 11.7 MMOL/L (ref 5–15)
BACTERIA SPEC AEROBE CULT: ABNORMAL
BACTERIA SPEC AEROBE CULT: ABNORMAL
BACTERIA SPEC AEROBE CULT: NO GROWTH
BUN SERPL-MCNC: 14 MG/DL (ref 8–23)
BUN/CREAT SERPL: 16.1 (ref 7–25)
CALCIUM SPEC-SCNC: 8.4 MG/DL (ref 8.6–10.5)
CHLORIDE SERPL-SCNC: 99 MMOL/L (ref 98–107)
CO2 SERPL-SCNC: 23.3 MMOL/L (ref 22–29)
CREAT SERPL-MCNC: 0.87 MG/DL (ref 0.57–1)
DEPRECATED RDW RBC AUTO: 48.5 FL (ref 37–54)
EGFRCR SERPLBLD CKD-EPI 2021: 65 ML/MIN/1.73
ERYTHROCYTE [DISTWIDTH] IN BLOOD BY AUTOMATED COUNT: 12.9 % (ref 12.3–15.4)
GLUCOSE SERPL-MCNC: 104 MG/DL (ref 65–99)
GRAM STN SPEC: ABNORMAL
GRAM STN SPEC: ABNORMAL
HCT VFR BLD AUTO: 33.6 % (ref 34–46.6)
HGB BLD-MCNC: 11.5 G/DL (ref 12–15.9)
ISOLATED FROM: ABNORMAL
ISOLATED FROM: ABNORMAL
MCH RBC QN AUTO: 34.2 PG (ref 26.6–33)
MCHC RBC AUTO-ENTMCNC: 34.2 G/DL (ref 31.5–35.7)
MCV RBC AUTO: 100 FL (ref 79–97)
PLATELET # BLD AUTO: 180 10*3/MM3 (ref 140–450)
PMV BLD AUTO: 11.8 FL (ref 6–12)
POTASSIUM SERPL-SCNC: 3.2 MMOL/L (ref 3.5–5.2)
POTASSIUM SERPL-SCNC: 4 MMOL/L (ref 3.5–5.2)
RBC # BLD AUTO: 3.36 10*6/MM3 (ref 3.77–5.28)
SODIUM SERPL-SCNC: 134 MMOL/L (ref 136–145)
WBC NRBC COR # BLD: 12.62 10*3/MM3 (ref 3.4–10.8)

## 2022-12-23 PROCEDURE — 94799 UNLISTED PULMONARY SVC/PX: CPT

## 2022-12-23 PROCEDURE — 85027 COMPLETE CBC AUTOMATED: CPT | Performed by: NURSE PRACTITIONER

## 2022-12-23 PROCEDURE — 94664 DEMO&/EVAL PT USE INHALER: CPT

## 2022-12-23 PROCEDURE — 94762 N-INVAS EAR/PLS OXIMTRY CONT: CPT

## 2022-12-23 PROCEDURE — 99223 1ST HOSP IP/OBS HIGH 75: CPT | Performed by: STUDENT IN AN ORGANIZED HEALTH CARE EDUCATION/TRAINING PROGRAM

## 2022-12-23 PROCEDURE — 25010000002 CEFTRIAXONE PER 250 MG: Performed by: HOSPITALIST

## 2022-12-23 PROCEDURE — 80048 BASIC METABOLIC PNL TOTAL CA: CPT | Performed by: NURSE PRACTITIONER

## 2022-12-23 PROCEDURE — 84132 ASSAY OF SERUM POTASSIUM: CPT | Performed by: STUDENT IN AN ORGANIZED HEALTH CARE EDUCATION/TRAINING PROGRAM

## 2022-12-23 PROCEDURE — 94761 N-INVAS EAR/PLS OXIMETRY MLT: CPT

## 2022-12-23 PROCEDURE — 25010000002 FUROSEMIDE PER 20 MG: Performed by: NURSE PRACTITIONER

## 2022-12-23 PROCEDURE — 25010000002 ENOXAPARIN PER 10 MG: Performed by: INTERNAL MEDICINE

## 2022-12-23 PROCEDURE — 97530 THERAPEUTIC ACTIVITIES: CPT

## 2022-12-23 PROCEDURE — 99232 SBSQ HOSP IP/OBS MODERATE 35: CPT | Performed by: NURSE PRACTITIONER

## 2022-12-23 RX ADMIN — CARBIDOPA AND LEVODOPA 2 TABLET: 25; 100 TABLET ORAL at 08:26

## 2022-12-23 RX ADMIN — POTASSIUM CHLORIDE 40 MEQ: 750 TABLET, EXTENDED RELEASE ORAL at 08:27

## 2022-12-23 RX ADMIN — Medication 10 ML: at 20:42

## 2022-12-23 RX ADMIN — ENOXAPARIN SODIUM 70 MG: 100 INJECTION SUBCUTANEOUS at 00:23

## 2022-12-23 RX ADMIN — CARBIDOPA AND LEVODOPA 2 TABLET: 25; 100 TABLET ORAL at 20:41

## 2022-12-23 RX ADMIN — CARBIDOPA AND LEVODOPA 2 TABLET: 25; 100 TABLET ORAL at 11:41

## 2022-12-23 RX ADMIN — Medication 10 ML: at 08:27

## 2022-12-23 RX ADMIN — ENOXAPARIN SODIUM 70 MG: 100 INJECTION SUBCUTANEOUS at 11:46

## 2022-12-23 RX ADMIN — METOPROLOL TARTRATE 25 MG: 25 TABLET, FILM COATED ORAL at 20:41

## 2022-12-23 RX ADMIN — ACETAMINOPHEN 650 MG: 325 TABLET, FILM COATED ORAL at 00:23

## 2022-12-23 RX ADMIN — GABAPENTIN 400 MG: 400 CAPSULE ORAL at 08:27

## 2022-12-23 RX ADMIN — CARBIDOPA AND LEVODOPA 2 TABLET: 25; 100 TABLET ORAL at 17:20

## 2022-12-23 RX ADMIN — PAROXETINE HYDROCHLORIDE HEMIHYDRATE 20 MG: 20 TABLET, FILM COATED ORAL at 06:50

## 2022-12-23 RX ADMIN — FUROSEMIDE 40 MG: 10 INJECTION, SOLUTION INTRAMUSCULAR; INTRAVENOUS at 00:23

## 2022-12-23 RX ADMIN — BUDESONIDE AND FORMOTEROL FUMARATE DIHYDRATE 2 PUFF: 160; 4.5 AEROSOL RESPIRATORY (INHALATION) at 19:40

## 2022-12-23 RX ADMIN — CEFTRIAXONE 2 G: 2 INJECTION, POWDER, FOR SOLUTION INTRAMUSCULAR; INTRAVENOUS at 15:38

## 2022-12-23 RX ADMIN — POTASSIUM CHLORIDE 40 MEQ: 750 TABLET, EXTENDED RELEASE ORAL at 15:38

## 2022-12-23 RX ADMIN — SUCRALFATE 1 G: 1 TABLET ORAL at 08:26

## 2022-12-23 RX ADMIN — SUCRALFATE 1 G: 1 TABLET ORAL at 11:41

## 2022-12-23 RX ADMIN — ASPIRIN 81 MG: 81 TABLET, COATED ORAL at 08:26

## 2022-12-23 RX ADMIN — BUDESONIDE AND FORMOTEROL FUMARATE DIHYDRATE 2 PUFF: 160; 4.5 AEROSOL RESPIRATORY (INHALATION) at 09:40

## 2022-12-23 RX ADMIN — FUROSEMIDE 40 MG: 40 TABLET ORAL at 08:26

## 2022-12-23 RX ADMIN — SUCRALFATE 1 G: 1 TABLET ORAL at 17:20

## 2022-12-23 RX ADMIN — PANTOPRAZOLE SODIUM 40 MG: 40 TABLET, DELAYED RELEASE ORAL at 08:27

## 2022-12-23 RX ADMIN — SUCRALFATE 1 G: 1 TABLET ORAL at 20:42

## 2022-12-23 RX ADMIN — FLUTICASONE PROPIONATE 2 SPRAY: 50 SPRAY, METERED NASAL at 08:29

## 2022-12-23 RX ADMIN — METOPROLOL TARTRATE 25 MG: 25 TABLET, FILM COATED ORAL at 08:27

## 2022-12-23 NOTE — THERAPY TREATMENT NOTE
Patient Name: Trang Liu  : 1935    MRN: [id number]                              Today's Date: 2022       Admit Date: 2022    Visit Dx:     ICD-10-CM ICD-9-CM   1. Pneumonia due to infectious organism, unspecified laterality, unspecified part of lung  J18.9 486   2. Atrial fibrillation with RVR ([facility])  I48.91 427.31     Patient Active Problem List   Diagnosis   • Upper GI bleed   • Diastolic dysfunction   • GERD (gastroesophageal reflux disease)   • Hypertension   • Parkinsons ([facility])   • Rheumatoid arthritis ([facility])   • Anemia, posthemorrhagic, acute   • Gastroesophageal reflux disease   • Urinary tract infection, site not specified   • Rheumatoid arthritis ([facility])   • Atrial fibrillation ([facility])   • Abdominal pain   • Anxiety   • Hypomagnesemia   • Pneumonia due to infectious organism, unspecified laterality, unspecified part of lung   • Sepsis ([facility])   • E coli bacteremia   • Acute respiratory failure with hypoxia ([facility])   • Aortic valve stenosis     Past Medical History:   Diagnosis Date   • Anxiety    • Aortic valve insufficiency    • Ataxia    • Diastolic dysfunction    • GERD (gastroesophageal reflux disease)    • H/O hernia repair     umbilical   • History of hip replacement     left   • Hypertension    • Hypokalemia 2019   • Insomnia    • Mitral regurgitation    • Parkinsons ([facility])    • Rheumatoid arthritis ([facility])    • Rotator cuff disorder     left side, also old fracture, doesn';t use this arm due to pain   • Spastic colon    • Tremor    • Tricuspid valve regurgitation    • UTI (urinary tract infection)     frequent   • Wears glasses      Past Surgical History:   Procedure Laterality Date   • APPENDECTOMY     • CATARACT EXTRACTION     • ENDOSCOPY N/A 2018    LA Grade B reflux esophagitis, HH    • HERNIA REPAIR     • HYSTERECTOMY        General Information     Row Name 22 7424          Physical Therapy Time and Intention    Document Type therapy note (daily note)  -      Mode of Treatment individual therapy;physical therapy  -     Row Name 12/23/22 1604          General Information    Existing Precautions/Restrictions fall  -     Row Name 12/23/22 1604          Cognition    Orientation Status (Cognition) oriented x 3  -     Row Name 12/23/22 1604          Safety Issues, Functional Mobility    Impairments Affecting Function (Mobility) balance;pain;strength;endurance/activity tolerance  -           User Key  (r) = Recorded By, (t) = Taken By, (c) = Cosigned By    Initials Name Provider Type     Yesica Kahn, ERNA Physical Therapist               Mobility     Row Name 12/23/22 1604          Bed Mobility    Bed Mobility supine-sit;sit-supine  -     Supine-Sit [place] (Bed Mobility) verbal cues;nonverbal cues (demo/gesture);minimum assist (75% patient effort)  -     Sit-Supine [place] (Bed Mobility) verbal cues;nonverbal cues (demo/gesture);minimum assist (75% patient effort)  -     Assistive Device (Bed Mobility) bed rails;head of bed elevated  -     Row Name 12/23/22 1604          Sit-Stand Transfer    Sit-Stand [place] (Transfers) verbal cues;nonverbal cues (demo/gesture);moderate assist (50% patient effort)  -     Assistive Device (Sit-Stand Transfers) walker, front-wheeled  -     Row Name 12/23/22 1604          Gait/Stairs (Locomotion)    [place] Level (Gait) verbal cues;nonverbal cues (demo/gesture);moderate assist (50% patient effort)  -     Assistive Device (Gait) walker, front-wheeled  -     Distance in Feet (Gait) 6 ft, (5 steps forward and back)  -     Deviations/Abnormal Patterns (Gait) marija decreased;gait speed decreased;stride length decreased  -     Bilateral Gait Deviations forward flexed posture  -           User Key  (r) = Recorded By, (t) = Taken By, (c) = Cosigned By    Initials Name Provider Type    Yesica De Souza PT Physical Therapist               Obj/Interventions     Row Name 12/23/22 1609           Motor Skills    Therapeutic Exercise --  5 reps B LE AP and LAQ  -     Row Name 12/23/22 1605          Balance    Balance Assessment sitting static balance  -     Static Sitting Balance verbal cues;non-verbal cues (demo/gesture);minimal assist  -     Comment, Balance pt with R lean in sitting repeated cues and assistance to return to midline  -           User Key  (r) = Recorded By, (t) = Taken By, (c) = Cosigned By    Initials Name Provider Type     Yesica Kahn, PT Physical Therapist               Goals/Plan    No documentation.                Clinical Impression     Row Name 12/23/22 1606          Pain    Pre/Posttreatment Pain Comment pt reports R UE pain (chronic)  -     Pain Intervention(s) Repositioned  -     Row Name 12/23/22 1606          Plan of Care Review    Plan of Care Reviewed With patient  -     Outcome Evaluation Pt required increased assistance with bed mobility, transfers and gait secondary to increased fatigue and weakness. Pt was able to take a few more steps this date but experienced some B knee bucklins secondary to weakness. PT will continue to follow to address strength, mobility, and gait. Pt may benefit from SNF upon discharge.  -     Row Name 12/23/22 1606          Positioning and Restraints    Pre-Treatment Position in bed  -     Post Treatment Position bed  -     In Bed supine;call light within reach;encouraged to call for assist;exit alarm on  -           User Key  (r) = Recorded By, (t) = Taken By, (c) = Cosigned By    Initials Name Provider Type     Yesica Kahn, PT Physical Therapist               Outcome Measures     Row Name 12/23/22 1610 12/23/22 0826       How much help from another person do you currently need...    Turning from your back to your side while in flat bed without using bedrails? 3  - 3  -LK    Moving from lying on back to sitting on the side of a flat bed without bedrails? 3  - 3  -LK    Moving to and from a bed to a  chair (including a wheelchair)? 2  -CH 3  -LK    Standing up from a chair using your arms (e.g., wheelchair, bedside chair)? 2  -CH 3  -LK    Climbing 3-5 steps with a railing? 1  -CH 1  -LK    To walk in hospital room? 2  -CH 1  -LK    AM-PAC 6 Clicks Score (PT) 13  -CH 14  -LK    Highest level of mobility 4 --> Transferred to chair/commode  -CH 4 --> Transferred to chair/commode  -LK    Row Name 12/23/22 1610          Functional Assessment    Outcome Measure Options AM-PAC 6 Clicks Basic Mobility (PT)  -           User Key  (r) = Recorded By, (t) = Taken By, (c) = Cosigned By    Initials Name Provider Type     Yesica Kahn PT Physical Therapist    Rula Anderson, RN Registered Nurse                             Physical Therapy Education     Title: PT OT SLP Therapies (Done)     Topic: Physical Therapy (Done)     Point: Mobility training (Done)     Learning Progress Summary           Patient Acceptance, E,TB,D, VU,NR by  at 12/23/2022 1610    Acceptance, E,TB,D, VU,NR by  at 12/22/2022 1645    Acceptance, E,TB, VU by XO at 12/20/2022 2226                   Point: Home exercise program (Done)     Learning Progress Summary           Patient Acceptance, E,TB,D, VU,NR by  at 12/23/2022 1610    Acceptance, E,TB,D, VU,NR by  at 12/22/2022 1645    Acceptance, E,TB, VU by XO at 12/20/2022 2226                   Point: Body mechanics (Done)     Learning Progress Summary           Patient Acceptance, E,TB,D, VU,NR by  at 12/23/2022 1610    Acceptance, E,TB,D, VU,NR by  at 12/22/2022 1645    Acceptance, E,TB, VU by XO at 12/20/2022 2226                   Point: Precautions (Done)     Learning Progress Summary           Patient Acceptance, E,TB,D, VU,NR by  at 12/23/2022 1610    Acceptance, E,TB,D, VU,NR by  at 12/22/2022 1645    Acceptance, E,TB, VU by XO at 12/20/2022 2226                               User Key     Initials Effective Dates Name Provider Type Discipline     06/16/21 -   Yesica Kahn PT Physical Therapist PT    XO 09/22/22 -  Faye Lisa RN Registered Nurse Nurse              PT Recommendation and Plan  Planned Therapy Interventions (PT): balance training, bed mobility training, gait training, home exercise program, patient/family education, strengthening, transfer training  Plan of Care Reviewed With: patient  Outcome Evaluation: Pt required increased assistance with bed mobility, transfers and gait secondary to increased fatigue and weakness. Pt was able to take a few more steps this date but experienced some B knee bucklins secondary to weakness. PT will continue to follow to address strength, mobility, and gait. Pt may benefit from SNF upon discharge.     Time Calculation:    PT Charges     Row Name 12/23/22 1610             Time Calculation    Start Time 1451  -      Stop Time 1507  -CH      Time Calculation (min) 16 min  -CH      PT Received On 12/23/22  -      PT - Next Appointment 12/27/22  -         Time Calculation- PT    Total Timed Code Minutes- PT 16 minute(s)  -CH         Timed Charges    51194 - PT Therapeutic Activity Minutes 16  -CH         Total Minutes    Timed Charges Total Minutes 16  -CH       Total Minutes 16  -CH            User Key  (r) = Recorded By, (t) = Taken By, (c) = Cosigned By    Initials Name Provider Type     Yesica Kahn, PT Physical Therapist              Therapy Charges for Today     Code Description Service Date Service Provider Modifiers Qty    55067473741  PT THERAPEUTIC ACT EA 15 MIN 12/22/2022 Yesica Kahn, PT GP 1    [id number]  PT EVAL MOD COMPLEXITY 2 12/22/2022 Yesica Kahn, PT GP 1    43120386423 HC PT THERAPEUTIC ACT EA 15 MIN 12/23/2022 Yesica aKhn, PT GP 1          PT G-Codes  Outcome Measure Options: AM-PAC 6 Clicks Basic Mobility (PT)  AM-PAC 6 Clicks Score (PT): 13  PT Discharge Summary  Anticipated Discharge Disposition (PT): skilled nursing facility    Yesica Kahn  PT  12/23/2022

## 2022-12-23 NOTE — PROGRESS NOTES
Patient Name: Trang Liu  :1935  87 y.o.      Patient Care Team:  Nayla Higginbotham APRN as PCP - General (Family Medicine)    Chief Complaint: follow up atrial fibrillation, heart failure, bacteremia     Interval History: she is resting in bed. Didn't sleep well last night.        Objective   Vital Signs  Temp:  [97.6 °F (36.4 °C)-100.7 °F (38.2 °C)] 99.3 °F (37.4 °C)  Heart Rate:  [] 89  Resp:  [17-18] 18  BP: (107-135)/(56-80) 112/80    Intake/Output Summary (Last 24 hours) at 2022 1215  Last data filed at 2022 0730  Gross per 24 hour   Intake 222 ml   Output 1850 ml   Net -1628 ml     Flowsheet Rows    Flowsheet Row First Filed Value   Admission Height 167.6 cm (66\") Documented at 2022 1706   Admission Weight 66.3 kg (146 lb 1.6 oz) Documented at 2022          Physical Exam:   General Appearance:    Alert, cooperative, in no acute distress   Lungs:     Clear to auscultation.  Normal respiratory effort and rate.      Heart:    irregular rhythm and normal rate, normal S1 and S2, no murmurs, gallops or rubs.     Chest Wall:    No abnormalities observed   Abdomen:     Soft, nontender, positive bowel sounds.     Extremities:   no cyanosis, clubbing or edema.  No marked joint deformities.  Adequate musculoskeletal strength.       Results Review:    Results from last 7 days   Lab Units 22  0522   SODIUM mmol/L 134*   POTASSIUM mmol/L 3.2*   CHLORIDE mmol/L 99   CO2 mmol/L 23.3   BUN mg/dL 14   CREATININE mg/dL 0.87   GLUCOSE mg/dL 104*   CALCIUM mg/dL 8.4*     Results from last 7 days   Lab Units 22  1627 22  0126 22  1820   TROPONIN T ng/mL 0.019 0.033* 0.018     Results from last 7 days   Lab Units 22  0522   WBC 10*3/mm3 12.62*   HEMOGLOBIN g/dL 11.5*   HEMATOCRIT % 33.6*   PLATELETS 10*3/mm3 180         Results from last 7 days   Lab Units 22  1610   MAGNESIUM mg/dL 1.6                   Medication Review:   aspirin, 81  mg, Oral, Daily  budesonide-formoterol, 2 puff, Inhalation, BID - RT  carbidopa-levodopa, 2 tablet, Oral, 4x Daily  cefTRIAXone, 2 g, Intravenous, Q24H  enoxaparin, 1 mg/kg, Subcutaneous, Q12H  fluticasone, 2 spray, Nasal, Daily  furosemide, 40 mg, Oral, Daily  gabapentin, 400 mg, Oral, Daily  metoprolol tartrate, 25 mg, Oral, Q12H  pantoprazole, 40 mg, Oral, Daily  PARoxetine, 20 mg, Oral, QAM  sodium chloride, 10 mL, Intravenous, Q12H  sucralfate, 1 g, Oral, 4x Daily              Assessment & Plan   1. Acute hypoxic respiratory failure - multifactorial. Being treated for pneumonia as well as heart failure  2. Bacteremia - ID consulted.   3. Atrial fibrillation (chronicity unknown) - HR modestly improved, predominantly in the low 100's. Status post 625 mcg total IV digoxin. BP marginal - trial low dose beta blocker. Amlodipine stopped. CHADs2 Vasc score of 5. She is on therapeutic enoxaparin for now. Consider transitioning to DOAC prior to discharge.   4. Acute diastolic heart failure, secondary to atrial fibrillation with RVR - on IV lasix.   5. Moderate aortic valve stenosis  6. Parkinson's disease  7. Electrolyte imbalance  8. GERD    Oral diuretics today. She is on room air.     HR well controlled on low dose beta blocker.       JODY Staton  [facility]  12/23/22  12:15 EST

## 2022-12-23 NOTE — PROGRESS NOTES
Name: Trang Liu ADMIT: 2022   : 1935  PCP: Nayla Higginbotham APRN    MRN: [id number] LOS: 3 days   AGE/SEX: 87 y.o. female  ROOM: [facility]     Subjective   Subjective   Sitting up on side of bed, she was working with PT when I enter the room.  She states that she is feeling \"a little bit better overall\" today.     Objective   Objective   Vital Signs  Temp:  [98 °F (36.7 °C)-100.7 °F (38.2 °C)] 99.3 °F (37.4 °C)  Heart Rate:  [78-92] 92  Resp:  [17-18] 17  BP: (112-137)/(56-89) 137/89  SpO2:  [93 %-100 %] 93 %  on   ;   Device (Oxygen Therapy): room air  Body mass index is 23.57 kg/m².  Physical Exam  Constitutional:       General: She is not in acute distress.     Appearance: Normal appearance.   Cardiovascular:      Rate and Rhythm: Regular rhythm. Tachycardia present.      Heart sounds: No murmur heard.    No gallop.   Pulmonary:      Effort: Pulmonary effort is normal. No respiratory distress.      Breath sounds: Normal breath sounds. No wheezing.   Abdominal:      General: Abdomen is flat. There is no distension.      Palpations: Abdomen is soft.      Tenderness: There is no abdominal tenderness.   Musculoskeletal:      Right lower leg: Edema present.      Left lower leg: Edema present.   Skin:     General: Skin is warm.      Coloration: Skin is not jaundiced.   Neurological:      General: No focal deficit present.      Mental Status: She is alert and oriented to person, place, and time.   Psychiatric:         Mood and Affect: Mood normal.         Behavior: Behavior normal.       Results Review     I reviewed the patient's new clinical results.  Results from last 7 days   Lab Units 22  0522 22  0458 22  0506 22  1610   WBC 10*3/mm3 12.62* 12.69* 13.62* 13.40*   HEMOGLOBIN g/dL 11.5* 11.7* 11.3* 13.1   PLATELETS 10*3/mm3 180 176 143 138*     Results from last 7 days   Lab Units 22  0522 22  0458 22  0506 22  1610   SODIUM mmol/L 134* 139  137 138   POTASSIUM mmol/L 3.2* 4.0 4.4 2.9*   CHLORIDE mmol/L 99 103 105 100   CO2 mmol/L 23.3 25.2 23.0 24.3   BUN mg/dL 14 13 15 14   CREATININE mg/dL 0.87 0.80 0.79 0.74   GLUCOSE mg/dL 104* 112* 116* 109*   EGFR mL/min/1.73 65.0 71.9 73.0 78.9     Results from last 7 days   Lab Units 12/21/22  0506 12/20/22  1610   ALBUMIN g/dL 3.10* 3.30*   BILIRUBIN mg/dL 0.6 0.9   ALK PHOS U/L 51 52   AST (SGOT) U/L 22 16   ALT (SGPT) U/L 6 17     Results from last 7 days   Lab Units 12/23/22  0522 12/22/22  0458 12/21/22  0506 12/20/22  1610   CALCIUM mg/dL 8.4* 8.6 8.9 9.5   ALBUMIN g/dL  --   --  3.10* 3.30*   MAGNESIUM mg/dL  --   --   --  1.6     Results from last 7 days   Lab Units 12/20/22  1820 12/20/22  1610   PROCALCITONIN ng/mL  --  0.67*   LACTATE mmol/L 2.0  --      No results found for: HGBA1C, POCGLU    No radiology results for the last day  Scheduled Medications  aspirin, 81 mg, Oral, Daily  budesonide-formoterol, 2 puff, Inhalation, BID - RT  carbidopa-levodopa, 2 tablet, Oral, 4x Daily  cefTRIAXone, 2 g, Intravenous, Q24H  enoxaparin, 1 mg/kg, Subcutaneous, Q12H  fluticasone, 2 spray, Nasal, Daily  furosemide, 40 mg, Oral, Daily  gabapentin, 400 mg, Oral, Daily  metoprolol tartrate, 25 mg, Oral, Q12H  pantoprazole, 40 mg, Oral, Daily  PARoxetine, 20 mg, Oral, QAM  sodium chloride, 10 mL, Intravenous, Q12H  sucralfate, 1 g, Oral, 4x Daily    Infusions   Diet  Diet: Cardiac Diets; Healthy Heart (2-3 Na+); Texture: Regular Texture (IDDSI 7); Fluid Consistency: Thin (IDDSI 0)       Assessment/Plan     Active Hospital Problems    Diagnosis  POA   • **Pneumonia due to infectious organism, unspecified laterality, unspecified part of lung [J18.9]  Yes   • Aortic valve stenosis [I35.0]  Yes   • E coli bacteremia [R78.81, B96.20]  Yes   • Acute respiratory failure with hypoxia (H[facility]) [J96.01]  Yes   • Sepsis ([facility]CC) [A41.9]  Yes   • Atrial fibrillation ([facility]) [I48.91]  Yes   • Gastroesophageal reflux disease [K21.9]  Yes    • GERD (gastroesophageal reflux disease) [K21.9]  Yes   • Hypertension [I10]  Yes   • Parkinsons ([facility]CC) [G20]  Yes   • Rheumatoid arthritis (HCC) [M06.9]  Yes      Resolved Hospital Problems    Diagnosis Date Resolved POA   • Hypokalemia [E87.6] 12/22/2022 Yes       87 y.o. female admitted with Pneumonia due to infectious organism, unspecified laterality, unspecified part of lung.    E. coli bacteremia  Sepsis, resolved  -+ 12/20/2022; repeat 12/21/2022 negative  -ID following  -Complete ceftriaxone through 12/27/2022, per ID recommend IV given resistance profile  -Midline prior to DC    A. fib with RVR  -RC: Metoprolol 25 mg twice daily  -AC: Therapeutic Lovenox, will transition to DOAC prior to DC    Acute diastolic heart failure  -Secondary to A. fib with RVR  -Cardiology following  -Continue metoprolol 25 mg twice daily, Lasix 40 mg daily    Parkinson's disease  -Sinemet 4 times daily    Acute hypoxic respiratory failure, resolved  -Multifactorial: AHRF, acute heart failure  -Now on room air    · Lovenox for Afib full AC; transition to DOAC prior to DC  · Full code.  · Discussed with nursing staff.  · Anticipate discharge to SNU facility when arrangements made      Ashish Bland MD  [facility]  12/23/22  15:33 EST

## 2022-12-23 NOTE — CONSULTS
Referring Provider: Josh Petersen MD  Reason for Consultation: Bacteremia  Chief Complaint   Patient presents with   • Palpitations         Subjective   History of present illness: Patient is an 87-year-old female with past medical history of Parkinson's, rheumatoid arthritis, fibrillation, urethral stricture who presented for cough and weakness.  ID consulted for E. coli bacteremia.    Patient reports that she had over a week of worsening fatigue and decreased appetite.  States she had been having a minor cough and intermittent shortness of breath as well.  She originally thought she had a viral infection such as COVID or flu prior to presenting to the hospital.  She also has had intermittent dysuria and reports she has a history of urethral stricture that has required multiple dilations by urology with multiple UTIs.  States she was not having any vomiting but was feeling nauseous at times as well.  Reports low-grade fevers at home.    On presentation she was found to be slightly hypoxic requiring 2 L nasal cannula and chest x-ray showed right lower infiltrate that was likely representative of prior seen hiatal hernia.  Fever curve has been fluctuating but T-max yesterday of 100.7.  Procalcitonin was elevated at 0.67 and blood cultures were obtained that are now positive for E. coli.  Urine culture has been pending and respiratory viral panel was negative.    Today patient reports she is feeling slightly better but still feels fatigued.  Denies any abdominal pain, nausea, vomiting, diarrhea.  She was able to eat breakfast this morning.  Reports continued fever.  States her dysuria is about 50% better.    Past Medical History:   Diagnosis Date   • Anxiety    • Aortic valve insufficiency    • Ataxia    • Diastolic dysfunction    • GERD (gastroesophageal reflux disease)    • H/O hernia repair     umbilical   • History of hip replacement     left   • Hypertension    • Hypokalemia 2/12/2019   • Insomnia    • Mitral  regurgitation    • Parkinsons (HCC)    • Rheumatoid arthritis (HCC)    • Rotator cuff disorder     left side, also old fracture, doesn';t use this arm due to pain   • Spastic colon    • Tremor    • Tricuspid valve regurgitation    • UTI (urinary tract infection)     frequent   • Wears glasses        Past Surgical History:   Procedure Laterality Date   • APPENDECTOMY     • CATARACT EXTRACTION     • ENDOSCOPY N/A 5/30/2018    LA Grade B reflux esophagitis, HH    • HERNIA REPAIR     • HYSTERECTOMY         family history includes Arthritis in her father and mother; Diabetes in her father and mother; Heart disease in her father; Heart failure in her mother.     reports that she has never smoked. She has never used smokeless tobacco. She reports that she does not drink alcohol and does not use drugs.     Allergies   Allergen Reactions   • Cimetidine Unknown (See Comments)     unknown   • Codeine Itching   • Doxycycline Hives   • Guaifenesin & Derivatives Unknown (See Comments)     Unknown     • Nitrofuran Derivatives Itching   • Oxaprozin Itching   • Penicillins Itching     unknown   • Sulfa Antibiotics Rash     And itching       Medication:  Antibiotics:  Anti-Infectives (From admission, onward)    Ordered     Dose/Rate Route Frequency Start Stop    12/21/22 0941  cefTRIAXone (ROCEPHIN) 2 g in sodium chloride 0.9 % 100 mL IVPB-VTB        Ordering Provider: Isreal Barron MD    2 g  200 mL/hr over 30 Minutes Intravenous Every 24 Hours 12/21/22 1600 12/26/22 1559    12/21/22 0941  levoFLOXacin (LEVAQUIN) 750 mg/150 mL D5W (premix) (LEVAQUIN) 750 mg        Ordering Provider: Isreal Barron MD    750 mg Intravenous Once 12/21/22 1030 12/21/22 1250    12/20/22 1745  cefTRIAXone (ROCEPHIN) 1 g in sodium chloride 0.9 % 100 mL IVPB-VTB        Ordering Provider: Iglesia Hoffmann MD    1 g  200 mL/hr over 30 Minutes Intravenous Once 12/20/22 1747 12/20/22 1913          Review of Systems  Pertinent items are noted in  HPI, all other systems reviewed and negative    Objective     Physical Exam:   Vital Signs   Temp:  [97.6 °F (36.4 °C)-100.7 °F (38.2 °C)] 99.3 °F (37.4 °C)  Heart Rate:  [] 89  Resp:  [17-18] 18  BP: (107-135)/(56-80) 112/80    GENERAL: Awake and alert, in no acute distress.  Elderly appearing.  HEENT: Oropharynx is clear. Hearing is grossly normal.   EYES: PERRL. No conjunctival injection. No lid lag.   LYMPHATICS: No lymphadenopathy of the neck or inguinal regions.   HEART: Regular rate and regular rhythm. No peripheral edema.   LUNGS: Clear to auscultation anteriorly with normal respiratory effort.  Decreased breath sounds in the right lower.  GI: Soft, nontender, nondistended. No appreciable organomegaly.   SKIN: Warm and dry without cutaneous eruptions   PSYCHIATRIC: Appropriate mood, affect, insight, and judgment.     Results Review:   I reviewed the patient's new clinical results.  I reviewed the patient's new imaging results and agree with the interpretation.  I reviewed the patient's other test results and agree with the interpretation    Lab Results   Component Value Date    WBC 12.62 (H) 12/23/2022    HGB 11.5 (L) 12/23/2022    HCT 33.6 (L) 12/23/2022    .0 (H) 12/23/2022     12/23/2022       No results found for: VANCOPEAK, VANCOTROUGH, VANCORANDOM    Lab Results   Component Value Date    GLUCOSE 104 (H) 12/23/2022    BUN 14 12/23/2022    CREATININE 0.87 12/23/2022    EGFRIFNONA 81 03/11/2020    BCR 16.1 12/23/2022    CO2 23.3 12/23/2022    CALCIUM 8.4 (L) 12/23/2022    ALBUMIN 3.10 (L) 12/21/2022    LABIL2 2.1 06/16/2021    AST 22 12/21/2022    ALT 6 12/21/2022         Estimated Creatinine Clearance: 48.5 mL/min (by C-G formula based on SCr of 0.87 mg/dL).      Microbiology:  12/20 respiratory panel negative  12/20 2 out of 2 blood cultures E. coli  12/21 blood cultures no growth today  12/21 strep pneumo urine antigen negative  12/21 urine culture in process  12/21 Legionella urine  antigen negative    Radiology:  12/20 chest x-ray reviewed by me with right lower consolidation versus masslike density consistent with prior hiatal hernia.  Difficult to delineate any surrounding consolidation or infiltrate.    Assessment   #E. coli septicemia  #Suspected UTI  #Possible pneumonia  #Hiatal hernia  #History of ureteral stricture  #Parkinson's  #Rheumatoid arthritis     Possible pneumonia difficult to delineate given the history of hiatal hernia in the area of noted abnormality on chest x-ray.  She has had minor cough with slight hypoxia that has improved with treatment.  Procalcitonin was also elevated but this is in the setting of bacteremia.  E. coli bacteremia could indeed be from pneumonia but seems more likely to be related to a possible urinary source.  Urine culture is in process but wether its a urinary source or pulmonary source the therapy would be the same. Continue ceftriaxone 2 g daily given susceptibilities seen on blood culture.  Recommend 7-day course of ceftriaxone 2 g daily with end date 12/27.    Unfortunately I suspect IV to be the only option given her resistance profile and bacteremia.  Would avoid a PICC line however and consider a midline if approaching discharge.    Thank you for allowing me to be involved in the care of this patient. Infectious diseases will sign off at this time with antibiotics plan in place, but please call me at [phone] if any further ID questions or new ID concerns.

## 2022-12-23 NOTE — CASE MANAGEMENT/SOCIAL WORK
Continued Stay Note  [facility]     Patient Name: Trang Liu  MRN: [id number]  Today's Date: 12/23/2022    Admit Date: 12/20/2022    Plan: SNF. Daughter to call with choices. Would need pre cert which most likely unobtainable until 12/27 Tuesday. Per ID ceftriaxone 2 g daily with end date 12/27.   Discharge Plan     Row Name 12/23/22 1635       Plan    Plan SNF. Daughter to call with choices. Would need pre cert which most likely unobtainable until 12/27 Tuesday. Per ID ceftriaxone 2 g daily with end date 12/27.    Patient/Family in Agreement with Plan yes    Plan Comments Walked 6 ft with PT and they are recommending SNF. Call placed to daughter to discuss with VM left with return number. Pt will require pre cert which most insurance companies are closed until Tuesday. [facility] to follow up on Monday for placement. Saroj Jones RN-BC               Discharge Codes    No documentation.               Expected Discharge Date and Time     Expected Discharge Date Expected Discharge Time    Dec 26, 2022             Saroj Jones RN

## 2022-12-23 NOTE — PLAN OF CARE
Goal Outcome Evaluation:  Plan of Care Reviewed With: patient        Progress: improving  Outcome Evaluation: IV lasix given and pw in place. Have 100.7F fever and tylenol given, recheck 99.5F, SBP 110s, no sign of septic shock. Confused in the middle of the night, pt thought she is at home, re-oriented and pt went back to sleep. VSS, will continue to monitor.

## 2022-12-23 NOTE — PLAN OF CARE
Goal Outcome Evaluation:  Plan of Care Reviewed With: patient           Outcome Evaluation: Pt required increased assistance with bed mobility, transfers and gait secondary to increased fatigue and weakness. Pt was able to take a few more steps this date but experienced some B knee bucklins secondary to weakness. PT will continue to follow to address strength, mobility, and gait. Pt may benefit from SNF upon discharge.

## 2022-12-23 NOTE — PLAN OF CARE
Problem: Adult Inpatient Plan of Care  Goal: Plan of Care Review  Outcome: Ongoing, Progressing  Flowsheets (Taken 12/23/2022 1646)  Progress: no change  Plan of Care Reviewed With: patient  Outcome Evaluation: VSS. Increased confusion this morning, patient thought were at her house taking care of her and thought her Daughter was present when she has not been there all day/night. Dr. Norris aware of the confusion, no new orders. Mentation seems to improve throughout day. IV rocephin continued. K replaced recheck at 1940. Pt stable and needs met at this time.

## 2022-12-24 PROBLEM — J96.01 ACUTE RESPIRATORY FAILURE WITH HYPOXIA (HCC): Status: RESOLVED | Noted: 2022-12-21 | Resolved: 2022-12-24

## 2022-12-24 PROBLEM — A41.9 SEPSIS (HCC): Status: RESOLVED | Noted: 2022-12-20 | Resolved: 2022-12-24

## 2022-12-24 PROCEDURE — 99232 SBSQ HOSP IP/OBS MODERATE 35: CPT | Performed by: INTERNAL MEDICINE

## 2022-12-24 PROCEDURE — 94664 DEMO&/EVAL PT USE INHALER: CPT

## 2022-12-24 PROCEDURE — 94761 N-INVAS EAR/PLS OXIMETRY MLT: CPT

## 2022-12-24 PROCEDURE — 25010000002 CEFTRIAXONE PER 250 MG: Performed by: HOSPITALIST

## 2022-12-24 PROCEDURE — 94799 UNLISTED PULMONARY SVC/PX: CPT

## 2022-12-24 PROCEDURE — 25010000002 ENOXAPARIN PER 10 MG: Performed by: INTERNAL MEDICINE

## 2022-12-24 RX ADMIN — CARBIDOPA AND LEVODOPA 2 TABLET: 25; 100 TABLET ORAL at 12:47

## 2022-12-24 RX ADMIN — CARBIDOPA AND LEVODOPA 2 TABLET: 25; 100 TABLET ORAL at 18:08

## 2022-12-24 RX ADMIN — CEFTRIAXONE 2 G: 2 INJECTION, POWDER, FOR SOLUTION INTRAMUSCULAR; INTRAVENOUS at 16:20

## 2022-12-24 RX ADMIN — METOPROLOL TARTRATE 25 MG: 25 TABLET, FILM COATED ORAL at 08:07

## 2022-12-24 RX ADMIN — METOPROLOL TARTRATE 25 MG: 25 TABLET, FILM COATED ORAL at 21:03

## 2022-12-24 RX ADMIN — SUCRALFATE 1 G: 1 TABLET ORAL at 12:47

## 2022-12-24 RX ADMIN — ENOXAPARIN SODIUM 70 MG: 100 INJECTION SUBCUTANEOUS at 00:48

## 2022-12-24 RX ADMIN — ASPIRIN 81 MG: 81 TABLET, COATED ORAL at 08:07

## 2022-12-24 RX ADMIN — Medication 10 ML: at 21:04

## 2022-12-24 RX ADMIN — APIXABAN 5 MG: 5 TABLET, FILM COATED ORAL at 21:03

## 2022-12-24 RX ADMIN — BUDESONIDE AND FORMOTEROL FUMARATE DIHYDRATE 2 PUFF: 160; 4.5 AEROSOL RESPIRATORY (INHALATION) at 20:57

## 2022-12-24 RX ADMIN — BUDESONIDE AND FORMOTEROL FUMARATE DIHYDRATE 2 PUFF: 160; 4.5 AEROSOL RESPIRATORY (INHALATION) at 07:34

## 2022-12-24 RX ADMIN — Medication 10 ML: at 08:09

## 2022-12-24 RX ADMIN — CARBIDOPA AND LEVODOPA 2 TABLET: 25; 100 TABLET ORAL at 21:03

## 2022-12-24 RX ADMIN — GABAPENTIN 400 MG: 400 CAPSULE ORAL at 08:07

## 2022-12-24 RX ADMIN — SUCRALFATE 1 G: 1 TABLET ORAL at 21:03

## 2022-12-24 RX ADMIN — PAROXETINE HYDROCHLORIDE HEMIHYDRATE 20 MG: 20 TABLET, FILM COATED ORAL at 06:49

## 2022-12-24 RX ADMIN — CARBIDOPA AND LEVODOPA 2 TABLET: 25; 100 TABLET ORAL at 08:07

## 2022-12-24 RX ADMIN — ACETAMINOPHEN 650 MG: 325 TABLET, FILM COATED ORAL at 08:07

## 2022-12-24 RX ADMIN — FUROSEMIDE 40 MG: 40 TABLET ORAL at 08:07

## 2022-12-24 RX ADMIN — PANTOPRAZOLE SODIUM 40 MG: 40 TABLET, DELAYED RELEASE ORAL at 08:07

## 2022-12-24 RX ADMIN — FLUTICASONE PROPIONATE 2 SPRAY: 50 SPRAY, METERED NASAL at 08:09

## 2022-12-24 RX ADMIN — SUCRALFATE 1 G: 1 TABLET ORAL at 08:07

## 2022-12-24 RX ADMIN — APIXABAN 5 MG: 5 TABLET, FILM COATED ORAL at 12:47

## 2022-12-24 RX ADMIN — SUCRALFATE 1 G: 1 TABLET ORAL at 18:08

## 2022-12-24 NOTE — PLAN OF CARE
Goal Outcome Evaluation:    Progress: no change  Outcome Evaluation: Vitals stable. Orientation waning - sometimes A&O x4, sometimes only oriented to self. Better as the day progressed. Afib - rate controlled 90's - low 100's. IV Rocephin given. No c/o pain, nausea, SOA. Eliquis restarted. Discharge probable on Tuesday, 12/27, due to need for SNF placement. Patient stable and needs met at this time.

## 2022-12-24 NOTE — PROGRESS NOTES
[facility]  Progress note: 2022    Patient Identification:  Name:Trang Liu  Age:87 y.o.  Sex: female  :  1935  MRN: [id number]           CC:  Atrial fibrillation, valvular heart disease    Interval history:  Vital stable.  She is on Eliquis.  Still complains of dyspnea with mild activity.  Does not think she has been out of bed today.    Vital Signs:   Temp:  [97.7 °F (36.5 °C)-102 °F (38.9 °C)] 97.7 °F (36.5 °C)  Heart Rate:  [] 74  Resp:  [16] 16  BP: (118-142)/(66-91) 122/84    Intake/Output Summary (Last 24 hours) at 2022 1812  Last data filed at 2022 1620  Gross per 24 hour   Intake 710 ml   Output 200 ml   Net 510 ml       Physical Examination:    General Appearance No acute distress   Neck No adenopathy, supple, trachea midline, no thyromegaly, no carotid bruit, no JVD   Lungs  few expiratory rhonchi bilaterally,respirations regular, even and unlabored   Heart Regular rhythm and normal rate, normal S1 and S2, n 2/6 murmur, no gallop, no rub, no click   Chest wall No abnormalities observed   Abdomen Normal bowel sounds, no masses, no hepatomegaly, soft   Extremities no edema, no cyanosis, no redness   Neurological Alert and oriented x 3     Lab Review:  Personally reviewed the labs, radiology imaging and other cardiac procedures.   Results from last 7 days   Lab Units 22  1933 22  0522 22  0458 22  0506   SODIUM mmol/L  --  134*   < > 137   POTASSIUM mmol/L 4.0 3.2*   < > 4.4   CHLORIDE mmol/L  --  99   < > 105   CO2 mmol/L  --  23.3   < > 23.0   BUN mg/dL  --  14   < > 15   CREATININE mg/dL  --  0.87   < > 0.79   CALCIUM mg/dL  --  8.4*   < > 8.9   BILIRUBIN mg/dL  --   --   --  0.6   ALK PHOS U/L  --   --   --  51   ALT (SGPT) U/L  --   --   --  6   AST (SGOT) U/L  --   --   --  22   GLUCOSE mg/dL  --  104*   < > 116*    < > = values in this interval not displayed.     Results from last 7 days   Lab Units 22  6219  12/21/22  0126 12/20/22  1820   TROPONIN T ng/mL 0.019 0.033* 0.018     Results from last 7 days   Lab Units 12/23/22  0522 12/22/22  0458 12/21/22  0506   WBC 10*3/mm3 12.62* 12.69* 13.62*   HEMOGLOBIN g/dL 11.5* 11.7* 11.3*   HEMATOCRIT % 33.6* 32.9* 33.8*   PLATELETS 10*3/mm3 180 176 143         Medication Review:   Meds reviewed  Scheduled Meds:apixaban, 5 mg, Oral, Q12H  aspirin, 81 mg, Oral, Daily  budesonide-formoterol, 2 puff, Inhalation, BID - RT  carbidopa-levodopa, 2 tablet, Oral, 4x Daily  cefTRIAXone, 2 g, Intravenous, Q24H  fluticasone, 2 spray, Nasal, Daily  furosemide, 40 mg, Oral, Daily  gabapentin, 400 mg, Oral, Daily  metoprolol tartrate, 25 mg, Oral, Q12H  pantoprazole, 40 mg, Oral, Daily  PARoxetine, 20 mg, Oral, QAM  sodium chloride, 10 mL, Intravenous, Q12H  sucralfate, 1 g, Oral, 4x Daily      I personally viewed and interpreted the patient's EKG/Telemetry data    Assessment and Plan  1.  Respiratory failure with pneumonia  2.  Atrial fibrillation, unknown duration history of atrial fibrillation 2019.    Rates are controlled on metoprolol and she is on Eliquis  3.  Chest with elevated troponin consistent with demand related non-STEMI.  Suspect this is more due to tachycardia versus true acute coronary syndrome.    Okay to stop aspirin at this time with EF normal and his risk of bleeding will be increased with concomitant use of Eliquis  3.  Congestive heart failure likely due to tachycardia.    Clinically stable on oral diuretics and with rate control  4.  Hypomagnesemia with hypokalemia, repleted  5.  Moderate aortic valve stenosis.  Continue routine outpatient  6.  Hypertension with diastolic dysfunction and no heart failure  7.  GERD  8.  Parkinson's disease  9.  Rheumatoid arthritis    CV status appears to be stable.  We will sign off for now and arrange follow-up in office in approximately 2 weeks    Mini Marcus  12/24/202218:12 EST  25min spent in reviewing records, discussion and  examination of the patient and discussion with other members of the patient's medical team.     Dictated utilizing Dragon dictation

## 2022-12-24 NOTE — PLAN OF CARE
Goal Outcome Evaluation:  Plan of Care Reviewed With: patient         VSS.  No C/O pain . Urine culture pending. Potassium replaced yesterday  - Improved  from 3.2 yesterday to 4.0 last night. Safety maintained. Will continue to monitor.  .

## 2022-12-24 NOTE — PROGRESS NOTES
Name: Trang Liu ADMIT: 2022   : 1935  PCP: Nayla Higginbotham APRN    MRN: [id number] LOS: 4 days   AGE/SEX: 87 y.o. female  ROOM: [facility]     Subjective   Subjective   Feels very tired this morning.  She has no other specific complaints.  A bit disappointed she will have to be here for [place].     Objective   Objective   Vital Signs  Temp:  [98.8 °F (37.1 °C)-102 °F (38.9 °C)] 98.8 °F (37.1 °C)  Heart Rate:  [] 83  Resp:  [16-17] 16  BP: (118-142)/(66-91) 132/91  SpO2:  [90 %-98 %] 94 %  on   ;   Device (Oxygen Therapy): room air  Body mass index is 23.57 kg/m².  Physical Exam  Constitutional:       General: She is not in acute distress.     Appearance: Normal appearance.   Cardiovascular:      Rate and Rhythm: Regular rhythm. Tachycardia present.      Heart sounds: No murmur heard.    No gallop.   Pulmonary:      Effort: Pulmonary effort is normal. No respiratory distress.      Breath sounds: Normal breath sounds. No wheezing.   Abdominal:      General: Abdomen is flat. There is no distension.      Palpations: Abdomen is soft.      Tenderness: There is no abdominal tenderness.   Musculoskeletal:      Right lower leg: Edema present.      Left lower leg: Edema present.   Skin:     General: Skin is warm.      Coloration: Skin is not jaundiced.      Comments: Scattered ecchymoses   Neurological:      General: No focal deficit present.      Mental Status: She is alert and oriented to person, place, and time.   Psychiatric:         Mood and Affect: Mood normal.         Behavior: Behavior normal.       Results Review     I reviewed the patient's new clinical results.  Results from last 7 days   Lab Units 22  05228 22  0506 22  1610   WBC 10*3/mm3 12.62* 12.69* 13.62* 13.40*   HEMOGLOBIN g/dL 11.5* 11.7* 11.3* 13.1   PLATELETS 10*3/mm3 180 176 143 138*     Results from last 7 days   Lab Units 22  1933 12/23/22  0522 22  0458 22  0507  12/20/22  1610   SODIUM mmol/L  --  134* 139 137 138   POTASSIUM mmol/L 4.0 3.2* 4.0 4.4 2.9*   CHLORIDE mmol/L  --  99 103 105 100   CO2 mmol/L  --  23.3 25.2 23.0 24.3   BUN mg/dL  --  14 13 15 14   CREATININE mg/dL  --  0.87 0.80 0.79 0.74   GLUCOSE mg/dL  --  104* 112* 116* 109*   EGFR mL/min/1.73  --  65.0 71.9 73.0 78.9     Results from last 7 days   Lab Units 12/21/22  0506 12/20/22  1610   ALBUMIN g/dL 3.10* 3.30*   BILIRUBIN mg/dL 0.6 0.9   ALK PHOS U/L 51 52   AST (SGOT) U/L 22 16   ALT (SGPT) U/L 6 17     Results from last 7 days   Lab Units 12/23/22  0522 12/22/22  0458 12/21/22  0506 12/20/22  1610   CALCIUM mg/dL 8.4* 8.6 8.9 9.5   ALBUMIN g/dL  --   --  3.10* 3.30*   MAGNESIUM mg/dL  --   --   --  1.6     Results from last 7 days   Lab Units 12/20/22  1820 12/20/22  1610   PROCALCITONIN ng/mL  --  0.67*   LACTATE mmol/L 2.0  --      No results found for: HGBA1C, POCGLU    No radiology results for the last day  Scheduled Medications  apixaban, 5 mg, Oral, Q12H  aspirin, 81 mg, Oral, Daily  budesonide-formoterol, 2 puff, Inhalation, BID - RT  carbidopa-levodopa, 2 tablet, Oral, 4x Daily  cefTRIAXone, 2 g, Intravenous, Q24H  fluticasone, 2 spray, Nasal, Daily  furosemide, 40 mg, Oral, Daily  gabapentin, 400 mg, Oral, Daily  metoprolol tartrate, 25 mg, Oral, Q12H  pantoprazole, 40 mg, Oral, Daily  PARoxetine, 20 mg, Oral, QAM  sodium chloride, 10 mL, Intravenous, Q12H  sucralfate, 1 g, Oral, 4x Daily    Infusions   Diet  Diet: Cardiac Diets; Healthy Heart (2-3 Na+); Texture: Regular Texture (IDDSI 7); Fluid Consistency: Thin (IDDSI 0)       Assessment/Plan     Active Hospital Problems    Diagnosis  POA   • **Pneumonia due to infectious organism, unspecified laterality, unspecified part of lung [J18.9]  Yes   • Aortic valve stenosis [I35.0]  Yes   • E coli bacteremia [R78.81, B96.20]  Yes   • Atrial fibrillation (HCC) [I48.91]  Yes   • Gastroesophageal reflux disease [K21.9]  Yes   • GERD (gastroesophageal  reflux disease) [K21.9]  Yes   • Hypertension [I10]  Yes   • Parkinsons (HCC) [G20]  Yes   • Rheumatoid arthritis (HCC) [M06.9]  Yes      Resolved Hospital Problems    Diagnosis Date Resolved POA   • Acute respiratory failure with hypoxia (HCC) [J96.01] 12/24/2022 Yes   • Sepsis (HCC) [A41.9] 12/24/2022 Yes   • Hypokalemia [E87.6] 12/22/2022 Yes       87 y.o. female admitted with Pneumonia due to infectious organism, unspecified laterality, unspecified part of lung.    E. coli bacteremia  Sepsis, resolved  -+ 12/20/2022; repeat 12/21/2022 negative  -ID following  -Complete ceftriaxone through 12/27/2022, per ID recommend IV given resistance profile  -Midline prior to DC if pre-cert obtained    Debility  -PT recommending SNF    A. fib with RVR  -RC: Metoprolol 25 mg twice daily  -AC: Was on therapeutic Lovenox, transition to apixaban 12/24/22    Acute diastolic heart failure  -Secondary to A. fib with RVR  -Cardiology following  -Continue metoprolol 25 mg twice daily, Lasix 40 mg daily    Parkinson's disease  -Sinemet 4 times daily    Acute hypoxic respiratory failure, resolved  -Multifactorial: AHRF, acute heart failure  -Now on room air    · Eliquis, started this admission for Afib full AC  · Full code.  · Discussed with nursing staff.  · Anticipate discharge to SNU facility when arrangements made      Ashish Bland MD  [facility]  12/24/22  10:51 EST

## 2022-12-25 PROBLEM — E83.42 HYPOMAGNESEMIA: Status: ACTIVE | Noted: 2022-12-25

## 2022-12-25 LAB
ANION GAP SERPL CALCULATED.3IONS-SCNC: 11.3 MMOL/L (ref 5–15)
BUN SERPL-MCNC: 14 MG/DL (ref 8–23)
BUN/CREAT SERPL: 18.4 (ref 7–25)
CALCIUM SPEC-SCNC: 8.5 MG/DL (ref 8.6–10.5)
CHLORIDE SERPL-SCNC: 100 MMOL/L (ref 98–107)
CO2 SERPL-SCNC: 23.7 MMOL/L (ref 22–29)
CREAT SERPL-MCNC: 0.76 MG/DL (ref 0.57–1)
DEPRECATED RDW RBC AUTO: 51 FL (ref 37–54)
EGFRCR SERPLBLD CKD-EPI 2021: 76.4 ML/MIN/1.73
ERYTHROCYTE [DISTWIDTH] IN BLOOD BY AUTOMATED COUNT: 13.7 % (ref 12.3–15.4)
GLUCOSE SERPL-MCNC: 115 MG/DL (ref 65–99)
HCT VFR BLD AUTO: 31.4 % (ref 34–46.6)
HGB BLD-MCNC: 10.5 G/DL (ref 12–15.9)
MAGNESIUM SERPL-MCNC: 1.3 MG/DL (ref 1.6–2.4)
MCH RBC QN AUTO: 34 PG (ref 26.6–33)
MCHC RBC AUTO-ENTMCNC: 33.4 G/DL (ref 31.5–35.7)
MCV RBC AUTO: 101.6 FL (ref 79–97)
PLATELET # BLD AUTO: 225 10*3/MM3 (ref 140–450)
PMV BLD AUTO: 11.8 FL (ref 6–12)
POTASSIUM SERPL-SCNC: 3.2 MMOL/L (ref 3.5–5.2)
POTASSIUM SERPL-SCNC: 3.7 MMOL/L (ref 3.5–5.2)
RBC # BLD AUTO: 3.09 10*6/MM3 (ref 3.77–5.28)
SODIUM SERPL-SCNC: 135 MMOL/L (ref 136–145)
WBC NRBC COR # BLD: 14.67 10*3/MM3 (ref 3.4–10.8)

## 2022-12-25 PROCEDURE — 25010000002 CEFTRIAXONE PER 250 MG: Performed by: HOSPITALIST

## 2022-12-25 PROCEDURE — 83735 ASSAY OF MAGNESIUM: CPT | Performed by: HOSPITALIST

## 2022-12-25 PROCEDURE — 0 MAGNESIUM SULFATE 4 GM/100ML SOLUTION: Performed by: HOSPITALIST

## 2022-12-25 PROCEDURE — 85027 COMPLETE CBC AUTOMATED: CPT | Performed by: STUDENT IN AN ORGANIZED HEALTH CARE EDUCATION/TRAINING PROGRAM

## 2022-12-25 PROCEDURE — 84132 ASSAY OF SERUM POTASSIUM: CPT | Performed by: HOSPITALIST

## 2022-12-25 PROCEDURE — 94799 UNLISTED PULMONARY SVC/PX: CPT

## 2022-12-25 PROCEDURE — 80048 BASIC METABOLIC PNL TOTAL CA: CPT | Performed by: STUDENT IN AN ORGANIZED HEALTH CARE EDUCATION/TRAINING PROGRAM

## 2022-12-25 RX ORDER — MAGNESIUM SULFATE HEPTAHYDRATE 40 MG/ML
4 INJECTION, SOLUTION INTRAVENOUS ONCE
Status: COMPLETED | OUTPATIENT
Start: 2022-12-25 | End: 2022-12-25

## 2022-12-25 RX ADMIN — FLUTICASONE PROPIONATE 2 SPRAY: 50 SPRAY, METERED NASAL at 08:59

## 2022-12-25 RX ADMIN — CARBIDOPA AND LEVODOPA 2 TABLET: 25; 100 TABLET ORAL at 20:27

## 2022-12-25 RX ADMIN — APIXABAN 5 MG: 5 TABLET, FILM COATED ORAL at 20:27

## 2022-12-25 RX ADMIN — BUDESONIDE AND FORMOTEROL FUMARATE DIHYDRATE 2 PUFF: 160; 4.5 AEROSOL RESPIRATORY (INHALATION) at 21:51

## 2022-12-25 RX ADMIN — METOPROLOL TARTRATE 25 MG: 25 TABLET, FILM COATED ORAL at 20:27

## 2022-12-25 RX ADMIN — HYDROCODONE BITARTRATE AND ACETAMINOPHEN 1 TABLET: 5; 325 TABLET ORAL at 02:21

## 2022-12-25 RX ADMIN — SUCRALFATE 1 G: 1 TABLET ORAL at 17:54

## 2022-12-25 RX ADMIN — METOPROLOL TARTRATE 25 MG: 25 TABLET, FILM COATED ORAL at 08:58

## 2022-12-25 RX ADMIN — SUCRALFATE 1 G: 1 TABLET ORAL at 08:58

## 2022-12-25 RX ADMIN — CARBIDOPA AND LEVODOPA 2 TABLET: 25; 100 TABLET ORAL at 17:54

## 2022-12-25 RX ADMIN — Medication 10 ML: at 09:01

## 2022-12-25 RX ADMIN — SUCRALFATE 1 G: 1 TABLET ORAL at 12:21

## 2022-12-25 RX ADMIN — Medication 10 ML: at 20:28

## 2022-12-25 RX ADMIN — APIXABAN 5 MG: 5 TABLET, FILM COATED ORAL at 08:58

## 2022-12-25 RX ADMIN — POTASSIUM CHLORIDE 40 MEQ: 750 TABLET, EXTENDED RELEASE ORAL at 10:25

## 2022-12-25 RX ADMIN — GABAPENTIN 400 MG: 400 CAPSULE ORAL at 08:58

## 2022-12-25 RX ADMIN — SUCRALFATE 1 G: 1 TABLET ORAL at 20:27

## 2022-12-25 RX ADMIN — PAROXETINE HYDROCHLORIDE HEMIHYDRATE 20 MG: 20 TABLET, FILM COATED ORAL at 06:28

## 2022-12-25 RX ADMIN — CARBIDOPA AND LEVODOPA 2 TABLET: 25; 100 TABLET ORAL at 08:59

## 2022-12-25 RX ADMIN — PANTOPRAZOLE SODIUM 40 MG: 40 TABLET, DELAYED RELEASE ORAL at 08:59

## 2022-12-25 RX ADMIN — CEFTRIAXONE 2 G: 2 INJECTION, POWDER, FOR SOLUTION INTRAMUSCULAR; INTRAVENOUS at 15:00

## 2022-12-25 RX ADMIN — CARBIDOPA AND LEVODOPA 2 TABLET: 25; 100 TABLET ORAL at 12:21

## 2022-12-25 RX ADMIN — FUROSEMIDE 40 MG: 40 TABLET ORAL at 08:58

## 2022-12-25 RX ADMIN — MAGNESIUM SULFATE HEPTAHYDRATE 4 G: 40 INJECTION, SOLUTION INTRAVENOUS at 16:20

## 2022-12-25 RX ADMIN — POTASSIUM CHLORIDE 40 MEQ: 750 TABLET, EXTENDED RELEASE ORAL at 17:58

## 2022-12-25 NOTE — PLAN OF CARE
Goal Outcome Evaluation:  Plan of Care Reviewed With: patient        Progress: improving  Outcome Evaluation: VSS.  No C/O pain . A-Fib.. Safety maintained. Will continue to monitor.

## 2022-12-25 NOTE — PROGRESS NOTES
Name: Trang Liu ADMIT: 2022   : 1935  PCP: Nayla Higginbotham APRN    MRN: [id number] LOS: 5 days   AGE/SEX: 87 y.o. female  ROOM: [facility]     Subjective   Subjective   Feeling okay today--just tired. No cough or SOA. No CP or palp. Voiding well. Tolerating diet and eating moderate amount. No F/C/NS.    Review of Systems   as above    Objective   Objective   Vital Signs  Temp:  [99.1 °F (37.3 °C)-100 °F (37.8 °C)] 99.1 °F (37.3 °C)  Heart Rate:  [84-93] 93  Resp:  [16-17] 17  BP: (118-121)/(66-86) 118/69  SpO2:  [93 %-99 %] 99 %  on   ;   Device (Oxygen Therapy): room air  Body mass index is 23.57 kg/m².  Physical Exam  Vitals and nursing note reviewed.   Constitutional:       General: She is not in acute distress.     Appearance: She is ill-appearing (chronically). She is not toxic-appearing or diaphoretic.   HENT:      Head: Normocephalic.      Nose: Nose normal.      Mouth/Throat:      Mouth: Mucous membranes are moist.      Pharynx: Oropharynx is clear.   Eyes:      General: No scleral icterus.        Right eye: No discharge.         Left eye: No discharge.      Extraocular Movements: Extraocular movements intact.      Conjunctiva/sclera: Conjunctivae normal.   Cardiovascular:      Rate and Rhythm: Normal rate. Rhythm irregular.      Pulses: Normal pulses.      Heart sounds: Murmur heard.   Pulmonary:      Effort: Pulmonary effort is normal. No respiratory distress.      Breath sounds: Normal breath sounds. No wheezing or rales.   Abdominal:      General: Bowel sounds are normal. There is no distension.      Palpations: Abdomen is soft.      Tenderness: There is no abdominal tenderness.   Musculoskeletal:         General: No swelling or deformity. Normal range of motion.      Cervical back: Neck supple. No rigidity.   Lymphadenopathy:      Cervical: No cervical adenopathy.   Skin:     General: Skin is warm and dry.      Capillary Refill: Capillary refill takes less than 2 seconds.       Coloration: Skin is not jaundiced.      Findings: No rash.   Neurological:      General: No focal deficit present.      Mental Status: She is alert and oriented to person, place, and time. Mental status is at baseline.      Cranial Nerves: No cranial nerve deficit.      Coordination: Coordination normal.   Psychiatric:         Mood and Affect: Mood normal.         Behavior: Behavior normal.      Comments: Very pleasant       Results Review     I reviewed the patient's new clinical results.  Results from last 7 days   Lab Units 12/25/22 0452 12/23/22 0522 12/22/22 0458 12/21/22  0506   WBC 10*3/mm3 14.67* 12.62* 12.69* 13.62*   HEMOGLOBIN g/dL 10.5* 11.5* 11.7* 11.3*   PLATELETS 10*3/mm3 225 180 176 143     Results from last 7 days   Lab Units 12/25/22 0452 12/23/22 1933 12/23/22 0522 12/22/22 0458 12/21/22  0506   SODIUM mmol/L 135*  --  134* 139 137   POTASSIUM mmol/L 3.2* 4.0 3.2* 4.0 4.4   CHLORIDE mmol/L 100  --  99 103 105   CO2 mmol/L 23.7  --  23.3 25.2 23.0   BUN mg/dL 14  --  14 13 15   CREATININE mg/dL 0.76  --  0.87 0.80 0.79   GLUCOSE mg/dL 115*  --  104* 112* 116*   EGFR mL/min/1.73 76.4  --  65.0 71.9 73.0     Results from last 7 days   Lab Units 12/21/22  0506 12/20/22  1610   ALBUMIN g/dL 3.10* 3.30*   BILIRUBIN mg/dL 0.6 0.9   ALK PHOS U/L 51 52   AST (SGOT) U/L 22 16   ALT (SGPT) U/L 6 17     Results from last 7 days   Lab Units 12/25/22 0452 12/23/22 0522 12/22/22 0458 12/21/22  0506 12/20/22  1610   CALCIUM mg/dL 8.5* 8.4* 8.6 8.9 9.5   ALBUMIN g/dL  --   --   --  3.10* 3.30*   MAGNESIUM mg/dL  --   --   --   --  1.6     Results from last 7 days   Lab Units 12/20/22  1820 12/20/22  1610   PROCALCITONIN ng/mL  --  0.67*   LACTATE mmol/L 2.0  --      No results found for: HGBA1C, POCGLU    No radiology results for the last day  Scheduled Medications  apixaban, 5 mg, Oral, Q12H  budesonide-formoterol, 2 puff, Inhalation, BID - RT  carbidopa-levodopa, 2 tablet, Oral, 4x  Daily  cefTRIAXone, 2 g, Intravenous, Q24H  fluticasone, 2 spray, Nasal, Daily  furosemide, 40 mg, Oral, Daily  gabapentin, 400 mg, Oral, Daily  metoprolol tartrate, 25 mg, Oral, Q12H  pantoprazole, 40 mg, Oral, Daily  PARoxetine, 20 mg, Oral, QAM  sodium chloride, 10 mL, Intravenous, Q12H  sucralfate, 1 g, Oral, 4x Daily    Infusions   Diet  Diet: Cardiac Diets; Healthy Heart (2-3 Na+); Texture: Regular Texture (IDDSI 7); Fluid Consistency: Thin (IDDSI 0)       Assessment/Plan     Active Hospital Problems    Diagnosis  POA   • **Pneumonia due to infectious organism, unspecified laterality, unspecified part of lung [J18.9]  Yes   • Aortic valve stenosis [I35.0]  Yes   • E coli bacteremia [R78.81, B96.20]  Yes   • Atrial fibrillation (HCC) [I48.91]  Yes   • Hypokalemia [E87.6]  Yes   • Gastroesophageal reflux disease [K21.9]  Yes   • GERD (gastroesophageal reflux disease) [K21.9]  Yes   • Hypertension [I10]  Yes   • Parkinsons (HCC) [G20]  Yes   • Rheumatoid arthritis (HCC) [M06.9]  Yes      Resolved Hospital Problems    Diagnosis Date Resolved POA   • Acute respiratory failure with hypoxia (HCC) [J96.01] 12/24/2022 Yes   • Sepsis (HCC) [A41.9] 12/24/2022 Yes       86yo woman with h/o AFib, Parkinson's, and HTN who presented to PCP's office with 2 weeks of weakness and productive cough. She was sent to ER with AFib with RVR and admitted with sepsis, PNA, and E.coli bacteremia.    PNA  E. coli bacteremia (?urinary source)  Sepsis, resolved  -Repeat blood cultures negative  -ID followed  -Ceftriaxone through 12/27 per ID, they recommend IV given resistance profile  -Midline prior to DC if pre-cert obtained before abx completed     Hypokalemia  -replace with protocol  -check Mg++ in AM     Debility  -PT recommending SNF, CCP working on it     A. fib with RVR  -RC: Metoprolol  -AC: Was on therapeutic Lovenox initially, transitioned to apixaban 12/24  -F/u with Card in 2 weeks as outpt     Acute diastolic heart  failure  -Secondary to A. fib with RVR  -Cardiology followed  -Continue metoprolol and Lasix    NSTEMI due to demand ischemia in setting of AFib with RVR  -no ACS, no need to continue ASA     Parkinson's disease  -Sinemet      Acute hypoxic respiratory failure, resolved  -Multifactorial: PNA, acute heart failure  -Now stable on room air     • Eliquis, started this admission for AFib, should suffice for DVT ppx.  • Full code.  • Discussed with pt.  • Anticipate discharge to SNU facility when arrangements made      Enzo Brush MD  [facility]  12/25/22  13:17 EST

## 2022-12-25 NOTE — PLAN OF CARE
Problem: Adult Inpatient Plan of Care  Goal: Plan of Care Review  Outcome: Ongoing, Progressing   Goal Outcome Evaluation:   Vss.K+ replaced per protocol, will replace mg per MD order.No c/o pain or n/v.

## 2022-12-26 ENCOUNTER — APPOINTMENT (OUTPATIENT)
Dept: GENERAL RADIOLOGY | Facility: HOSPITAL | Age: 87
DRG: 871 | End: 2022-12-26
Payer: MEDICARE

## 2022-12-26 LAB
ANION GAP SERPL CALCULATED.3IONS-SCNC: 12 MMOL/L (ref 5–15)
BACTERIA SPEC AEROBE CULT: NORMAL
BACTERIA SPEC AEROBE CULT: NORMAL
BUN SERPL-MCNC: 12 MG/DL (ref 8–23)
BUN/CREAT SERPL: 16 (ref 7–25)
CALCIUM SPEC-SCNC: 9.1 MG/DL (ref 8.6–10.5)
CHLORIDE SERPL-SCNC: 97 MMOL/L (ref 98–107)
CO2 SERPL-SCNC: 26 MMOL/L (ref 22–29)
CREAT SERPL-MCNC: 0.75 MG/DL (ref 0.57–1)
DEPRECATED RDW RBC AUTO: 49.2 FL (ref 37–54)
EGFRCR SERPLBLD CKD-EPI 2021: 77.6 ML/MIN/1.73
ERYTHROCYTE [DISTWIDTH] IN BLOOD BY AUTOMATED COUNT: 13.6 % (ref 12.3–15.4)
GLUCOSE SERPL-MCNC: 97 MG/DL (ref 65–99)
HCT VFR BLD AUTO: 33 % (ref 34–46.6)
HGB BLD-MCNC: 11.2 G/DL (ref 12–15.9)
MAGNESIUM SERPL-MCNC: 2.2 MG/DL (ref 1.6–2.4)
MCH RBC QN AUTO: 33.7 PG (ref 26.6–33)
MCHC RBC AUTO-ENTMCNC: 33.9 G/DL (ref 31.5–35.7)
MCV RBC AUTO: 99.4 FL (ref 79–97)
PLATELET # BLD AUTO: 261 10*3/MM3 (ref 140–450)
PMV BLD AUTO: 12 FL (ref 6–12)
POTASSIUM SERPL-SCNC: 4.4 MMOL/L (ref 3.5–5.2)
RBC # BLD AUTO: 3.32 10*6/MM3 (ref 3.77–5.28)
SODIUM SERPL-SCNC: 135 MMOL/L (ref 136–145)
WBC NRBC COR # BLD: 14.28 10*3/MM3 (ref 3.4–10.8)

## 2022-12-26 PROCEDURE — 25010000002 CEFTRIAXONE PER 250 MG: Performed by: HOSPITALIST

## 2022-12-26 PROCEDURE — 94664 DEMO&/EVAL PT USE INHALER: CPT

## 2022-12-26 PROCEDURE — 94799 UNLISTED PULMONARY SVC/PX: CPT

## 2022-12-26 PROCEDURE — 94761 N-INVAS EAR/PLS OXIMETRY MLT: CPT

## 2022-12-26 PROCEDURE — 71046 X-RAY EXAM CHEST 2 VIEWS: CPT

## 2022-12-26 PROCEDURE — 85027 COMPLETE CBC AUTOMATED: CPT | Performed by: STUDENT IN AN ORGANIZED HEALTH CARE EDUCATION/TRAINING PROGRAM

## 2022-12-26 PROCEDURE — 83735 ASSAY OF MAGNESIUM: CPT | Performed by: HOSPITALIST

## 2022-12-26 PROCEDURE — 80048 BASIC METABOLIC PNL TOTAL CA: CPT | Performed by: STUDENT IN AN ORGANIZED HEALTH CARE EDUCATION/TRAINING PROGRAM

## 2022-12-26 RX ADMIN — SUCRALFATE 1 G: 1 TABLET ORAL at 18:26

## 2022-12-26 RX ADMIN — APIXABAN 5 MG: 5 TABLET, FILM COATED ORAL at 21:04

## 2022-12-26 RX ADMIN — BUDESONIDE AND FORMOTEROL FUMARATE DIHYDRATE 2 PUFF: 160; 4.5 AEROSOL RESPIRATORY (INHALATION) at 19:42

## 2022-12-26 RX ADMIN — PANTOPRAZOLE SODIUM 40 MG: 40 TABLET, DELAYED RELEASE ORAL at 08:20

## 2022-12-26 RX ADMIN — APIXABAN 5 MG: 5 TABLET, FILM COATED ORAL at 08:20

## 2022-12-26 RX ADMIN — CARBIDOPA AND LEVODOPA 2 TABLET: 25; 100 TABLET ORAL at 18:26

## 2022-12-26 RX ADMIN — PAROXETINE HYDROCHLORIDE HEMIHYDRATE 20 MG: 20 TABLET, FILM COATED ORAL at 06:26

## 2022-12-26 RX ADMIN — METOPROLOL TARTRATE 25 MG: 25 TABLET, FILM COATED ORAL at 08:20

## 2022-12-26 RX ADMIN — Medication 10 ML: at 08:25

## 2022-12-26 RX ADMIN — METOPROLOL TARTRATE 25 MG: 25 TABLET, FILM COATED ORAL at 21:04

## 2022-12-26 RX ADMIN — CARBIDOPA AND LEVODOPA 2 TABLET: 25; 100 TABLET ORAL at 21:04

## 2022-12-26 RX ADMIN — CARBIDOPA AND LEVODOPA 2 TABLET: 25; 100 TABLET ORAL at 08:20

## 2022-12-26 RX ADMIN — SUCRALFATE 1 G: 1 TABLET ORAL at 21:04

## 2022-12-26 RX ADMIN — CEFTRIAXONE 2 G: 2 INJECTION, POWDER, FOR SOLUTION INTRAMUSCULAR; INTRAVENOUS at 16:24

## 2022-12-26 RX ADMIN — FUROSEMIDE 40 MG: 40 TABLET ORAL at 08:20

## 2022-12-26 RX ADMIN — Medication 10 ML: at 21:04

## 2022-12-26 RX ADMIN — SUCRALFATE 1 G: 1 TABLET ORAL at 13:14

## 2022-12-26 RX ADMIN — SUCRALFATE 1 G: 1 TABLET ORAL at 08:20

## 2022-12-26 RX ADMIN — CARBIDOPA AND LEVODOPA 2 TABLET: 25; 100 TABLET ORAL at 13:14

## 2022-12-26 RX ADMIN — FLUTICASONE PROPIONATE 2 SPRAY: 50 SPRAY, METERED NASAL at 08:21

## 2022-12-26 RX ADMIN — GABAPENTIN 400 MG: 400 CAPSULE ORAL at 08:20

## 2022-12-26 RX ADMIN — HYDROCODONE BITARTRATE AND ACETAMINOPHEN 1 TABLET: 5; 325 TABLET ORAL at 08:19

## 2022-12-26 NOTE — PLAN OF CARE
Goal Outcome Evaluation:  Plan of Care Reviewed With: patient        Progress: no change  Outcome Evaluation: NAD. All meds given as ordered. Slept throughout most of the shift. Will CTM.

## 2022-12-26 NOTE — PROGRESS NOTES
Name: Trang Liu ADMIT: 2022   : 1935  PCP: Nayla Higginbotham APRN    MRN: [id number] LOS: 6 days   AGE/SEX: 87 y.o. female  ROOM: [facility]     Subjective   Subjective   Feeling okay again today--just tired. Denies cough or SOA. No CP or palp. Voiding well. Tolerating diet and eating moderate amount. No F/C/NS.    Review of Systems     as above    Objective   Objective   Vital Signs  Temp:  [97.6 °F (36.4 °C)-100.9 °F (38.3 °C)] 97.6 °F (36.4 °C)  Heart Rate:  [] 81  Resp:  [16-18] 16  BP: (112-141)/(66-96) 141/96  SpO2:  [97 %-99 %] 97 %  on  Flow (L/min):  [2] 2;   Device (Oxygen Therapy): room air  Body mass index is 23.57 kg/m².     (No change in exam)    Physical Exam  Vitals and nursing note reviewed.   Constitutional:       General: She is not in acute distress.     Appearance: She is ill-appearing (chronically). She is not toxic-appearing or diaphoretic.   HENT:      Head: Normocephalic.      Nose: Nose normal.      Mouth/Throat:      Mouth: Mucous membranes are moist.      Pharynx: Oropharynx is clear.   Eyes:      General: No scleral icterus.        Right eye: No discharge.         Left eye: No discharge.      Extraocular Movements: Extraocular movements intact.      Conjunctiva/sclera: Conjunctivae normal.   Cardiovascular:      Rate and Rhythm: Normal rate. Rhythm irregular.      Pulses: Normal pulses.      Heart sounds: Murmur heard.   Pulmonary:      Effort: Pulmonary effort is normal. No respiratory distress.      Breath sounds: Normal breath sounds. No wheezing or rales.   Abdominal:      General: Bowel sounds are normal. There is no distension.      Palpations: Abdomen is soft.      Tenderness: There is no abdominal tenderness.   Musculoskeletal:         General: No swelling or deformity. Normal range of motion.      Cervical back: Neck supple. No rigidity.   Lymphadenopathy:      Cervical: No cervical adenopathy.   Skin:     General: Skin is warm and dry.       Capillary Refill: Capillary refill takes less than 2 seconds.      Coloration: Skin is not jaundiced.      Findings: No rash.   Neurological:      General: No focal deficit present.      Mental Status: She is alert and oriented to person, place, and time. Mental status is at baseline.      Cranial Nerves: No cranial nerve deficit.      Coordination: Coordination normal.   Psychiatric:         Mood and Affect: Mood normal.         Behavior: Behavior normal.      Comments: Very pleasant       Results Review     I reviewed the patient's new clinical results.  Results from last 7 days   Lab Units 12/26/22 0457 12/25/22 0452 12/23/22 0522 12/22/22 0458   WBC 10*3/mm3 14.28* 14.67* 12.62* 12.69*   HEMOGLOBIN g/dL 11.2* 10.5* 11.5* 11.7*   PLATELETS 10*3/mm3 261 225 180 176     Results from last 7 days   Lab Units 12/26/22  0457 12/25/22  2146 12/25/22 0452 12/23/22  1933 12/23/22 0522 12/22/22 0458   SODIUM mmol/L 135*  --  135*  --  134* 139   POTASSIUM mmol/L 4.4 3.7 3.2* 4.0 3.2* 4.0   CHLORIDE mmol/L 97*  --  100  --  99 103   CO2 mmol/L 26.0  --  23.7  --  23.3 25.2   BUN mg/dL 12  --  14  --  14 13   CREATININE mg/dL 0.75  --  0.76  --  0.87 0.80   GLUCOSE mg/dL 97  --  115*  --  104* 112*   EGFR mL/min/1.73 77.6  --  76.4  --  65.0 71.9     Results from last 7 days   Lab Units 12/21/22  0506 12/20/22  1610   ALBUMIN g/dL 3.10* 3.30*   BILIRUBIN mg/dL 0.6 0.9   ALK PHOS U/L 51 52   AST (SGOT) U/L 22 16   ALT (SGPT) U/L 6 17     Results from last 7 days   Lab Units 12/26/22  0457 12/25/22  0452 12/23/22  0522 12/22/22  0458 12/21/22  0506 12/20/22  1610   CALCIUM mg/dL 9.1 8.5* 8.4* 8.6 8.9 9.5   ALBUMIN g/dL  --   --   --   --  3.10* 3.30*   MAGNESIUM mg/dL 2.2 1.3*  --   --   --  1.6     Results from last 7 days   Lab Units 12/20/22  1820 12/20/22  1610   PROCALCITONIN ng/mL  --  0.67*   LACTATE mmol/L 2.0  --      No results found for: HGBA1C, POCGLU    No radiology results for the last day  Scheduled  Medications  apixaban, 5 mg, Oral, Q12H  budesonide-formoterol, 2 puff, Inhalation, BID - RT  carbidopa-levodopa, 2 tablet, Oral, 4x Daily  cefTRIAXone, 2 g, Intravenous, Q24H  fluticasone, 2 spray, Nasal, Daily  furosemide, 40 mg, Oral, Daily  gabapentin, 400 mg, Oral, Daily  metoprolol tartrate, 25 mg, Oral, Q12H  pantoprazole, 40 mg, Oral, Daily  PARoxetine, 20 mg, Oral, QAM  sodium chloride, 10 mL, Intravenous, Q12H  sucralfate, 1 g, Oral, 4x Daily    Infusions   Diet  Diet: Cardiac Diets; Healthy Heart (2-3 Na+); Texture: Regular Texture (IDDSI 7); Fluid Consistency: Thin (IDDSI 0)       Assessment/Plan     Active Hospital Problems    Diagnosis  POA   • **Pneumonia due to infectious organism, unspecified laterality, unspecified part of lung [J18.9]  Yes   • Hypomagnesemia [E83.42]  No   • Aortic valve stenosis [I35.0]  Yes   • E coli bacteremia [R78.81, B96.20]  Yes   • Atrial fibrillation (HCC) [I48.91]  Yes   • Hypokalemia [E87.6]  Yes   • Gastroesophageal reflux disease [K21.9]  Yes   • GERD (gastroesophageal reflux disease) [K21.9]  Yes   • Hypertension [I10]  Yes   • Parkinsons (HCC) [G20]  Yes   • Rheumatoid arthritis (HCC) [M06.9]  Yes      Resolved Hospital Problems    Diagnosis Date Resolved POA   • Acute respiratory failure with hypoxia (HCC) [J96.01] 12/24/2022 Yes   • Sepsis (HCC) [A41.9] 12/24/2022 Yes       88yo woman with h/o AFib, Parkinson's, and HTN who presented to PCP's office with 2 weeks of weakness and productive cough. She was sent to ER with AFib with RVR and admitted with sepsis, PNA, and E.coli bacteremia.    PNA  E. coli bacteremia (?urinary source)  Sepsis, resolved  -Repeat blood cultures negative so far  -ID followed  -Ceftriaxone through 12/27 per ID, they recommend IV given resistance profile  -Midline prior to DC if pre-cert obtained before abx completed   -T100.9 early this AM, WBC stable around 14  -Will ask ID to weigh in again and check a CXR--she had some mild hypoxia  overnight    Hypokalemia/Hypomagnesemia  -replacing K+ as needed with protocol  -Mg++ 2.2 after replacement     Debility  -PT recommending SNF, CCP working on it     A. fib with RVR  -RC: Metoprolol  -AC: Was on therapeutic Lovenox initially, transitioned to apixaban 12/24  -F/u with Card in 2 weeks as outpt  -Episode of tachycardia early this AM with fever, HRs fine now     Acute diastolic heart failure  -Secondary to A. fib with RVR  -Cardiology followed  -Continue metoprolol and Lasix    NSTEMI due to demand ischemia in setting of AFib with RVR  -no ACS, no need to continue ASA     Parkinson's disease  -Sinemet      Acute hypoxic respiratory failure, resolved  -Multifactorial: PNA, acute heart failure  -Now stable on room air     • Eliquis, started this admission for AFib, should suffice for DVT ppx.  • Full code.  • Discussed with pt and Pharmacist.  • Anticipate discharge to SNU facility when arrangements made      Enzo Brush MD  [facility]  12/26/22  13:26 EST

## 2022-12-27 ENCOUNTER — APPOINTMENT (OUTPATIENT)
Dept: CARDIOLOGY | Facility: HOSPITAL | Age: 87
DRG: 871 | End: 2022-12-27
Payer: MEDICARE

## 2022-12-27 LAB
ANION GAP SERPL CALCULATED.3IONS-SCNC: 10.2 MMOL/L (ref 5–15)
B PARAPERT DNA SPEC QL NAA+PROBE: NOT DETECTED
B PERT DNA SPEC QL NAA+PROBE: NOT DETECTED
BH CV LOWER VASCULAR LEFT COMMON FEMORAL AUGMENT: NORMAL
BH CV LOWER VASCULAR LEFT COMMON FEMORAL COMPETENT: NORMAL
BH CV LOWER VASCULAR LEFT COMMON FEMORAL COMPRESS: NORMAL
BH CV LOWER VASCULAR LEFT COMMON FEMORAL PHASIC: NORMAL
BH CV LOWER VASCULAR LEFT COMMON FEMORAL SPONT: NORMAL
BH CV LOWER VASCULAR LEFT DISTAL FEMORAL COMPRESS: NORMAL
BH CV LOWER VASCULAR LEFT GASTRONEMIUS COMPRESS: NORMAL
BH CV LOWER VASCULAR LEFT GREATER SAPH AK COMPRESS: NORMAL
BH CV LOWER VASCULAR LEFT GREATER SAPH BK COMPRESS: NORMAL
BH CV LOWER VASCULAR LEFT LESSER SAPH COMPRESS: NORMAL
BH CV LOWER VASCULAR LEFT MID FEMORAL AUGMENT: NORMAL
BH CV LOWER VASCULAR LEFT MID FEMORAL COMPETENT: NORMAL
BH CV LOWER VASCULAR LEFT MID FEMORAL COMPRESS: NORMAL
BH CV LOWER VASCULAR LEFT MID FEMORAL PHASIC: NORMAL
BH CV LOWER VASCULAR LEFT MID FEMORAL SPONT: NORMAL
BH CV LOWER VASCULAR LEFT PERONEAL COMPRESS: NORMAL
BH CV LOWER VASCULAR LEFT POPLITEAL AUGMENT: NORMAL
BH CV LOWER VASCULAR LEFT POPLITEAL COMPETENT: NORMAL
BH CV LOWER VASCULAR LEFT POPLITEAL COMPRESS: NORMAL
BH CV LOWER VASCULAR LEFT POPLITEAL PHASIC: NORMAL
BH CV LOWER VASCULAR LEFT POPLITEAL SPONT: NORMAL
BH CV LOWER VASCULAR LEFT POSTERIOR TIBIAL COMPRESS: NORMAL
BH CV LOWER VASCULAR LEFT PROFUNDA FEMORAL COMPRESS: NORMAL
BH CV LOWER VASCULAR LEFT PROXIMAL FEMORAL COMPRESS: NORMAL
BH CV LOWER VASCULAR LEFT SAPHENOFEMORAL JUNCTION COMPRESS: NORMAL
BH CV LOWER VASCULAR RIGHT COMMON FEMORAL AUGMENT: NORMAL
BH CV LOWER VASCULAR RIGHT COMMON FEMORAL COMPETENT: NORMAL
BH CV LOWER VASCULAR RIGHT COMMON FEMORAL COMPRESS: NORMAL
BH CV LOWER VASCULAR RIGHT COMMON FEMORAL PHASIC: NORMAL
BH CV LOWER VASCULAR RIGHT COMMON FEMORAL SPONT: NORMAL
BH CV LOWER VASCULAR RIGHT DISTAL FEMORAL COMPRESS: NORMAL
BH CV LOWER VASCULAR RIGHT GASTRONEMIUS COMPRESS: NORMAL
BH CV LOWER VASCULAR RIGHT GREATER SAPH AK COMPRESS: NORMAL
BH CV LOWER VASCULAR RIGHT GREATER SAPH BK COMPRESS: NORMAL
BH CV LOWER VASCULAR RIGHT LESSER SAPH COMPRESS: NORMAL
BH CV LOWER VASCULAR RIGHT MID FEMORAL AUGMENT: NORMAL
BH CV LOWER VASCULAR RIGHT MID FEMORAL COMPETENT: NORMAL
BH CV LOWER VASCULAR RIGHT MID FEMORAL COMPRESS: NORMAL
BH CV LOWER VASCULAR RIGHT MID FEMORAL PHASIC: NORMAL
BH CV LOWER VASCULAR RIGHT MID FEMORAL SPONT: NORMAL
BH CV LOWER VASCULAR RIGHT PERONEAL COMPRESS: NORMAL
BH CV LOWER VASCULAR RIGHT POPLITEAL AUGMENT: NORMAL
BH CV LOWER VASCULAR RIGHT POPLITEAL COMPETENT: NORMAL
BH CV LOWER VASCULAR RIGHT POPLITEAL COMPRESS: NORMAL
BH CV LOWER VASCULAR RIGHT POPLITEAL PHASIC: NORMAL
BH CV LOWER VASCULAR RIGHT POPLITEAL SPONT: NORMAL
BH CV LOWER VASCULAR RIGHT POSTERIOR TIBIAL COMPRESS: NORMAL
BH CV LOWER VASCULAR RIGHT PROFUNDA FEMORAL COMPRESS: NORMAL
BH CV LOWER VASCULAR RIGHT PROXIMAL FEMORAL COMPRESS: NORMAL
BH CV LOWER VASCULAR RIGHT SAPHENOFEMORAL JUNCTION COMPRESS: NORMAL
BILIRUB UR QL STRIP: NEGATIVE
BUN SERPL-MCNC: 14 MG/DL (ref 8–23)
BUN/CREAT SERPL: 23.7 (ref 7–25)
C PNEUM DNA NPH QL NAA+NON-PROBE: NOT DETECTED
CALCIUM SPEC-SCNC: 8.5 MG/DL (ref 8.6–10.5)
CHLORIDE SERPL-SCNC: 98 MMOL/L (ref 98–107)
CLARITY UR: CLEAR
CO2 SERPL-SCNC: 23.8 MMOL/L (ref 22–29)
COLOR UR: YELLOW
CREAT SERPL-MCNC: 0.59 MG/DL (ref 0.57–1)
DEPRECATED RDW RBC AUTO: 49.2 FL (ref 37–54)
EGFRCR SERPLBLD CKD-EPI 2021: 87.9 ML/MIN/1.73
ERYTHROCYTE [DISTWIDTH] IN BLOOD BY AUTOMATED COUNT: 13.2 % (ref 12.3–15.4)
FLUAV SUBTYP SPEC NAA+PROBE: NOT DETECTED
FLUBV RNA ISLT QL NAA+PROBE: NOT DETECTED
GLUCOSE SERPL-MCNC: 118 MG/DL (ref 65–99)
GLUCOSE UR STRIP-MCNC: NEGATIVE MG/DL
HADV DNA SPEC NAA+PROBE: NOT DETECTED
HCOV 229E RNA SPEC QL NAA+PROBE: NOT DETECTED
HCOV HKU1 RNA SPEC QL NAA+PROBE: NOT DETECTED
HCOV NL63 RNA SPEC QL NAA+PROBE: NOT DETECTED
HCOV OC43 RNA SPEC QL NAA+PROBE: NOT DETECTED
HCT VFR BLD AUTO: 29.1 % (ref 34–46.6)
HGB BLD-MCNC: 10.1 G/DL (ref 12–15.9)
HGB UR QL STRIP.AUTO: NEGATIVE
HMPV RNA NPH QL NAA+NON-PROBE: NOT DETECTED
HPIV1 RNA ISLT QL NAA+PROBE: NOT DETECTED
HPIV2 RNA SPEC QL NAA+PROBE: NOT DETECTED
HPIV3 RNA NPH QL NAA+PROBE: NOT DETECTED
HPIV4 P GENE NPH QL NAA+PROBE: NOT DETECTED
KETONES UR QL STRIP: NEGATIVE
LEUKOCYTE ESTERASE UR QL STRIP.AUTO: NEGATIVE
M PNEUMO IGG SER IA-ACNC: NOT DETECTED
MAGNESIUM SERPL-MCNC: 1.9 MG/DL (ref 1.6–2.4)
MAXIMAL PREDICTED HEART RATE: 134 BPM
MCH RBC QN AUTO: 34.8 PG (ref 26.6–33)
MCHC RBC AUTO-ENTMCNC: 34.7 G/DL (ref 31.5–35.7)
MCV RBC AUTO: 100.3 FL (ref 79–97)
NITRITE UR QL STRIP: NEGATIVE
PH UR STRIP.AUTO: 5.5 [PH] (ref 5–8)
PLATELET # BLD AUTO: 259 10*3/MM3 (ref 140–450)
PMV BLD AUTO: 11.7 FL (ref 6–12)
POTASSIUM SERPL-SCNC: 3.9 MMOL/L (ref 3.5–5.2)
PROCALCITONIN SERPL-MCNC: 0.14 NG/ML (ref 0–0.25)
PROT UR QL STRIP: NEGATIVE
RBC # BLD AUTO: 2.9 10*6/MM3 (ref 3.77–5.28)
RHINOVIRUS RNA SPEC NAA+PROBE: NOT DETECTED
RSV RNA NPH QL NAA+NON-PROBE: NOT DETECTED
SARS-COV-2 RNA NPH QL NAA+NON-PROBE: NOT DETECTED
SODIUM SERPL-SCNC: 132 MMOL/L (ref 136–145)
SP GR UR STRIP: 1.01 (ref 1–1.03)
STRESS TARGET HR: 114 BPM
UROBILINOGEN UR QL STRIP: NORMAL
WBC NRBC COR # BLD: 12.29 10*3/MM3 (ref 3.4–10.8)

## 2022-12-27 PROCEDURE — 94799 UNLISTED PULMONARY SVC/PX: CPT

## 2022-12-27 PROCEDURE — 25010000002 CEFTRIAXONE PER 250 MG: Performed by: HOSPITALIST

## 2022-12-27 PROCEDURE — 94664 DEMO&/EVAL PT USE INHALER: CPT

## 2022-12-27 PROCEDURE — 81003 URINALYSIS AUTO W/O SCOPE: CPT | Performed by: STUDENT IN AN ORGANIZED HEALTH CARE EDUCATION/TRAINING PROGRAM

## 2022-12-27 PROCEDURE — 87040 BLOOD CULTURE FOR BACTERIA: CPT | Performed by: STUDENT IN AN ORGANIZED HEALTH CARE EDUCATION/TRAINING PROGRAM

## 2022-12-27 PROCEDURE — 80048 BASIC METABOLIC PNL TOTAL CA: CPT | Performed by: STUDENT IN AN ORGANIZED HEALTH CARE EDUCATION/TRAINING PROGRAM

## 2022-12-27 PROCEDURE — 99233 SBSQ HOSP IP/OBS HIGH 50: CPT | Performed by: STUDENT IN AN ORGANIZED HEALTH CARE EDUCATION/TRAINING PROGRAM

## 2022-12-27 PROCEDURE — 83735 ASSAY OF MAGNESIUM: CPT | Performed by: HOSPITALIST

## 2022-12-27 PROCEDURE — 84145 PROCALCITONIN (PCT): CPT | Performed by: STUDENT IN AN ORGANIZED HEALTH CARE EDUCATION/TRAINING PROGRAM

## 2022-12-27 PROCEDURE — 97116 GAIT TRAINING THERAPY: CPT

## 2022-12-27 PROCEDURE — 85027 COMPLETE CBC AUTOMATED: CPT | Performed by: STUDENT IN AN ORGANIZED HEALTH CARE EDUCATION/TRAINING PROGRAM

## 2022-12-27 PROCEDURE — 0202U NFCT DS 22 TRGT SARS-COV-2: CPT | Performed by: STUDENT IN AN ORGANIZED HEALTH CARE EDUCATION/TRAINING PROGRAM

## 2022-12-27 PROCEDURE — 93970 EXTREMITY STUDY: CPT

## 2022-12-27 RX ADMIN — CARBIDOPA AND LEVODOPA 2 TABLET: 25; 100 TABLET ORAL at 11:47

## 2022-12-27 RX ADMIN — CARBIDOPA AND LEVODOPA 2 TABLET: 25; 100 TABLET ORAL at 08:31

## 2022-12-27 RX ADMIN — CARBIDOPA AND LEVODOPA 2 TABLET: 25; 100 TABLET ORAL at 20:27

## 2022-12-27 RX ADMIN — Medication 10 ML: at 08:32

## 2022-12-27 RX ADMIN — SUCRALFATE 1 G: 1 TABLET ORAL at 08:32

## 2022-12-27 RX ADMIN — FUROSEMIDE 40 MG: 40 TABLET ORAL at 08:32

## 2022-12-27 RX ADMIN — FLUTICASONE PROPIONATE 2 SPRAY: 50 SPRAY, METERED NASAL at 08:33

## 2022-12-27 RX ADMIN — CARBIDOPA AND LEVODOPA 2 TABLET: 25; 100 TABLET ORAL at 17:48

## 2022-12-27 RX ADMIN — SUCRALFATE 1 G: 1 TABLET ORAL at 11:47

## 2022-12-27 RX ADMIN — GABAPENTIN 400 MG: 400 CAPSULE ORAL at 08:32

## 2022-12-27 RX ADMIN — SUCRALFATE 1 G: 1 TABLET ORAL at 20:27

## 2022-12-27 RX ADMIN — Medication 10 ML: at 20:28

## 2022-12-27 RX ADMIN — CEFTRIAXONE 2 G: 2 INJECTION, POWDER, FOR SOLUTION INTRAMUSCULAR; INTRAVENOUS at 14:57

## 2022-12-27 RX ADMIN — APIXABAN 5 MG: 5 TABLET, FILM COATED ORAL at 20:28

## 2022-12-27 RX ADMIN — BUDESONIDE AND FORMOTEROL FUMARATE DIHYDRATE 2 PUFF: 160; 4.5 AEROSOL RESPIRATORY (INHALATION) at 19:37

## 2022-12-27 RX ADMIN — APIXABAN 5 MG: 5 TABLET, FILM COATED ORAL at 08:32

## 2022-12-27 RX ADMIN — METOPROLOL TARTRATE 25 MG: 25 TABLET, FILM COATED ORAL at 20:27

## 2022-12-27 RX ADMIN — METOPROLOL TARTRATE 25 MG: 25 TABLET, FILM COATED ORAL at 08:32

## 2022-12-27 RX ADMIN — SUCRALFATE 1 G: 1 TABLET ORAL at 17:48

## 2022-12-27 RX ADMIN — ACETAMINOPHEN 650 MG: 325 TABLET, FILM COATED ORAL at 20:27

## 2022-12-27 RX ADMIN — PANTOPRAZOLE SODIUM 40 MG: 40 TABLET, DELAYED RELEASE ORAL at 08:32

## 2022-12-27 RX ADMIN — PAROXETINE HYDROCHLORIDE HEMIHYDRATE 20 MG: 20 TABLET, FILM COATED ORAL at 06:43

## 2022-12-27 RX ADMIN — BUDESONIDE AND FORMOTEROL FUMARATE DIHYDRATE 2 PUFF: 160; 4.5 AEROSOL RESPIRATORY (INHALATION) at 11:06

## 2022-12-27 NOTE — DISCHARGE PLACEMENT REQUEST
Trang Liu (87 y.o. Female)     Date of Birth   1935    Social Security Number       Address   [address] [place] [address]    Home Phone   [phone]    MRN   [id number]       [employer] [place]    Marital Status                               Admission Date   12/20/22    Admission Type   Emergency    Admitting Provider   Josh Petersen MD    Attending Provider   Enzo Brush MD    Department, Room/Bed   [address], [address]1       Discharge Date       Discharge Disposition       Discharge Destination                               Attending Provider: Enzo Brush MD    Allergies: Cimetidine, Codeine, Doxycycline, Guaifenesin & Derivatives, Nitrofuran Derivatives, Oxaprozin, Penicillins, Sulfa Antibiotics    Isolation: None   Infection: COVID (rule out) (12/27/22)   Code Status: CPR    Ht: 167.6 cm (66\")   Wt: 66.2 kg (146 lb)    Admission Cmt: None   Principal Problem: Pneumonia due to infectious organism, unspecified laterality, unspecified part of lung [J18.9]                 Active Insurance as of 12/20/2022     Primary Coverage     Payor Plan Insurance Group Employer/Plan Group    ANTHEM MEDICARE REPLACEMENT ANTHEM MEDICARE ADVANTAGE [id number]     Payor Plan Address Payor Plan Phone Number Payor Plan Fax Number Effective Dates    [address] [phone]  1/1/2020 - None Entered    [facility] [address]       Subscriber Name Subscriber Birth Date Member ID       TRANG LIU 1935 [id number]                 Emergency Contacts      (Rel.) Home Phone Work Phone Mobile Phone    Bekah Doherty (Daughter) [phone] -- [phone]    KINGS ASHLEY (Daughter) [phone] -- --

## 2022-12-27 NOTE — PLAN OF CARE
Problem: Adult Inpatient Plan of Care  Goal: Plan of Care Review  Outcome: Ongoing, Progressing   Goal Outcome Evaluation:   Vss.Pt pleasantly confused.Had venous doppler today.Respiratory viral panel sent this am.Pt using IS at bedside with encouragement.C/ o pain this morning, refused medication, repositioned and tolerated well.No c/o n/v.

## 2022-12-27 NOTE — PROGRESS NOTES
LOS: 7 days     Chief Complaint: Fever    Interval History: Patient seen earlier in her admission in the setting of E. coli bacteremia and placed on 7 days of ceftriaxone due to resistance profile.  ID reconsulted for fever of 100.7.    Patient reports she is feeling better than when she did on admission today but reports overall her trajectory has been up and down.  States that she continues to have lower extremity pain bilaterally.  Denies any nausea, vomiting, diarrhea.  Denies any change in her chronic intermittent dysuria.  States she has a slightly dry scratchy throat.  Denies any cough or shortness of breath.  Off nasal cannula this morning.    Vital Signs  Temp:  [97 °F (36.1 °C)-99.4 °F (37.4 °C)] 99.1 °F (37.3 °C)  Heart Rate:  [] 98  Resp:  [17-18] 18  BP: (102-136)/(66-99) 102/75    Physical Exam:  General: In no acute distress  HEENT: Oropharynx clear, moist mucous membranes  Cardiovascular: RRR  Respiratory: Clear to ascultation bilaterally, no wheezing  GI: Soft, NT/ND, + bowel sounds bilaterally, no masses  Skin: Upper extremity ecchymoses.  Extremities: No cyanosis.  Tenderness of bilateral calfs.  Access: Peripheral IV.    Antibiotics:  Anti-Infectives (From admission, onward)    Ordered     Dose/Rate Route Frequency Start Stop    12/26/22 1200  cefTRIAXone (ROCEPHIN) 2 g in sodium chloride 0.9 % 100 mL IVPB-VTB        Ordering Provider: Enzo Brush MD    2 g  200 mL/hr over 30 Minutes Intravenous Every 24 Hours 12/26/22 1500 12/28/22 1459    12/21/22 0941  cefTRIAXone (ROCEPHIN) 2 g in sodium chloride 0.9 % 100 mL IVPB-VTB        Ordering Provider: Isreal Barron MD    2 g  200 mL/hr over 30 Minutes Intravenous Every 24 Hours 12/21/22 1600 12/25/22 1530    12/21/22 0941  levoFLOXacin (LEVAQUIN) 750 mg/150 mL D5W (premix) (LEVAQUIN) 750 mg        Ordering Provider: Isreal Barron MD    750 mg Intravenous Once 12/21/22 1030 12/21/22 1250    12/20/22 1745  cefTRIAXone  (ROCEPHIN) 1 g in sodium chloride 0.9 % 100 mL IVPB-VTB        Ordering Provider: Iglesia Hoffmann MD    1 g  200 mL/hr over 30 Minutes Intravenous Once 22           Results Review:     I reviewed the patient's new clinical results.  I reviewed the patient's new imaging results and agree with the interpretation.  I reviewed the patient's other test results and agree with the interpretation    Lab Results   Component Value Date    WBC 12.29 (H) 2022    HGB 10.1 (L) 2022    HCT 29.1 (L) 2022    .3 (H) 2022     2022     Lab Results   Component Value Date    GLUCOSE 118 (H) 2022    BUN 14 2022    CREATININE 0.59 2022    EGFRIFNONA 81 2020    BCR 23.7 2022    CO2 23.8 2022    CALCIUM 8.5 (L) 2022    ALBUMIN 3.10 (L) 2022    LABIL2 2.1 2021    AST 22 2022    ALT 6 2022       Microbiology:   respiratory panel negative   blood cultures positive 2 out of 2 E. Coli   blood cultures no growth   urine culture no growth   strep pneumoniae urine antigen negative   Legionella urine antigen negative   respiratory PCR in process    New imagin/26 chest x-ray reviewed by me with sizable right-sided hiatal hernia.  No pulmonary edema or consolidation identified.    Assessment    #Fever  #E. coli septicemia  #Pneumonia  #Large hiatal hernia  #History of ureteral stricture  #Parkinson's disease  #Rheumatoid arthritis    In the setting of continued fever will obtain lower extremity Doppler, UA, and repeat blood cultures.  Continue prior outlined course of ceftriaxone with last dose today.  Repeat chest imaging at acute evidence of pneumonia.  Repeat procalcitonin normalized.  Repeat respiratory panel today.    ID will follow.

## 2022-12-27 NOTE — PLAN OF CARE
Goal Outcome Evaluation:  Plan of Care Reviewed With: patient        Progress: improving  Outcome Evaluation: Pt tolerated treatement with c/o fatigue. Pt required L[place]na with bed mobility and ModA with sit<->stand transfers. Pt ambulated 20ft with rwx, MinAX2. Pt very fatigued and leaning onto walker. Verbal/safety cues given to pt not to lean on walker. Pt has a slow gait pace and a little unsteady. Will continue to progress pt as able.    ..Patient was not wearing a face mask during this therapy encounter. Therapist used appropriate personal protective equipment including eye protection, mask, and gloves.  Mask used was standard procedure mask. Appropriate PPE was worn during the entire therapy session. Hand hygiene was completed before and after therapy session. Patient is not in enhanced droplet precautions.

## 2022-12-27 NOTE — CASE MANAGEMENT/SOCIAL WORK
Continued Stay Note  [facility]     Patient Name: Trang Liu  MRN: [id number]  Today's Date: 12/27/2022    Admit Date: 12/20/2022    Plan: SNF. Referral to [facility] pending. Needs pre cert for SNF.   Discharge Plan     Row Name 12/27/22 1711       Plan    Plan SNF. Referral to [facility] pending. Needs pre cert for SNF.    Patient/Family in Agreement with Plan yes    Plan Comments Called and spoke with daughter, Jasmyn, regarding SNF. Daughter requested referrals to [facility] and [facility] which have no bed availability. Daughter requested referrals to [facility] Referrals placed and pending. Will follow up in AM. Will need precert for SNF. Saroj Jones RN-BC               Discharge Codes    No documentation.               Expected Discharge Date and Time     Expected Discharge Date Expected Discharge Time    Dec 28, 2022             Saroj Jones RN

## 2022-12-27 NOTE — PLAN OF CARE
Goal Outcome Evaluation:  Plan of Care Reviewed With: patient        Progress: no change  Outcome Evaluation: NAD. Slept throughout the shift. All meds given as ordered. Will CTM.

## 2022-12-27 NOTE — THERAPY TREATMENT NOTE
Patient Name: Trang Liu  : 1935    MRN: [id number]                              Today's Date: 2022       Admit Date: 2022    Visit Dx:     ICD-10-CM ICD-9-CM   1. Pneumonia due to infectious organism, unspecified laterality, unspecified part of lung  J18.9 486   2. Atrial fibrillation with RVR ([facility])  I48.91 427.31     Patient Active Problem List   Diagnosis   • Upper GI bleed   • Diastolic dysfunction   • GERD (gastroesophageal reflux disease)   • Hypertension   • Parkinsons ([facility])   • Rheumatoid arthritis ([facility])   • Anemia, posthemorrhagic, acute   • Gastroesophageal reflux disease   • Urinary tract infection, site not specified   • Rheumatoid arthritis (HCC)   • Hypokalemia   • Atrial fibrillation ([facility])   • Abdominal pain   • Anxiety   • Hypomagnesemia   • Pneumonia due to infectious organism, unspecified laterality, unspecified part of lung   • E coli bacteremia   • Aortic valve stenosis   • Hypomagnesemia     Past Medical History:   Diagnosis Date   • Anxiety    • Aortic valve insufficiency    • Ataxia    • Diastolic dysfunction    • GERD (gastroesophageal reflux disease)    • H/O hernia repair     umbilical   • History of hip replacement     left   • Hypertension    • Hypokalemia 2019   • Insomnia    • Mitral regurgitation    • Parkinsons (HCC)    • Rheumatoid arthritis ([facility])    • Rotator cuff disorder     left side, also old fracture, doesn';t use this arm due to pain   • Spastic colon    • Tremor    • Tricuspid valve regurgitation    • UTI (urinary tract infection)     frequent   • Wears glasses      Past Surgical History:   Procedure Laterality Date   • APPENDECTOMY     • CATARACT EXTRACTION     • ENDOSCOPY N/A 2018    LA Grade B reflux esophagitis, HH    • HERNIA REPAIR     • HYSTERECTOMY        General Information     Row Name 22 1410          Physical Therapy Time and Intention    Document Type therapy note (daily note)  -EB     Mode of Treatment individual  therapy;physical therapy  -EB     Row Name 12/27/22 1410          General Information    Existing Precautions/Restrictions fall  -EB     Row Name 12/27/22 1410          Cognition    Orientation Status (Cognition) oriented x 3  -EB     Row Name 12/27/22 1410          Safety Issues, Functional Mobility    Safety Issues Affecting Function (Mobility) safety precaution awareness;awareness of need for assistance  -EB     Impairments Affecting Function (Mobility) balance;pain;strength;endurance/activity tolerance  -EB           User Key  (r) = Recorded By, (t) = Taken By, (c) = Cosigned By    Initials Name Provider Type    Kristin Bruno PTA Physical Therapist Assistant               Mobility     Row Name 12/27/22 1410          Bed Mobility    Supine-Sit [place] (Bed Mobility) minimum assist (75% patient effort);verbal cues  -EB     Sit-Supine [place] (Bed Mobility) minimum assist (75% patient effort);verbal cues  -EB     Assistive Device (Bed Mobility) bed rails;head of bed elevated  -EB     Row Name 12/27/22 1410          Sit-Stand Transfer    Sit-Stand [place] (Transfers) moderate assist (50% patient effort)  -EB     Assistive Device (Sit-Stand Transfers) walker, front-wheeled  -EB     Row Name 12/27/22 1410          Gait/Stairs (Locomotion)    [place] Level (Gait) minimum assist (75% patient effort);2 person assist  -EB     Assistive Device (Gait) walker, front-wheeled  -     Distance in Feet (Gait) 20ft  -EB     Deviations/Abnormal Patterns (Gait) marija decreased;gait speed decreased;stride length decreased  -EB     Bilateral Gait Deviations forward flexed posture  -EB     Comment, (Gait/Stairs) very forward flexed posture. Pt leaning on walker despite safety cues. Pt very fatigued and demos decreased activity tolerance.  -EB           User Key  (r) = Recorded By, (t) = Taken By, (c) = Cosigned By    Initials Name Provider Type    Kristin Bruno PTA Physical Therapist Assistant                Obj/Interventions    No documentation.                Goals/Plan    No documentation.                Clinical Impression     Row Name 12/27/22 1413          Plan of Care Review    Plan of Care Reviewed With patient  -EB     Progress improving  -EB     Outcome Evaluation Pt tolerated treatement with c/o fatigue. Pt required Lorena with bed mobility and ModA with sit<->stand transfers. Pt ambulated 20ft with rwx, MinAX2. Pt very fatigued and leaning onto walker. Verbal/safety cues given to pt not to lean on walker. Pt has a slow gait pace and a little unsteady. Will continue to progress pt as able.  -EB     Row Name 12/27/22 1413          Therapy Assessment/Plan (PT)    Therapy Frequency (PT) 6 times/wk  -EB     Row Name 12/27/22 1413          Positioning and Restraints    Pre-Treatment Position in bed  -EB     Post Treatment Position bed  -EB     In Bed supine;call light within reach;encouraged to call for assist;exit alarm on  -EB           User Key  (r) = Recorded By, (t) = Taken By, (c) = Cosigned By    Initials Name Provider Type    Kristin Bruno PTA Physical Therapist Assistant               Outcome Measures     Row Name 12/27/22 1415          How much help from another person do you currently need...    Turning from your back to your side while in flat bed without using bedrails? 3  -EB     Moving from lying on back to sitting on the side of a flat bed without bedrails? 3  -EB     Moving to and from a bed to a chair (including a wheelchair)? 3  -EB     Standing up from a chair using your arms (e.g., wheelchair, bedside chair)? 2  -EB     Climbing 3-5 steps with a railing? 1  -EB     To walk in hospital room? 2  -EB     AM-PAC 6 Clicks Score (PT) 14  -EB     Highest level of mobility 4 --> Transferred to chair/commode  -EB           User Key  (r) = Recorded By, (t) = Taken By, (c) = Cosigned By    Initials Name Provider Type    Kristin Bruno PTA Physical Therapist Assistant                              Physical Therapy Education     Title: PT OT SLP Therapies (Done)     Topic: Physical Therapy (Done)     Point: Mobility training (Done)     Learning Progress Summary           Patient Acceptance, E, VU by  at 12/27/2022 1416    Acceptance, E,TB,D, VU,NR by  at 12/23/2022 1610    Acceptance, E,TB,D, VU,NR by  at 12/22/2022 1645    Acceptance, E,TB, VU by XO at 12/20/2022 2226                   Point: Home exercise program (Done)     Learning Progress Summary           Patient Acceptance, E,TB,D, VU,NR by  at 12/23/2022 1610    Acceptance, E,TB,D, VU,NR by  at 12/22/2022 1645    Acceptance, E,TB, VU by XO at 12/20/2022 2226                   Point: Body mechanics (Done)     Learning Progress Summary           Patient Acceptance, E, VU by  at 12/27/2022 1416    Acceptance, E,TB,D, VU,NR by  at 12/23/2022 1610    Acceptance, E,TB,D, VU,NR by  at 12/22/2022 1645    Acceptance, E,TB, VU by XO at 12/20/2022 2226                   Point: Precautions (Done)     Learning Progress Summary           Patient Acceptance, E, VU by  at 12/27/2022 1416    Acceptance, E,TB,D, VU,NR by  at 12/23/2022 1610    Acceptance, E,TB,D, VU,NR by  at 12/22/2022 1645    Acceptance, E,TB, VU by XO at 12/20/2022 2226                               User Key     Initials Effective Dates Name Provider Type Discipline     06/16/21 -  Yesica Kahn, PT Physical Therapist PT    EB 06/16/21 -  Kristin Mock PTA Physical Therapist Assistant PT    XO 09/22/22 -  Faye Lisa RN Registered Nurse Nurse              PT Recommendation and Plan     Plan of Care Reviewed With: patient  Progress: improving  Outcome Evaluation: Pt tolerated treatement with c/o fatigue. Pt required Lorena with bed mobility and ModA with sit<->stand transfers. Pt ambulated 20ft with rwx, MinAX2. Pt very fatigued and leaning onto walker. Verbal/safety cues given to pt not to lean on walker. Pt has a slow gait pace and a little unsteady. Will continue  to progress pt as able.     Time Calculation:    PT Charges     Row Name 12/27/22 1409             Time Calculation    Start Time 1316  -EB      Stop Time 1334  -EB      Time Calculation (min) 18 min  -EB      PT Received On 12/27/22  -EB      PT - Next Appointment 12/28/22  -EB         Time Calculation- PT    Total Timed Code Minutes- PT 18 minute(s)  -EB            User Key  (r) = Recorded By, (t) = Taken By, (c) = Cosigned By    Initials Name Provider Type    EB Kristin Mock PTA Physical Therapist Assistant              Therapy Charges for Today     Code Description Service Date Service Provider Modifiers Qty    86642190562 HC GAIT TRAINING EA 15 MIN 12/27/2022 Kristin Mock PTA GP 1    60854388167 HC PT THER SUPP EA 15 MIN 12/27/2022 Kristin Mock PTA GP 1          PT G-Codes  Outcome Measure Options: AM-PAC 6 Clicks Basic Mobility (PT)  AM-PAC 6 Clicks Score (PT): 14       Kristin Mock PTA  12/27/2022

## 2022-12-28 LAB
ANION GAP SERPL CALCULATED.3IONS-SCNC: 12.5 MMOL/L (ref 5–15)
BUN SERPL-MCNC: 11 MG/DL (ref 8–23)
BUN/CREAT SERPL: 18.3 (ref 7–25)
CALCIUM SPEC-SCNC: 8.8 MG/DL (ref 8.6–10.5)
CHLORIDE SERPL-SCNC: 98 MMOL/L (ref 98–107)
CO2 SERPL-SCNC: 26.5 MMOL/L (ref 22–29)
CREAT SERPL-MCNC: 0.6 MG/DL (ref 0.57–1)
DEPRECATED RDW RBC AUTO: 46.8 FL (ref 37–54)
EGFRCR SERPLBLD CKD-EPI 2021: 87.5 ML/MIN/1.73
ERYTHROCYTE [DISTWIDTH] IN BLOOD BY AUTOMATED COUNT: 13.1 % (ref 12.3–15.4)
GLUCOSE SERPL-MCNC: 99 MG/DL (ref 65–99)
HCT VFR BLD AUTO: 31.6 % (ref 34–46.6)
HGB BLD-MCNC: 10.9 G/DL (ref 12–15.9)
MAGNESIUM SERPL-MCNC: 1.8 MG/DL (ref 1.6–2.4)
MCH RBC QN AUTO: 33.4 PG (ref 26.6–33)
MCHC RBC AUTO-ENTMCNC: 34.5 G/DL (ref 31.5–35.7)
MCV RBC AUTO: 96.9 FL (ref 79–97)
PLATELET # BLD AUTO: 343 10*3/MM3 (ref 140–450)
PMV BLD AUTO: 11.4 FL (ref 6–12)
POTASSIUM SERPL-SCNC: 3.1 MMOL/L (ref 3.5–5.2)
POTASSIUM SERPL-SCNC: 4.3 MMOL/L (ref 3.5–5.2)
RBC # BLD AUTO: 3.26 10*6/MM3 (ref 3.77–5.28)
SODIUM SERPL-SCNC: 137 MMOL/L (ref 136–145)
WBC NRBC COR # BLD: 10.62 10*3/MM3 (ref 3.4–10.8)

## 2022-12-28 PROCEDURE — 85027 COMPLETE CBC AUTOMATED: CPT | Performed by: STUDENT IN AN ORGANIZED HEALTH CARE EDUCATION/TRAINING PROGRAM

## 2022-12-28 PROCEDURE — 80048 BASIC METABOLIC PNL TOTAL CA: CPT | Performed by: STUDENT IN AN ORGANIZED HEALTH CARE EDUCATION/TRAINING PROGRAM

## 2022-12-28 PROCEDURE — 97116 GAIT TRAINING THERAPY: CPT

## 2022-12-28 PROCEDURE — 84132 ASSAY OF SERUM POTASSIUM: CPT | Performed by: HOSPITALIST

## 2022-12-28 PROCEDURE — 94799 UNLISTED PULMONARY SVC/PX: CPT

## 2022-12-28 PROCEDURE — 25010000002 ONDANSETRON PER 1 MG: Performed by: NURSE PRACTITIONER

## 2022-12-28 PROCEDURE — 83735 ASSAY OF MAGNESIUM: CPT | Performed by: HOSPITALIST

## 2022-12-28 PROCEDURE — 99232 SBSQ HOSP IP/OBS MODERATE 35: CPT | Performed by: STUDENT IN AN ORGANIZED HEALTH CARE EDUCATION/TRAINING PROGRAM

## 2022-12-28 PROCEDURE — 97110 THERAPEUTIC EXERCISES: CPT

## 2022-12-28 RX ORDER — POTASSIUM CHLORIDE 750 MG/1
20 TABLET, FILM COATED, EXTENDED RELEASE ORAL DAILY
Status: DISCONTINUED | OUTPATIENT
Start: 2022-12-28 | End: 2022-12-29 | Stop reason: HOSPADM

## 2022-12-28 RX ADMIN — APIXABAN 5 MG: 5 TABLET, FILM COATED ORAL at 09:08

## 2022-12-28 RX ADMIN — SUCRALFATE 1 G: 1 TABLET ORAL at 12:44

## 2022-12-28 RX ADMIN — APIXABAN 5 MG: 5 TABLET, FILM COATED ORAL at 20:23

## 2022-12-28 RX ADMIN — ACETAMINOPHEN 650 MG: 325 TABLET, FILM COATED ORAL at 20:23

## 2022-12-28 RX ADMIN — POTASSIUM CHLORIDE 40 MEQ: 750 TABLET, EXTENDED RELEASE ORAL at 12:44

## 2022-12-28 RX ADMIN — CARBIDOPA AND LEVODOPA 2 TABLET: 25; 100 TABLET ORAL at 09:09

## 2022-12-28 RX ADMIN — DOCUSATE SODIUM 50MG AND SENNOSIDES 8.6MG 2 TABLET: 8.6; 5 TABLET, FILM COATED ORAL at 09:08

## 2022-12-28 RX ADMIN — Medication 10 ML: at 09:10

## 2022-12-28 RX ADMIN — POTASSIUM CHLORIDE 40 MEQ: 750 TABLET, EXTENDED RELEASE ORAL at 06:48

## 2022-12-28 RX ADMIN — PANTOPRAZOLE SODIUM 40 MG: 40 TABLET, DELAYED RELEASE ORAL at 09:09

## 2022-12-28 RX ADMIN — HYDROCODONE BITARTRATE AND ACETAMINOPHEN 1 TABLET: 5; 325 TABLET ORAL at 22:49

## 2022-12-28 RX ADMIN — METOPROLOL TARTRATE 25 MG: 25 TABLET, FILM COATED ORAL at 20:23

## 2022-12-28 RX ADMIN — ONDANSETRON 4 MG: 2 INJECTION INTRAMUSCULAR; INTRAVENOUS at 11:24

## 2022-12-28 RX ADMIN — METOPROLOL TARTRATE 25 MG: 25 TABLET, FILM COATED ORAL at 09:08

## 2022-12-28 RX ADMIN — SUCRALFATE 1 G: 1 TABLET ORAL at 09:09

## 2022-12-28 RX ADMIN — FUROSEMIDE 40 MG: 40 TABLET ORAL at 09:08

## 2022-12-28 RX ADMIN — Medication 10 ML: at 20:23

## 2022-12-28 RX ADMIN — BUDESONIDE AND FORMOTEROL FUMARATE DIHYDRATE 2 PUFF: 160; 4.5 AEROSOL RESPIRATORY (INHALATION) at 07:59

## 2022-12-28 RX ADMIN — GABAPENTIN 400 MG: 400 CAPSULE ORAL at 09:09

## 2022-12-28 RX ADMIN — POTASSIUM CHLORIDE 40 MEQ: 750 TABLET, EXTENDED RELEASE ORAL at 18:27

## 2022-12-28 RX ADMIN — PAROXETINE HYDROCHLORIDE HEMIHYDRATE 20 MG: 20 TABLET, FILM COATED ORAL at 05:43

## 2022-12-28 RX ADMIN — SUCRALFATE 1 G: 1 TABLET ORAL at 20:23

## 2022-12-28 RX ADMIN — SUCRALFATE 1 G: 1 TABLET ORAL at 18:27

## 2022-12-28 RX ADMIN — FLUTICASONE PROPIONATE 2 SPRAY: 50 SPRAY, METERED NASAL at 09:09

## 2022-12-28 RX ADMIN — ACETAMINOPHEN 650 MG: 325 TABLET, FILM COATED ORAL at 05:55

## 2022-12-28 RX ADMIN — CARBIDOPA AND LEVODOPA 2 TABLET: 25; 100 TABLET ORAL at 18:27

## 2022-12-28 RX ADMIN — CARBIDOPA AND LEVODOPA 2 TABLET: 25; 100 TABLET ORAL at 20:23

## 2022-12-28 RX ADMIN — CARBIDOPA AND LEVODOPA 2 TABLET: 25; 100 TABLET ORAL at 12:44

## 2022-12-28 NOTE — PLAN OF CARE
Goal Outcome Evaluation:  Plan of Care Reviewed With: patient        Progress: no change  Outcome Evaluation: Vitals remain stable with no issues. Patient complained of lower back pain - PRN norco given with relief. Purewick in place with good output. AO to self and time this shift - reorientation provided PRN. Q 2 turns completed and legs elevated. No other changes. Possible D/C today pending planning. JAMELM

## 2022-12-28 NOTE — PROGRESS NOTES
LOS: 8 days     Chief Complaint: Fever    Interval History: Patient doing well this morning.  Fever curve normalized.  Denies any fevers or chills.  Denies any nausea, vomiting, diarrhea.  Denies any dysuria.  Continues to complain of some lower extremity pain.  Complains of chronic back pain.  Leukocytosis normalized.    Vital Signs  Temp:  [98.3 °F (36.8 °C)-99 °F (37.2 °C)] 98.5 °F (36.9 °C)  Heart Rate:  [] 87  Resp:  [16-18] 16  BP: (119-139)/(62-78) 139/75    Physical Exam:  General: In no acute distress  HEENT: Oropharynx clear, moist mucous membranes  Cardiovascular: RRR  Respiratory: Clear to ascultation bilaterally, no wheezing  GI: Soft, NT/ND, + bowel sounds bilaterally, no masses  Skin: Upper extremity ecchymoses.  Extremities: No cyanosis.  Tenderness of bilateral calfs.  Access: Peripheral IV.    Antibiotics:  Anti-Infectives (From admission, onward)    Ordered     Dose/Rate Route Frequency Start Stop    12/26/22 1200  cefTRIAXone (ROCEPHIN) 2 g in sodium chloride 0.9 % 100 mL IVPB-VTB        Ordering Provider: Enzo Brush MD    2 g  200 mL/hr over 30 Minutes Intravenous Every 24 Hours 12/26/22 1500 12/27/22 1527    12/21/22 0941  cefTRIAXone (ROCEPHIN) 2 g in sodium chloride 0.9 % 100 mL IVPB-VTB        Ordering Provider: Isreal Barron MD    2 g  200 mL/hr over 30 Minutes Intravenous Every 24 Hours 12/21/22 1600 12/25/22 1530    12/21/22 0941  levoFLOXacin (LEVAQUIN) 750 mg/150 mL D5W (premix) (LEVAQUIN) 750 mg        Ordering Provider: Isreal Barron MD    750 mg Intravenous Once 12/21/22 1030 12/21/22 1250    12/20/22 1745  cefTRIAXone (ROCEPHIN) 1 g in sodium chloride 0.9 % 100 mL IVPB-VTB        Ordering Provider: Iglesia Hoffmann MD    1 g  200 mL/hr over 30 Minutes Intravenous Once 12/20/22 1747 12/20/22 1913           Results Review:     I reviewed the patient's new clinical results.  I reviewed the patient's new imaging results and agree with the interpretation.  I  reviewed the patient's other test results and agree with the interpretation    Lab Results   Component Value Date    WBC 10.62 2022    HGB 10.9 (L) 2022    HCT 31.6 (L) 2022    MCV 96.9 2022     2022     Lab Results   Component Value Date    GLUCOSE 99 2022    BUN 11 2022    CREATININE 0.60 2022    EGFRIFNONA 81 2020    BCR 18.3 2022    CO2 26.5 2022    CALCIUM 8.8 2022    ALBUMIN 3.10 (L) 2022    LABIL2 2.1 2021    AST 22 2022    ALT 6 2022       Microbiology:   respiratory panel negative   blood cultures positive 2 out of 2 E. Coli   blood cultures no growth   urine culture no growth   strep pneumoniae urine antigen negative   Legionella urine antigen negative   respiratory PCR negative   blood cultures in process    New imagin/27 lower extremity venous Doppler negative for DVT    Assessment    #Fever, improved  #E. coli septicemia status post 7 days of ceftriaxone  #Large hiatal hernia  #History of ureteral stricture  #Parkinson's disease  #Rheumatoid arthritis    Work-up yesterday with lower extremity Dopplers and UA were both within normal limits.  Repeat chest imaging has been reassuring and blood cultures are in process but repeats from the  have been no growth.    At this point she seems to be clinically improved and normalization of leukocytosis so no further recommendations for antibiotics at this time.  Okay for discharge from an infectious disease perspective once okay with other services.

## 2022-12-28 NOTE — THERAPY TREATMENT NOTE
Patient Name: Trang iLu  : 1935    MRN: [id number]                              Today's Date: 2022       Admit Date: 2022    Visit Dx:     ICD-10-CM ICD-9-CM   1. Pneumonia due to infectious organism, unspecified laterality, unspecified part of lung  J18.9 486   2. Atrial fibrillation with RVR ([facility])  I48.91 427.31     Patient Active Problem List   Diagnosis   • Upper GI bleed   • Diastolic dysfunction   • GERD (gastroesophageal reflux disease)   • Hypertension   • Parkinsons ([facility])   • Rheumatoid arthritis ([facility])   • Anemia, posthemorrhagic, acute   • Gastroesophageal reflux disease   • Urinary tract infection, site not specified   • Rheumatoid arthritis (HCC)   • Hypokalemia   • Atrial fibrillation ([facility])   • Abdominal pain   • Anxiety   • Hypomagnesemia   • Pneumonia due to infectious organism, unspecified laterality, unspecified part of lung   • E coli bacteremia   • Aortic valve stenosis   • Hypomagnesemia     Past Medical History:   Diagnosis Date   • Anxiety    • Aortic valve insufficiency    • Ataxia    • Diastolic dysfunction    • GERD (gastroesophageal reflux disease)    • H/O hernia repair     umbilical   • History of hip replacement     left   • Hypertension    • Hypokalemia 2019   • Insomnia    • Mitral regurgitation    • Parkinsons (HCC)    • Rheumatoid arthritis ([facility])    • Rotator cuff disorder     left side, also old fracture, doesn';t use this arm due to pain   • Spastic colon    • Tremor    • Tricuspid valve regurgitation    • UTI (urinary tract infection)     frequent   • Wears glasses      Past Surgical History:   Procedure Laterality Date   • APPENDECTOMY     • CATARACT EXTRACTION     • ENDOSCOPY N/A 2018    LA Grade B reflux esophagitis, HH    • HERNIA REPAIR     • HYSTERECTOMY        General Information     Row Name 22 1058          Physical Therapy Time and Intention    Document Type therapy note (daily note)  -EB     Mode of Treatment individual  therapy;physical therapy  -     Row Name 12/28/22 1058          General Information    Existing Precautions/Restrictions fall  -EB     Row Name 12/28/22 1058          Cognition    Orientation Status (Cognition) oriented x 3  -EB     Row Name 12/28/22 1058          Safety Issues, Functional Mobility    Safety Issues Affecting Function (Mobility) positioning of assistive device;safety precaution awareness  -     Impairments Affecting Function (Mobility) balance;endurance/activity tolerance;strength;pain  -EB           User Key  (r) = Recorded By, (t) = Taken By, (c) = Cosigned By    Initials Name Provider Type    Kristin Bruno PTA Physical Therapist Assistant               Mobility     Row Name 12/28/22 1059          Bed Mobility    Supine-Sit Ring[place] (Bed Mobility) minimum assist (75% patient effort);nonverbal cues (demo/gesture);verbal cues  -     Sit-Supine [place] (Bed Mobility) not tested  -     Assistive Device (Bed Mobility) bed rails;head of bed elevated  -     Row Name 12/28/22 1059          Sit-Stand Transfer    Sit-Stand [place] (Transfers) minimum assist (75% patient effort);verbal cues  -     Assistive Device (Sit-Stand Transfers) walker, front-wheeled  -     Row Name 12/28/22 1059          Gait/Stairs (Locomotion)    [place] Level (Gait) minimum assist (75% patient effort)  -     Assistive Device (Gait) walker, front-wheeled  -     Distance in Feet (Gait) 6ft  -EB     Deviations/Abnormal Patterns (Gait) marija decreased;gait speed decreased;stride length decreased  -EB     Bilateral Gait Deviations forward flexed posture;heel strike decreased  -EB     Comment, (Gait/Stairs) pt has difficulty putting weight through right UE due to pain and leans onto walker. assist with walker positioning. Pt demos decreased activity tolerance.  -EB           User Key  (r) = Recorded By, (t) = Taken By, (c) = Cosigned By    Initials Name Provider Type    Kristin Bruno PTA  Physical Therapist Assistant               Obj/Interventions     Row Name 12/28/22 1107          Motor Skills    Therapeutic Exercise --  BLE: AP, LAQs, seated marches (X10)  -EB     Row Name 12/28/22 1107          Balance    Balance Assessment sitting static balance  -EB     Static Sitting Balance standby assist  -EB     Position, Sitting Balance sitting edge of bed  -EB           User Key  (r) = Recorded By, (t) = Taken By, (c) = Cosigned By    Initials Name Provider Type    Kristin Bruno PTA Physical Therapist Assistant               Goals/Plan    No documentation.                Clinical Impression     Row Name 12/28/22 1108          Pain    Pre/Posttreatment Pain Comment RUE pain and back pain  -EB     Row Name 12/28/22 1108          Plan of Care Review    Plan of Care Reviewed With patient  -EB     Progress no change  -     Outcome Evaluation Pt tolerated treatment with c/o right UE and back pain. Pt is Lorena with bed mobility and with sit<->stand transfers. Pt able to ambulate 6ft today with walker, Lorena. Pt can hardly weightbear through RUE due to pain and leans onto walker. Pt needs assist with positioning of walker. Pt demos decreased activity tolerance. Pt completed bilateral LE exercises. Will continue to progress pt as able.  -EB     Row Name 12/28/22 1108          Therapy Assessment/Plan (PT)    Therapy Frequency (PT) 6 times/wk  -     Row Name 12/28/22 1108          Positioning and Restraints    Pre-Treatment Position in bed  -EB     Post Treatment Position chair  -EB     In Chair sitting;call light within reach;encouraged to call for assist;exit alarm on;with nsg;notified nsg  -EB           User Key  (r) = Recorded By, (t) = Taken By, (c) = Cosigned By    Initials Name Provider Type    Kristin Bruno PTA Physical Therapist Assistant               Outcome Measures     Row Name 12/28/22 1112          How much help from another person do you currently need...    Turning from your back to your  side while in flat bed without using bedrails? 3  -EB     Moving from lying on back to sitting on the side of a flat bed without bedrails? 3  -EB     Moving to and from a bed to a chair (including a wheelchair)? 3  -EB     Standing up from a chair using your arms (e.g., wheelchair, bedside chair)? 3  -EB     Climbing 3-5 steps with a railing? 1  -EB     To walk in hospital room? 2  -EB     AM-PAC 6 Clicks Score (PT) 15  -EB     Highest level of mobility 4 --> Transferred to chair/commode  -EB           User Key  (r) = Recorded By, (t) = Taken By, (c) = Cosigned By    Initials Name Provider Type    Kristin Bruno PTA Physical Therapist Assistant                             Physical Therapy Education     Title: PT OT SLP Therapies (Done)     Topic: Physical Therapy (Done)     Point: Mobility training (Done)     Learning Progress Summary           Patient Acceptance, E,D, VU,NR by  at 12/28/2022 1112    Acceptance, E, VU by  at 12/27/2022 1416    Acceptance, E,TB,D, VU,NR by  at 12/23/2022 1610    Acceptance, E,TB,D, VU,NR by  at 12/22/2022 1645    Acceptance, E,TB, VU by XO at 12/20/2022 2226                   Point: Home exercise program (Done)     Learning Progress Summary           Patient Acceptance, E,D, VU,NR by  at 12/28/2022 1112    Acceptance, E,TB,D, VU,NR by  at 12/23/2022 1610    Acceptance, E,TB,D, VU,NR by  at 12/22/2022 1645    Acceptance, E,TB, VU by XO at 12/20/2022 2226                   Point: Body mechanics (Done)     Learning Progress Summary           Patient Acceptance, E,D, VU,NR by  at 12/28/2022 1112    Acceptance, E, VU by  at 12/27/2022 1416    Acceptance, E,TB,D, VU,NR by  at 12/23/2022 1610    Acceptance, E,TB,D, VU,NR by  at 12/22/2022 1645    Acceptance, E,TB, VU by XO at 12/20/2022 2226                   Point: Precautions (Done)     Learning Progress Summary           Patient Acceptance, E,D, VU,NR by EB at 12/28/2022 1112    Acceptance, E, VU by EB at  12/27/2022 1416    Acceptance, E,TB,D, VU,NR by  at 12/23/2022 1610    Acceptance, E,TB,D, VU,NR by  at 12/22/2022 1645    Acceptance, E,TB, VU by XO at 12/20/2022 2226                               User Key     Initials Effective Dates Name Provider Type Discipline     06/16/21 -  Yesica Kahn, PT Physical Therapist PT    EB 06/16/21 -  Kristin Mock PTA Physical Therapist Assistant PT    XO 09/22/22 -  Faye Lisa RN Registered Nurse Nurse              PT Recommendation and Plan     Plan of Care Reviewed With: patient  Progress: no change  Outcome Evaluation: Pt tolerated treatment with c/o right UE and back pain. Pt is Lorena with bed mobility and with sit<->stand transfers. Pt able to ambulate 6ft today with walker, Lorena. Pt can hardly weightbear through RUE due to pain and leans onto walker. Pt needs assist with positioning of walker. Pt demos decreased activity tolerance. Pt completed bilateral LE exercises. Will continue to progress pt as able.     Time Calculation:    PT Charges     Row Name 12/28/22 1058             Time Calculation    Start Time 0825  -EB      Stop Time 0849  -EB      Time Calculation (min) 24 min  -EB      PT Received On 12/28/22  -      PT - Next Appointment 12/29/22  -         Time Calculation- PT    Total Timed Code Minutes- PT 24 minute(s)  -EB            User Key  (r) = Recorded By, (t) = Taken By, (c) = Cosigned By    Initials Name Provider Type     Kristin Mock PTA Physical Therapist Assistant              Therapy Charges for Today     Code Description Service Date Service Provider Modifiers Qty    38916211784 HC GAIT TRAINING EA 15 MIN 12/27/2022 Kristin Mock PTA GP 1    21627537896 HC PT THER SUPP EA 15 MIN 12/27/2022 Kristin Mock, BRIANNA GP 1    09888416573 HC GAIT TRAINING EA 15 MIN 12/28/2022 Kristin Mock PTA GP 1    38592846910 HC PT THER PROC EA 15 MIN 12/28/2022 Kristin Mock PTA GP 1          PT G-Codes  Outcome Measure Options: AM-PAC 6 Clicks  Basic Mobility (PT)  AM-PAC 6 Clicks Score (PT): 15       Kristin Mock, PTA  12/28/2022

## 2022-12-28 NOTE — PROGRESS NOTES
Name: Trang Liu ADMIT: 2022   : 1935  PCP: Nayla Higginbotham APRN    MRN: [id number] LOS: 8 days   AGE/SEX: 87 y.o. female  ROOM: [facility]     Subjective   Subjective   Feeling better today--still tired. Was able to sit up in chair for the morning. Denies cough or SOA. No CP or palp. Voiding well. Tolerating diet and eating moderate amount. Did have some mild N this AM after taking all her pills, but resolved without intervention. No F/C/NS.    Review of Systems     as above    Objective   Objective   Vital Signs  Temp:  [98.3 °F (36.8 °C)-99 °F (37.2 °C)] 98.5 °F (36.9 °C)  Heart Rate:  [] 87  Resp:  [16-18] 16  BP: (119-139)/(62-78) 139/75  SpO2:  [93 %-96 %] 95 %  on   ;   Device (Oxygen Therapy): room air  Body mass index is 23.57 kg/m².     (No change in exam)    Physical Exam  Vitals and nursing note reviewed. Exam conducted with a chaperone present (Dtr and son-in-law).   Constitutional:       General: She is not in acute distress.     Appearance: She is ill-appearing (chronically). She is not toxic-appearing or diaphoretic.   HENT:      Head: Normocephalic.      Nose: Nose normal.      Mouth/Throat:      Mouth: Mucous membranes are moist.      Pharynx: Oropharynx is clear.   Eyes:      General: No scleral icterus.        Right eye: No discharge.         Left eye: No discharge.      Extraocular Movements: Extraocular movements intact.      Conjunctiva/sclera: Conjunctivae normal.   Cardiovascular:      Rate and Rhythm: Normal rate. Rhythm irregular.      Pulses: Normal pulses.      Heart sounds: Murmur heard.   Pulmonary:      Effort: Pulmonary effort is normal. No respiratory distress.      Breath sounds: Normal breath sounds. No wheezing or rales.   Abdominal:      General: Bowel sounds are normal. There is no distension.      Palpations: Abdomen is soft.      Tenderness: There is no abdominal tenderness.   Musculoskeletal:         General: No swelling or deformity. Normal  range of motion.      Cervical back: Neck supple. No rigidity.   Lymphadenopathy:      Cervical: No cervical adenopathy.   Skin:     General: Skin is warm and dry.      Capillary Refill: Capillary refill takes less than 2 seconds.      Coloration: Skin is not jaundiced.      Findings: No rash.   Neurological:      General: No focal deficit present.      Mental Status: She is alert and oriented to person, place, and time. Mental status is at baseline.      Cranial Nerves: No cranial nerve deficit.      Coordination: Coordination normal.   Psychiatric:         Mood and Affect: Mood normal.         Behavior: Behavior normal.      Comments: Very pleasant       Results Review     I reviewed the patient's new clinical results.  Results from last 7 days   Lab Units 12/28/22  0530 12/27/22 0450 12/26/22 0457 12/25/22 0452   WBC 10*3/mm3 10.62 12.29* 14.28* 14.67*   HEMOGLOBIN g/dL 10.9* 10.1* 11.2* 10.5*   PLATELETS 10*3/mm3 343 259 261 225     Results from last 7 days   Lab Units 12/28/22  0530 12/27/22 0450 12/26/22 0457 12/25/22 2146 12/25/22 0452   SODIUM mmol/L 137 132* 135*  --  135*   POTASSIUM mmol/L 3.1* 3.9 4.4 3.7 3.2*   CHLORIDE mmol/L 98 98 97*  --  100   CO2 mmol/L 26.5 23.8 26.0  --  23.7   BUN mg/dL 11 14 12  --  14   CREATININE mg/dL 0.60 0.59 0.75  --  0.76   GLUCOSE mg/dL 99 118* 97  --  115*   EGFR mL/min/1.73 87.5 87.9 77.6  --  76.4         Results from last 7 days   Lab Units 12/28/22  0530 12/27/22 0450 12/26/22 0457 12/25/22 0452   CALCIUM mg/dL 8.8 8.5* 9.1 8.5*   MAGNESIUM mg/dL 1.8 1.9 2.2 1.3*     Results from last 7 days   Lab Units 12/27/22 0450   PROCALCITONIN ng/mL 0.14     No results found for: HGBA1C, POCGLU    No radiology results for the last day  Scheduled Medications  apixaban, 5 mg, Oral, Q12H  budesonide-formoterol, 2 puff, Inhalation, BID - RT  carbidopa-levodopa, 2 tablet, Oral, 4x Daily  fluticasone, 2 spray, Nasal, Daily  furosemide, 40 mg, Oral, Daily  gabapentin, 400  mg, Oral, Daily  metoprolol tartrate, 25 mg, Oral, Q12H  pantoprazole, 40 mg, Oral, Daily  PARoxetine, 20 mg, Oral, QAM  sodium chloride, 10 mL, Intravenous, Q12H  sucralfate, 1 g, Oral, 4x Daily    Infusions   Diet  Diet: Cardiac Diets; Healthy Heart (2-3 Na+); Texture: Regular Texture (IDDSI 7); Fluid Consistency: Thin (IDDSI 0)       Assessment/Plan     Active Hospital Problems    Diagnosis  POA   • **Pneumonia due to infectious organism, unspecified laterality, unspecified part of lung [J18.9]  Yes   • Hypomagnesemia [E83.42]  No   • Aortic valve stenosis [I35.0]  Yes   • E coli bacteremia [R78.81, B96.20]  Yes   • Atrial fibrillation (HCC) [I48.91]  Yes   • Hypokalemia [E87.6]  Yes   • Gastroesophageal reflux disease [K21.9]  Yes   • GERD (gastroesophageal reflux disease) [K21.9]  Yes   • Hypertension [I10]  Yes   • Parkinsons (HCC) [G20]  Yes   • Rheumatoid arthritis (HCC) [M06.9]  Yes      Resolved Hospital Problems    Diagnosis Date Resolved POA   • Acute respiratory failure with hypoxia (HCC) [J96.01] 12/24/2022 Yes   • Sepsis (HCC) [A41.9] 12/24/2022 Yes       88yo woman with h/o AFib, Parkinson's, and HTN who presented to PCP's office with 2 weeks of weakness and productive cough. She was sent to ER with AFib with RVR and admitted with sepsis, PNA, and E.coli bacteremia.    PNA  E. coli bacteremia (?urinary source)  Sepsis, resolved  -Repeat blood cultures negative x 5d  -ID following  -s/p course of Ceftriaxone per ID recs, they recommended IV given resistance profile  -T100.9 in AM 12/26 and WBC was up to 14, no fever since and WBC down to 10 this AM  -Asked ID to weigh in again, rechecked a CXR and it was fine, RVP negative, PCT neg, venous duplex BLE and UA both unremarkable, repeat blood cultures sent yesterday AM are NGTD  -ID does not feel further abx therapy indicated and is okay with pt's dc    Hypokalemia/Hypomagnesemia  -replacing K+ as needed with protocol  -add daily KCl today (was taking at  home)  -Mg++ wnl after replacement     Debility  -PT recommending SNF, CCP working on it     A. fib with RVR  -RC: Metoprolol  -AC: Was on therapeutic Lovenox initially, transitioned to apixaban 12/24  -F/u with Card in 2 weeks as outpt  -Episode of tachycardia 12/26 AM with fever, HRs fine now     Acute diastolic heart failure  -Secondary to A. fib with RVR, resolved  -Cardiology followed  -Continue metoprolol and Lasix    NSTEMI due to demand ischemia in setting of AFib with RVR  -no ACS, no need to continue ASA     Parkinson's disease  -Sinemet      Acute hypoxic respiratory failure, resolved  -Multifactorial: PNA, acute heart failure  -Stable on room air       • Eliquis, started this admission for AFib, should suffice for DVT ppx.  • Full code.  • Discussed with pt, dtr, and son-in-law.  • Anticipate discharge to SNU facility when arrangements made      Enzo Brush MD  [facility]  12/28/22  13:06 EST

## 2022-12-28 NOTE — PLAN OF CARE
Goal Outcome Evaluation:  Plan of Care Reviewed With: patient        Progress: no change  Outcome Evaluation: Pt tolerated treatment with c/o right UE and back pain. Pt is Lorena with bed mobility and with sit<->stand transfers. Pt able to ambulate 6ft today with walker, Lorena. Pt can hardly weightbear through RUE due to pain and leans onto walker. Pt needs assist with positioning of walker. Pt demos decreased activity tolerance. Pt completed bilateral LE exercises. Will continue to progress pt as able.    ..Patient was not wearing a face mask during this therapy encounter. Therapist used appropriate personal protective equipment including eye protection, mask, and gloves.  Mask used was standard procedure mask. Appropriate PPE was worn during the entire therapy session. Hand hygiene was completed before and after therapy session. Patient is not in enhanced droplet precautions.

## 2022-12-28 NOTE — CASE MANAGEMENT/SOCIAL WORK
Continued Stay Note  [facility]     Patient Name: Trang Liu  MRN: [id number]  Today's Date: 12/28/2022    Admit Date: 12/20/2022    Plan: [facility]. Pre cert started. Needs WC Van transport.   Discharge Plan     Row Name 12/28/22 1332       Plan    Plan [facility]. Pre cert started. Needs WC Van transport.    Patient/Family in Agreement with Plan yes    Plan Comments Followed up with Sis/Sig regarding [facility] and [facility]. Per Sis/Sig, [facility] has private room and can accept pt once pre cert obtained. Called and notified Jasmyn, daughter, and she is in agreement. Pt will need WC van transport. Saroj Jones RN-BC               Discharge Codes    No documentation.               Expected Discharge Date and Time     Expected Discharge Date Expected Discharge Time    Dec 28, 2022             Saroj Jones RN

## 2022-12-28 NOTE — PLAN OF CARE
Problem: Adult Inpatient Plan of Care  Goal: Plan of Care Review  Outcome: Ongoing, Progressing  Flowsheets (Taken 12/28/2022 1808)  Progress: improving  Plan of Care Reviewed With: patient  Outcome Evaluation: Pt vitals stable. Prn meds for nausea x1. Up to chair today. Potassium replaced. Q 2 turn. Possible dc tomorrow. Pt safety maintained

## 2022-12-29 VITALS
SYSTOLIC BLOOD PRESSURE: 133 MMHG | HEIGHT: 66 IN | OXYGEN SATURATION: 96 % | BODY MASS INDEX: 23.46 KG/M2 | HEART RATE: 89 BPM | WEIGHT: 146 LBS | TEMPERATURE: 98.8 F | DIASTOLIC BLOOD PRESSURE: 85 MMHG | RESPIRATION RATE: 18 BRPM

## 2022-12-29 PROBLEM — G89.4 CHRONIC PAIN SYNDROME: Chronic | Status: ACTIVE | Noted: 2022-12-29

## 2022-12-29 PROBLEM — I50.31 DIASTOLIC CHF, ACUTE (HCC): Status: ACTIVE | Noted: 2022-12-29

## 2022-12-29 PROBLEM — F11.20 OPIOID DEPENDENCE (HCC): Chronic | Status: ACTIVE | Noted: 2022-12-29

## 2022-12-29 LAB
ANION GAP SERPL CALCULATED.3IONS-SCNC: 8 MMOL/L (ref 5–15)
BUN SERPL-MCNC: 13 MG/DL (ref 8–23)
BUN/CREAT SERPL: 18.8 (ref 7–25)
CALCIUM SPEC-SCNC: 8.8 MG/DL (ref 8.6–10.5)
CHLORIDE SERPL-SCNC: 102 MMOL/L (ref 98–107)
CO2 SERPL-SCNC: 27 MMOL/L (ref 22–29)
CREAT SERPL-MCNC: 0.69 MG/DL (ref 0.57–1)
DEPRECATED RDW RBC AUTO: 49 FL (ref 37–54)
EGFRCR SERPLBLD CKD-EPI 2021: 84.6 ML/MIN/1.73
ERYTHROCYTE [DISTWIDTH] IN BLOOD BY AUTOMATED COUNT: 13.4 % (ref 12.3–15.4)
GLUCOSE SERPL-MCNC: 86 MG/DL (ref 65–99)
HCT VFR BLD AUTO: 31.6 % (ref 34–46.6)
HGB BLD-MCNC: 10.5 G/DL (ref 12–15.9)
MAGNESIUM SERPL-MCNC: 1.8 MG/DL (ref 1.6–2.4)
MCH RBC QN AUTO: 33.5 PG (ref 26.6–33)
MCHC RBC AUTO-ENTMCNC: 33.2 G/DL (ref 31.5–35.7)
MCV RBC AUTO: 101 FL (ref 79–97)
PLATELET # BLD AUTO: 337 10*3/MM3 (ref 140–450)
PMV BLD AUTO: 11.2 FL (ref 6–12)
POTASSIUM SERPL-SCNC: 4.7 MMOL/L (ref 3.5–5.2)
RBC # BLD AUTO: 3.13 10*6/MM3 (ref 3.77–5.28)
SODIUM SERPL-SCNC: 137 MMOL/L (ref 136–145)
WBC NRBC COR # BLD: 10.13 10*3/MM3 (ref 3.4–10.8)

## 2022-12-29 PROCEDURE — 80048 BASIC METABOLIC PNL TOTAL CA: CPT | Performed by: STUDENT IN AN ORGANIZED HEALTH CARE EDUCATION/TRAINING PROGRAM

## 2022-12-29 PROCEDURE — 25010000002 ONDANSETRON PER 1 MG: Performed by: NURSE PRACTITIONER

## 2022-12-29 PROCEDURE — 94799 UNLISTED PULMONARY SVC/PX: CPT

## 2022-12-29 PROCEDURE — 94761 N-INVAS EAR/PLS OXIMETRY MLT: CPT

## 2022-12-29 PROCEDURE — 99232 SBSQ HOSP IP/OBS MODERATE 35: CPT | Performed by: STUDENT IN AN ORGANIZED HEALTH CARE EDUCATION/TRAINING PROGRAM

## 2022-12-29 PROCEDURE — 94664 DEMO&/EVAL PT USE INHALER: CPT

## 2022-12-29 PROCEDURE — 83735 ASSAY OF MAGNESIUM: CPT | Performed by: HOSPITALIST

## 2022-12-29 PROCEDURE — 85027 COMPLETE CBC AUTOMATED: CPT | Performed by: STUDENT IN AN ORGANIZED HEALTH CARE EDUCATION/TRAINING PROGRAM

## 2022-12-29 RX ORDER — HYDROCODONE BITARTRATE AND ACETAMINOPHEN 5; 325 MG/1; MG/1
1 TABLET ORAL EVERY 4 HOURS PRN
Qty: 10 TABLET | Refills: 0 | Status: SHIPPED | OUTPATIENT
Start: 2022-12-29

## 2022-12-29 RX ORDER — GABAPENTIN 400 MG/1
400 CAPSULE ORAL DAILY
Qty: 3 CAPSULE | Refills: 0 | Status: SHIPPED | OUTPATIENT
Start: 2022-12-29

## 2022-12-29 RX ORDER — POTASSIUM CHLORIDE 20 MEQ/1
20 TABLET, EXTENDED RELEASE ORAL DAILY
Start: 2022-12-30

## 2022-12-29 RX ADMIN — PAROXETINE HYDROCHLORIDE HEMIHYDRATE 20 MG: 20 TABLET, FILM COATED ORAL at 05:44

## 2022-12-29 RX ADMIN — APIXABAN 5 MG: 5 TABLET, FILM COATED ORAL at 08:53

## 2022-12-29 RX ADMIN — ACETAMINOPHEN 650 MG: 325 TABLET, FILM COATED ORAL at 05:44

## 2022-12-29 RX ADMIN — METOPROLOL TARTRATE 25 MG: 25 TABLET, FILM COATED ORAL at 08:53

## 2022-12-29 RX ADMIN — PANTOPRAZOLE SODIUM 40 MG: 40 TABLET, DELAYED RELEASE ORAL at 08:54

## 2022-12-29 RX ADMIN — CARBIDOPA AND LEVODOPA 2 TABLET: 25; 100 TABLET ORAL at 08:53

## 2022-12-29 RX ADMIN — FLUTICASONE PROPIONATE 2 SPRAY: 50 SPRAY, METERED NASAL at 08:54

## 2022-12-29 RX ADMIN — FUROSEMIDE 40 MG: 40 TABLET ORAL at 08:53

## 2022-12-29 RX ADMIN — Medication 10 ML: at 08:53

## 2022-12-29 RX ADMIN — ONDANSETRON 4 MG: 2 INJECTION INTRAMUSCULAR; INTRAVENOUS at 09:11

## 2022-12-29 RX ADMIN — SUCRALFATE 1 G: 1 TABLET ORAL at 08:54

## 2022-12-29 RX ADMIN — POTASSIUM CHLORIDE 20 MEQ: 750 TABLET, EXTENDED RELEASE ORAL at 08:53

## 2022-12-29 RX ADMIN — BUDESONIDE AND FORMOTEROL FUMARATE DIHYDRATE 2 PUFF: 160; 4.5 AEROSOL RESPIRATORY (INHALATION) at 06:26

## 2022-12-29 RX ADMIN — GABAPENTIN 400 MG: 400 CAPSULE ORAL at 08:53

## 2022-12-29 NOTE — PROGRESS NOTES
LOS: 9 days     Chief Complaint: Fever    Interval History: Patient reports she is resting comfortably this morning.  No acute complaints.  Continues to have low back pain.  Denies any fevers or chills.  Fever curve within normal limits and no leukocytosis.    Vital Signs  Temp:  [97.7 °F (36.5 °C)-99 °F (37.2 °C)] 98.8 °F (37.1 °C)  Heart Rate:  [84-97] 89  Resp:  [18] 18  BP: (107-133)/(61-85) 133/85    Physical Exam:  General: In no acute distress  HEENT: Oropharynx clear, moist mucous membranes  Cardiovascular: RRR  Respiratory: Clear to ascultation bilaterally, no wheezing  GI: Soft, NT/ND, + bowel sounds bilaterally, no masses  Skin: Upper extremity ecchymoses.  Extremities: No cyanosis.  Tenderness of bilateral calfs.  Access: Peripheral IV.    Antibiotics:  Anti-Infectives (From admission, onward)    Ordered     Dose/Rate Route Frequency Start Stop    12/26/22 1200  cefTRIAXone (ROCEPHIN) 2 g in sodium chloride 0.9 % 100 mL IVPB-VTB        Ordering Provider: Enzo Brush MD    2 g  200 mL/hr over 30 Minutes Intravenous Every 24 Hours 12/26/22 1500 12/27/22 1527    12/21/22 0941  cefTRIAXone (ROCEPHIN) 2 g in sodium chloride 0.9 % 100 mL IVPB-VTB        Ordering Provider: Isreal Barron MD    2 g  200 mL/hr over 30 Minutes Intravenous Every 24 Hours 12/21/22 1600 12/25/22 1530    12/21/22 0941  levoFLOXacin (LEVAQUIN) 750 mg/150 mL D5W (premix) (LEVAQUIN) 750 mg        Ordering Provider: Isreal Barron MD    750 mg Intravenous Once 12/21/22 1030 12/21/22 1250    12/20/22 1745  cefTRIAXone (ROCEPHIN) 1 g in sodium chloride 0.9 % 100 mL IVPB-VTB        Ordering Provider: Iglesia Hoffmann MD    1 g  200 mL/hr over 30 Minutes Intravenous Once 12/20/22 1747 12/20/22 1913           Results Review:     I reviewed the patient's new clinical results.    Lab Results   Component Value Date    WBC 10.13 12/29/2022    HGB 10.5 (L) 12/29/2022    HCT 31.6 (L) 12/29/2022    .0 (H) 12/29/2022      2022     Lab Results   Component Value Date    GLUCOSE 86 2022    BUN 13 2022    CREATININE 0.69 2022    EGFRIFNONA 81 2020    BCR 18.8 2022    CO2 27.0 2022    CALCIUM 8.8 2022    ALBUMIN 3.10 (L) 2022    LABIL2 2.1 2021    AST 22 2022    ALT 6 2022       Microbiology:   respiratory panel negative   blood cultures positive 2 out of 2 E. Coli   blood cultures no growth   urine culture no growth   strep pneumoniae urine antigen negative   Legionella urine antigen negative   respiratory PCR negative   blood cultures no growth to date    New imagin/27 lower extremity venous Doppler negative for DVT    Assessment    #Fever, improved  #E. coli septicemia status post 7 days of ceftriaxone  #Large hiatal hernia  #History of ureteral stricture  #Parkinson's disease  #Rheumatoid arthritis    Fever curve remains within normal limits and she has no further leukocytosis.  Continues to improve symptomatically and at this point agree with discharge.  No further antibiotics warranted at this time.

## 2022-12-29 NOTE — PLAN OF CARE
Problem: Adult Inpatient Plan of Care  Goal: Plan of Care Review  Outcome: Ongoing, Progressing  Flowsheets (Taken 12/29/2022 1005)  Progress: improving  Plan of Care Reviewed With: patient  Goal: Patient-Specific Goal (Individualized)  Outcome: Ongoing, Progressing     Problem: Fall Injury Risk  Goal: Absence of Fall and Fall-Related Injury  Outcome: Ongoing, Progressing     Problem: Heart Failure Comorbidity  Goal: Maintenance of Heart Failure Symptom Control  Outcome: Ongoing, Progressing     Problem: Hypertension Comorbidity  Goal: Blood Pressure in Desired Range  Outcome: Ongoing, Progressing     Problem: Skin Injury Risk Increased  Goal: Skin Health and Integrity  Outcome: Ongoing, Progressing   Goal Outcome Evaluation:  Plan of Care Reviewed With: patient        Progress: improving

## 2022-12-29 NOTE — DISCHARGE SUMMARY
Patient Name: Trang Liu  : 1935  MRN: [id number]    Date of Admission: 2022  Date of Discharge:  2022  Primary Care Physician: Nayla Higginbotham APRN      Chief Complaint:   Palpitations      Discharge Diagnoses     Active Hospital Problems    Diagnosis  POA   • **Pneumonia due to infectious organism, unspecified laterality, unspecified part of lung [J18.9]  Yes   • Opioid dependence (HCC) [F11.20]  Yes   • Diastolic CHF, acute (HCC) [I50.31]  Yes   • Chronic pain syndrome [G89.4]  Yes   • Hypomagnesemia [E83.42]  No   • Aortic valve stenosis [I35.0]  Yes   • E coli bacteremia [R78.81, B96.20]  Yes   • Atrial fibrillation (HCC) [I48.91]  Yes   • Hypokalemia [E87.6]  Yes   • Gastroesophageal reflux disease [K21.9]  Yes   • GERD (gastroesophageal reflux disease) [K21.9]  Yes   • Hypertension [I10]  Yes   • Parkinsons (HCC) [G20]  Yes   • Rheumatoid arthritis (HCC) [M06.9]  Yes      Resolved Hospital Problems    Diagnosis Date Resolved POA   • Acute respiratory failure with hypoxia (HCC) [J96.01] 2022 Yes   • Sepsis (HCC) [A41.9] 2022 Yes        Hospital Course     Very pleasant 86yo woman with h/o AFib, Parkinson's, and HTN who presented to PCP's office with 2 weeks of weakness and productive cough. She was sent to ER with AFib with RVR and admitted with sepsis, PNA, and E.coli bacteremia. Please see below for details of admission:     PNA  E. coli bacteremia (?urinary source)  Sepsis, resolved  -Repeat blood cultures negative x 5d  -ID followed  -s/p course of Ceftriaxone per ID recs, they recommended IV given resistance profile  -T100.9 in AM  and WBC was up to 14, no fever since and WBC down to 10 this AM  -Asked ID to weigh in again, rechecked a CXR and it was fine, RVP negative, PCT neg, venous duplex BLE and UA both unremarkable, repeat blood cultures sent are NGTD  -ID does not feel further abx therapy indicated and is okay with pt's  dc     Hypokalemia/Hypomagnesemia  -replacing K+ as needed with protocol  -added back daily KCl yesterday (was taking at home)  -Mg++ wnl after replacement     Debility  -PT recommending SNF, CCP working on it     A. fib with RVR  -RC: Metoprolol  -AC: Was on therapeutic Lovenox initially, transitioned to apixaban 12/24  -F/u with Card in 2 weeks as outpt (Dr. Marcus)  -Episode of tachycardia 12/26 AM with fever, HRs fine now     Acute diastolic heart failure  -Secondary to A. fib with RVR, resolved  -Cardiology followed  -Continue metoprolol and Lasix     NSTEMI due to demand ischemia in setting of AFib with RVR  -no ACS, no need to continue ASA     Parkinson's disease  -Sinemet continued    Chronic pain syndrome  Opioid dependence  Stable on Norco and Gabapentin     Acute hypoxic respiratory failure, resolved  -Multifactorial: PNA, acute heart failure  -Stable on room air now     • Eliquis, started this admission for AFib, sufficed for DVT ppx.  • Full code confirmed.  • Discussed with pt and CCP.  • Discharge to SNU facility today.    Day of Discharge     Subjective:  Feeling okay today--still tired. Denies cough or SOA. No CP or palp. Voiding well. Tolerating diet and eating moderate amount. Did have some mild N this AM after taking all her pills, but resolved without intervention. No F/C/NS.    Physical Exam:  Temp:  [97.7 °F (36.5 °C)-99 °F (37.2 °C)] 98.8 °F (37.1 °C)  Heart Rate:  [84-97] 89  Resp:  [18] 18  BP: (107-133)/(61-85) 133/85  Body mass index is 23.57 kg/m².  Physical Exam  Vitals and nursing note reviewed.  Constitutional:       General: She is not in acute distress.     Appearance: She is ill-appearing (chronically). She is not toxic-appearing or diaphoretic.   Cardiovascular:      Rate and Rhythm: Normal rate. Rhythm irregular.      Pulses: Normal pulses.      Heart sounds: Murmur heard.   Pulmonary:      Effort: Pulmonary effort is normal. No respiratory distress.      Breath sounds: Normal  breath sounds. No wheezing or rales.   Abdominal:      General: Bowel sounds are normal. There is no distension.      Palpations: Abdomen is soft.      Tenderness: There is no abdominal tenderness.   Musculoskeletal:         General: No swelling or deformity. Normal range of motion.   Skin:     General: Skin is warm and dry.      Capillary Refill: Capillary refill takes less than 2 seconds.   Neurological:      General: No focal deficit present.      Mental Status: She is alert and oriented to person, place, and time. Mental status is at baseline.    Psychiatric:         Mood and Affect: Mood normal.         Behavior: Behavior normal.      Comments: Very pleasant         Consultants     Consult Orders (all) (From admission, onward)     Start     Ordered    12/26/22 1342  Inpatient Infectious Diseases Consult  Once        Specialty:  Infectious Diseases  Provider:  Victorino Hoffman,     12/26/22 1342    12/22/22 0903  Inpatient Infectious Diseases Consult  Once        Specialty:  Infectious Diseases  Provider:  Arnold Carias MD    12/22/22 0902    12/21/22 1000  Inpatient Case Management  Consult  Once        Provider:  (Not yet assigned)    12/20/22 2229    12/20/22 1819  Inpatient Cardiology Consult  Once        Specialty:  Cardiology  Provider:  Ranjan Sarmiento MD    12/20/22 1818    12/20/22 1749  [facility] (on-call MD unless specified) Details  Once        Specialty:  Hospitalist  Provider:  (Not yet assigned)    12/20/22 1748              Procedures     * Surgery not found *      Imaging Results (All)     Procedure Component Value Units Date/Time    XR Chest PA & Lateral [[id number]] Collected: 12/26/22 1452     Updated: 12/26/22 1500    Narrative:      XR CHEST PA AND LATERAL-     Clinical: Follow-up pneumonia, hypoxia     COMPARISON examination 12/20/2022     FINDINGS: Sizable hiatal hernia on the right is again demonstrated. Some  bowel gas is noted within. Cardiac size  stable. There is atherosclerotic  calcification of the aorta. There is a small amount of pleural fluid  blunting the posterior costophrenic sulci. No pulmonary edema or lung  consolidation is demonstrated. The remainder of examination is  unremarkable.     This report was finalized on 12/26/2022 2:57 PM by Dr. Torin Marte M.D.       XR Chest 1 View [[id number]] Collected: 12/20/22 1630     Updated: 12/20/22 1637    Narrative:      XR CHEST 1 VW-     HISTORY:  Palpitations, atrial fibrillation.     COMPARISON:  Chest and left rib radiographs 3/11/2020. CT abdomen and  pelvis 3/11/2020     FINDINGS:    A single view of the chest was obtained. The cardiac silhouette and  mediastinal and hilar contours are not significantly changed. There is  calcific aortic atherosclerosis. Masslike density with corresponding  areas of lucency projecting over the right lung base correspond to a  large fat and colon containing hernia on prior CT from 2020,  incompletely assessed on the current examination but similar to slightly  larger compared to prior radiographs from 2020. This limits evaluation  of the right lung base. No definite new focal consolidation is  identified. Pleural spaces are clear. There is degenerative disc  disease.     This report was finalized on 12/20/2022 4:33 PM by Dr. Indigo Heart M.D.           Results for orders placed during the hospital encounter of 12/20/22    Duplex Venous Lower Extremity - Bilateral CAR    Interpretation Summary  •  Normal bilateral lower extremity venous duplex scan.    Results for orders placed during the hospital encounter of 12/20/22    Adult Transthoracic Echo Complete W/ Cont if Necessary Per Protocol    Interpretation Summary  •  Left ventricular systolic function is normal. Calculated left ventricular EF = 58.8% Normal left ventricular cavity size and wall thickness noted. All left ventricular wall segments contract normally. Left ventricular diastolic function was  indeterminate.  •  Left atrial volume is mildly increased. The right atrial cavity is mildly dilated  •  There is moderate calcification of the aortic valve. The aortic valve appears trileaflet. No aortic valve regurgitation is present. Moderate aortic valve stenosis is present. Aortic valve area is 0.72 cm2. The LVOT diameter is underestimated and the PACHECO is also undderestimated Aortic valve mean pressure gradient is 15.2 mmHg.  •  Mitral annular calcification is present. Trace mitral valve regurgitation is present. No significant mitral valve stenosis is present.  •  Trace tricuspid valve regurgitation is present. Estimated right ventricular systolic pressure from tricuspid regurgitation is normal (<35 mmHg). Calculated right ventricular systolic pressure from tricuspid regurgitation is 29.2 mmHg.    Pertinent Labs     Results from last 7 days   Lab Units 12/29/22 0419 12/28/22 0530 12/27/22 0450 12/26/22 0457   WBC 10*3/mm3 10.13 10.62 12.29* 14.28*   HEMOGLOBIN g/dL 10.5* 10.9* 10.1* 11.2*   PLATELETS 10*3/mm3 337 343 259 261     Results from last 7 days   Lab Units 12/29/22 0419 12/28/22 2034 12/28/22 0530 12/27/22 0450 12/26/22 0457   SODIUM mmol/L 137  --  137 132* 135*   POTASSIUM mmol/L 4.7 4.3 3.1* 3.9 4.4   CHLORIDE mmol/L 102  --  98 98 97*   CO2 mmol/L 27.0  --  26.5 23.8 26.0   BUN mg/dL 13  --  11 14 12   CREATININE mg/dL 0.69  --  0.60 0.59 0.75   GLUCOSE mg/dL 86  --  99 118* 97   EGFR mL/min/1.73 84.6  --  87.5 87.9 77.6       Results from last 7 days   Lab Units 12/29/22 0419 12/28/22 0530 12/27/22 0450 12/26/22 0457   CALCIUM mg/dL 8.8 8.8 8.5* 9.1   MAGNESIUM mg/dL 1.8 1.8 1.9 2.2               Invalid input(s): LDLCALC  Results from last 7 days   Lab Units 12/27/22  1132 12/27/22  1130   BLOODCX  No growth at 24 hours No growth at 24 hours     Results from last 7 days   Lab Units 12/27/22  0833   COVID19  Not Detected       Test Results Pending at Discharge     Pending Labs      Order Current Status    Blood Culture - Blood, Hand, Left Preliminary result    Blood Culture - Blood, Hand, Right Preliminary result          Discharge Details        Discharge Medications      New Medications      Instructions Start Date   apixaban 5 MG tablet tablet  Commonly known as: ELIQUIS   5 mg, Oral, Every 12 Hours Scheduled      metoprolol tartrate 25 MG tablet  Commonly known as: LOPRESSOR   25 mg, Oral, Every 12 Hours Scheduled      potassium chloride 20 MEQ CR tablet  Commonly known as: K-DUR,KLOR-CON   20 mEq, Oral, Daily   Start Date: December 30, 2022        Changes to Medications      Instructions Start Date   HYDROcodone-acetaminophen 5-325 MG per tablet  Commonly known as: NORCO  What changed:   · how much to take  · when to take this   1 tablet, Oral, Every 4 Hours PRN         Continue These Medications      Instructions Start Date   acetaminophen 500 MG tablet  Commonly known as: TYLENOL   1,000 mg, Oral, Every 6 Hours PRN      carbidopa-levodopa  MG per tablet  Commonly known as: SINEMET   2 tablets, Oral, 4 Times Daily      fluticasone 50 MCG/ACT nasal spray  Commonly known as: FLONASE   2 sprays, Nasal, Daily      fluticasone-salmeterol 250-50 MCG/DOSE DISKUS  Commonly known as: ADVAIR   1 puff, Inhalation, 2 Times Daily - RT      furosemide 40 MG tablet  Commonly known as: LASIX   40 mg, Oral, Daily      gabapentin 400 MG capsule  Commonly known as: NEURONTIN   400 mg, Oral, Daily      pantoprazole 40 MG EC tablet  Commonly known as: Protonix   40 mg, Oral, Daily      PARoxetine 20 MG tablet  Commonly known as: PAXIL   20 mg, Oral, Every Morning      sennosides-docusate 8.6-50 MG per tablet  Commonly known as: PERICOLACE   2 tablets, Oral, 2 Times Daily PRN      sucralfate 1 g tablet  Commonly known as: CARAFATE   1 g, Oral, 4 Times Daily      vitamin B-12 1000 MCG tablet  Commonly known as: CYANOCOBALAMIN   1,000 mcg, Oral, Daily      vitamin D 1.25 MG (78530 UT) capsule  capsule  Commonly known as: ERGOCALCIFEROL   50,000 Units, Oral, Every 7 Days         Stop These Medications    amLODIPine 10 MG tablet  Commonly known as: NORVASC     chlordiazePOXIDE 5 MG capsule  Commonly known as: LIBRIUM     DRY EYES OP     potassium chloride 10 MEQ CR tablet            Allergies   Allergen Reactions   • Cimetidine Unknown (See Comments)     unknown   • Codeine Itching   • Doxycycline Hives   • Guaifenesin & Derivatives Unknown (See Comments)     Unknown     • Nitrofuran Derivatives Itching   • Oxaprozin Itching   • Penicillins Itching     unknown   • Sulfa Antibiotics Rash     And itching       Discharge Disposition:  Skilled Nursing Facility (DC - External)      Discharge Diet:  Diet Order   Procedures   • Diet: Cardiac Diets; Healthy Heart (2-3 Na+); Texture: Regular Texture (IDDSI 7); Fluid Consistency: Thin (IDDSI 0)       Discharge Activity:   as tolerated    CODE STATUS:    Code Status and Medical Interventions:   Ordered at: 12/20/22 1818     Code Status (Patient has no pulse and is not breathing):    CPR (Attempt to Resuscitate)     Medical Interventions (Patient has pulse or is breathing):    Full Support       No future appointments.  Additional Instructions for the Follow-ups that You Need to Schedule     Discharge Follow-up with PCP   As directed       Currently Documented PCP:    Nayla Higginbotham APRN    PCP Phone Number:    [phone]     Follow Up Details: Neftaly NP (PCP) after dc from facility         Discharge Follow-up with Specialty: Cardiology (Dr. Marcus); 2 Weeks   As directed      Specialty: Cardiology (Dr. Marcus)    Follow Up: 2 Weeks         Discharge Follow-up with Specified Provider: Medical Staff at facility; 1 Day   As directed      To: Medical Staff at facility    Follow Up: 1 Day            Contact information for follow-up providers     Nayla Higginbotham APRN .    Specialty: Family Medicine  Why: Neftaly NP (PCP) after dc from facility  Contact  information:  [address]  [facility] [address]  [phone]                   Contact information for after-discharge care     Destination     [facility] .    Service: Skilled Nursing  Contact information:  [address]  [facility] [address] [phone]                             Additional Instructions for the Follow-ups that You Need to Schedule     Discharge Follow-up with PCP   As directed       Currently Documented PCP:    Nayla Higginbotham APRN    PCP Phone Number:    [phone]     Follow Up Details: Neftaly TYLER (PCP) after dc from facility         Discharge Follow-up with Specialty: Cardiology (Dr. Marcus); 2 Weeks   As directed      Specialty: Cardiology (Dr. Marcus)    Follow Up: 2 Weeks         Discharge Follow-up with Specified Provider: Medical Staff at facility; 1 Day   As directed      To: Medical Staff at facility    Follow Up: 1 Day           Time Spent on Discharge:  Greater than 30 minutes      Enzo Brush MD  [facility]  12/29/22  10:53 EST

## 2022-12-29 NOTE — DISCHARGE PLACEMENT REQUEST
Trang Liu (87 y.o. Female)     Date of Birth   1935    Social Security Number       Address   [address] [place], [place] [address]    Home Phone   [phone]    MRN   [id number]       Restorationist   [facility]    Marital Status                               Admission Date   22    Admission Type   Emergency    Admitting Provider   Josh Petersen MD    Attending Provider   Enzo Brush MD    Department, Room/Bed   [address], [address]/1       Discharge Date       Discharge Disposition   Skilled Nursing Facility (DC - External)    Discharge Destination                               Attending Provider: Enzo Brush MD    Allergies: Cimetidine, Codeine, Doxycycline, Guaifenesin & Derivatives, Nitrofuran Derivatives, Oxaprozin, Penicillins, Sulfa Antibiotics    Isolation: None   Infection: None   Code Status: CPR    Ht: 167.6 cm (66\")   Wt: 66.2 kg (146 lb)    Admission Cmt: None   Principal Problem: Pneumonia due to infectious organism, unspecified laterality, unspecified part of lung [J18.9]                 Active Insurance as of 2022     Primary Coverage     Payor Plan Insurance Group Employer/Plan Group    ANTHEM MEDICARE REPLACEMENT ANTHEM MEDICARE ADVANTAGE [id number]     Payor Plan Address Payor Plan Phone Number Payor Plan Fax Number Effective Dates    [address] [phone]  2020 - None Entered    [facility] [id number]       Subscriber Name Subscriber Birth Date Member ID       TRANG LIU 1935 [id number]                 Emergency Contacts      (Rel.) Home Phone Work Phone Mobile Phone    Bekah Doherty (Daughter) [phone] -- [phone]    KINGS ASHLEY (Daughter) [phone] -- --                 Discharge Summary      Enzo Brush MD at 22 1053              Patient Name: Trang Liu  : 1935  MRN: [id number]    Date of Admission: 2022  Date of Discharge:   12/29/2022  Primary Care Physician: Nayla Higginbotham APRN      Chief Complaint:   Palpitations      Discharge Diagnoses     Active Hospital Problems    Diagnosis  POA   • **Pneumonia due to infectious organism, unspecified laterality, unspecified part of lung [J18.9]  Yes   • Opioid dependence (HCC) [F11.20]  Yes   • Diastolic CHF, acute (HCC) [I50.31]  Yes   • Chronic pain syndrome [G89.4]  Yes   • Hypomagnesemia [E83.42]  No   • Aortic valve stenosis [I35.0]  Yes   • E coli bacteremia [R78.81, B96.20]  Yes   • Atrial fibrillation (HCC) [I48.91]  Yes   • Hypokalemia [E87.6]  Yes   • Gastroesophageal reflux disease [K21.9]  Yes   • GERD (gastroesophageal reflux disease) [K21.9]  Yes   • Hypertension [I10]  Yes   • Parkinsons (HCC) [G20]  Yes   • Rheumatoid arthritis (HCC) [M06.9]  Yes      Resolved Hospital Problems    Diagnosis Date Resolved POA   • Acute respiratory failure with hypoxia (HCC) [J96.01] 12/24/2022 Yes   • Sepsis (HCC) [A41.9] 12/24/2022 Yes        Hospital Course     Very pleasant 86yo woman with h/o AFib, Parkinson's, and HTN who presented to PCP's office with 2 weeks of weakness and productive cough. She was sent to ER with AFib with RVR and admitted with sepsis, PNA, and E.coli bacteremia. Please see below for details of admission:     PNA  E. coli bacteremia (?urinary source)  Sepsis, resolved  -Repeat blood cultures negative x 5d  -ID followed  -s/p course of Ceftriaxone per ID recs, they recommended IV given resistance profile  -T100.9 in AM 12/26 and WBC was up to 14, no fever since and WBC down to 10 this AM  -Asked ID to weigh in again, rechecked a CXR and it was fine, RVP negative, PCT neg, venous duplex BLE and UA both unremarkable, repeat blood cultures sent are NGTD  -ID does not feel further abx therapy indicated and is okay with pt's dc     Hypokalemia/Hypomagnesemia  -replacing K+ as needed with protocol  -added back daily KCl yesterday (was taking at home)  -Mg++ wnl after  replacement     Debility  -PT recommending SNF, CCP working on it     A. fib with RVR  -RC: Metoprolol  -AC: Was on therapeutic Lovenox initially, transitioned to apixaban 12/24  -F/u with Card in 2 weeks as outpt (Dr. Marcus)  -Episode of tachycardia 12/26 AM with fever, HRs fine now     Acute diastolic heart failure  -Secondary to A. fib with RVR, resolved  -Cardiology followed  -Continue metoprolol and Lasix     NSTEMI due to demand ischemia in setting of AFib with RVR  -no ACS, no need to continue ASA     Parkinson's disease  -Sinemet continued    Chronic pain syndrome  Opioid dependence  Stable on Norco and Gabapentin     Acute hypoxic respiratory failure, resolved  -Multifactorial: PNA, acute heart failure  -Stable on room air now     • Eliquis, started this admission for AFib, sufficed for DVT ppx.  • Full code confirmed.  • Discussed with pt and CCP.  • Discharge to SNU facility today.    Day of Discharge     Subjective:  Feeling okay today--still tired. Denies cough or SOA. No CP or palp. Voiding well. Tolerating diet and eating moderate amount. Did have some mild N this AM after taking all her pills, but resolved without intervention. No F/C/NS.    Physical Exam:  Temp:  [97.7 °F (36.5 °C)-99 °F (37.2 °C)] 98.8 °F (37.1 °C)  Heart Rate:  [84-97] 89  Resp:  [18] 18  BP: (107-133)/(61-85) 133/85  Body mass index is 23.57 kg/m².  Physical Exam  Vitals and nursing note reviewed.  Constitutional:       General: She is not in acute distress.     Appearance: She is ill-appearing (chronically). She is not toxic-appearing or diaphoretic.   Cardiovascular:      Rate and Rhythm: Normal rate. Rhythm irregular.      Pulses: Normal pulses.      Heart sounds: Murmur heard.   Pulmonary:      Effort: Pulmonary effort is normal. No respiratory distress.      Breath sounds: Normal breath sounds. No wheezing or rales.   Abdominal:      General: Bowel sounds are normal. There is no distension.      Palpations: Abdomen is soft.       Tenderness: There is no abdominal tenderness.   Musculoskeletal:         General: No swelling or deformity. Normal range of motion.   Skin:     General: Skin is warm and dry.      Capillary Refill: Capillary refill takes less than 2 seconds.   Neurological:      General: No focal deficit present.      Mental Status: She is alert and oriented to person, place, and time. Mental status is at baseline.    Psychiatric:         Mood and Affect: Mood normal.         Behavior: Behavior normal.      Comments: Very pleasant         Consultants     Consult Orders (all) (From admission, onward)     Start     Ordered    12/26/22 1342  Inpatient Infectious Diseases Consult  Once        Specialty:  Infectious Diseases  Provider:  Victorino Hoffman,     12/26/22 1342    12/22/22 0903  Inpatient Infectious Diseases Consult  Once        Specialty:  Infectious Diseases  Provider:  Arnold Carias MD    12/22/22 0902    12/21/22 1000  Inpatient Case Management  Consult  Once        Provider:  (Not yet assigned)    12/20/22 2229    12/20/22 1819  Inpatient Cardiology Consult  Once        Specialty:  Cardiology  Provider:  Ranjan Sarmiento MD    12/20/22 1818    12/20/22 1749  [facility] (on-call MD unless specified) Details  Once        Specialty:  Hospitalist  Provider:  (Not yet assigned)    12/20/22 1748              Procedures     * Surgery not found *      Imaging Results (All)     Procedure Component Value Units Date/Time    XR Chest PA & Lateral [[id number]] Collected: 12/26/22 1452     Updated: 12/26/22 1500    Narrative:      XR CHEST PA AND LATERAL-     Clinical: Follow-up pneumonia, hypoxia     COMPARISON examination 12/20/2022     FINDINGS: Sizable hiatal hernia on the right is again demonstrated. Some  bowel gas is noted within. Cardiac size stable. There is atherosclerotic  calcification of the aorta. There is a small amount of pleural fluid  blunting the posterior costophrenic sulci. No  pulmonary edema or lung  consolidation is demonstrated. The remainder of examination is  unremarkable.     This report was finalized on 12/26/2022 2:57 PM by Dr. Torin Marte M.D.       XR Chest 1 View [[id number]] Collected: 12/20/22 1630     Updated: 12/20/22 1637    Narrative:      XR CHEST 1 VW-     HISTORY:  Palpitations, atrial fibrillation.     COMPARISON:  Chest and left rib radiographs 3/11/2020. CT abdomen and  pelvis 3/11/2020     FINDINGS:    A single view of the chest was obtained. The cardiac silhouette and  mediastinal and hilar contours are not significantly changed. There is  calcific aortic atherosclerosis. Masslike density with corresponding  areas of lucency projecting over the right lung base correspond to a  large fat and colon containing hernia on prior CT from 2020,  incompletely assessed on the current examination but similar to slightly  larger compared to prior radiographs from 2020. This limits evaluation  of the right lung base. No definite new focal consolidation is  identified. Pleural spaces are clear. There is degenerative disc  disease.     This report was finalized on 12/20/2022 4:33 PM by Dr. Indigo Heart M.D.           Results for orders placed during the hospital encounter of 12/20/22    Duplex Venous Lower Extremity - Bilateral CAR    Interpretation Summary  •  Normal bilateral lower extremity venous duplex scan.    Results for orders placed during the hospital encounter of 12/20/22    Adult Transthoracic Echo Complete W/ Cont if Necessary Per Protocol    Interpretation Summary  •  Left ventricular systolic function is normal. Calculated left ventricular EF = 58.8% Normal left ventricular cavity size and wall thickness noted. All left ventricular wall segments contract normally. Left ventricular diastolic function was indeterminate.  •  Left atrial volume is mildly increased. The right atrial cavity is mildly dilated  •  There is moderate calcification of the aortic valve.  The aortic valve appears trileaflet. No aortic valve regurgitation is present. Moderate aortic valve stenosis is present. Aortic valve area is 0.72 cm2. The LVOT diameter is underestimated and the PACHECO is also undderestimated Aortic valve mean pressure gradient is 15.2 mmHg.  •  Mitral annular calcification is present. Trace mitral valve regurgitation is present. No significant mitral valve stenosis is present.  •  Trace tricuspid valve regurgitation is present. Estimated right ventricular systolic pressure from tricuspid regurgitation is normal (<35 mmHg). Calculated right ventricular systolic pressure from tricuspid regurgitation is 29.2 mmHg.    Pertinent Labs     Results from last 7 days   Lab Units 12/29/22 0419 12/28/22  0530 12/27/22 0450 12/26/22 0457   WBC 10*3/mm3 10.13 10.62 12.29* 14.28*   HEMOGLOBIN g/dL 10.5* 10.9* 10.1* 11.2*   PLATELETS 10*3/mm3 337 343 259 261     Results from last 7 days   Lab Units 12/29/22 0419 12/28/22 2034 12/28/22 0530 12/27/22 0450 12/26/22 0457   SODIUM mmol/L 137  --  137 132* 135*   POTASSIUM mmol/L 4.7 4.3 3.1* 3.9 4.4   CHLORIDE mmol/L 102  --  98 98 97*   CO2 mmol/L 27.0  --  26.5 23.8 26.0   BUN mg/dL 13  --  11 14 12   CREATININE mg/dL 0.69  --  0.60 0.59 0.75   GLUCOSE mg/dL 86  --  99 118* 97   EGFR mL/min/1.73 84.6  --  87.5 87.9 77.6       Results from last 7 days   Lab Units 12/29/22 0419 12/28/22  0530 12/27/22 0450 12/26/22 0457   CALCIUM mg/dL 8.8 8.8 8.5* 9.1   MAGNESIUM mg/dL 1.8 1.8 1.9 2.2               Invalid input(s): LDLCALC  Results from last 7 days   Lab Units 12/27/22  1132 12/27/22  1130   BLOODCX  No growth at 24 hours No growth at 24 hours     Results from last 7 days   Lab Units 12/27/22  0833   COVID19  Not Detected       Test Results Pending at Discharge     Pending Labs     Order Current Status    Blood Culture - Blood, Hand, Left Preliminary result    Blood Culture - Blood, Hand, Right Preliminary result          Discharge Details         Discharge Medications      New Medications      Instructions Start Date   apixaban 5 MG tablet tablet  Commonly known as: ELIQUIS   5 mg, Oral, Every 12 Hours Scheduled      metoprolol tartrate 25 MG tablet  Commonly known as: LOPRESSOR   25 mg, Oral, Every 12 Hours Scheduled      potassium chloride 20 MEQ CR tablet  Commonly known as: K-DUR,KLOR-CON   20 mEq, Oral, Daily   Start Date: December 30, 2022        Changes to Medications      Instructions Start Date   HYDROcodone-acetaminophen 5-325 MG per tablet  Commonly known as: NORCO  What changed:   · how much to take  · when to take this   1 tablet, Oral, Every 4 Hours PRN         Continue These Medications      Instructions Start Date   acetaminophen 500 MG tablet  Commonly known as: TYLENOL   1,000 mg, Oral, Every 6 Hours PRN      carbidopa-levodopa  MG per tablet  Commonly known as: SINEMET   2 tablets, Oral, 4 Times Daily      fluticasone 50 MCG/ACT nasal spray  Commonly known as: FLONASE   2 sprays, Nasal, Daily      fluticasone-salmeterol 250-50 MCG/DOSE DISKUS  Commonly known as: ADVAIR   1 puff, Inhalation, 2 Times Daily - RT      furosemide 40 MG tablet  Commonly known as: LASIX   40 mg, Oral, Daily      gabapentin 400 MG capsule  Commonly known as: NEURONTIN   400 mg, Oral, Daily      pantoprazole 40 MG EC tablet  Commonly known as: Protonix   40 mg, Oral, Daily      PARoxetine 20 MG tablet  Commonly known as: PAXIL   20 mg, Oral, Every Morning      sennosides-docusate 8.6-50 MG per tablet  Commonly known as: PERICOLACE   2 tablets, Oral, 2 Times Daily PRN      sucralfate 1 g tablet  Commonly known as: CARAFATE   1 g, Oral, 4 Times Daily      vitamin B-12 1000 MCG tablet  Commonly known as: CYANOCOBALAMIN   1,000 mcg, Oral, Daily      vitamin D 1.25 MG (59587 UT) capsule capsule  Commonly known as: ERGOCALCIFEROL   50,000 Units, Oral, Every 7 Days         Stop These Medications    amLODIPine 10 MG tablet  Commonly known as: NORVASC      chlordiazePOXIDE 5 MG capsule  Commonly known as: LIBRIUM     DRY EYES OP     potassium chloride 10 MEQ CR tablet            Allergies   Allergen Reactions   • Cimetidine Unknown (See Comments)     unknown   • Codeine Itching   • Doxycycline Hives   • Guaifenesin & Derivatives Unknown (See Comments)     Unknown     • Nitrofuran Derivatives Itching   • Oxaprozin Itching   • Penicillins Itching     unknown   • Sulfa Antibiotics Rash     And itching       Discharge Disposition:  Skilled Nursing Facility (DC - External)      Discharge Diet:  Diet Order   Procedures   • Diet: Cardiac Diets; Healthy Heart (2-3 Na+); Texture: Regular Texture (IDDSI 7); Fluid Consistency: Thin (IDDSI 0)       Discharge Activity:   as tolerated    CODE STATUS:    Code Status and Medical Interventions:   Ordered at: 12/20/22 1818     Code Status (Patient has no pulse and is not breathing):    CPR (Attempt to Resuscitate)     Medical Interventions (Patient has pulse or is breathing):    Full Support       No future appointments.  Additional Instructions for the Follow-ups that You Need to Schedule     Discharge Follow-up with PCP   As directed       Currently Documented PCP:    Nayla Higginbotham APRN    PCP Phone Number:    [phone]     Follow Up Details: Neftaly TYLER (PCP) after dc from facility         Discharge Follow-up with Specialty: Cardiology (Dr. Marcus); 2 Weeks   As directed      Specialty: Cardiology (Dr. Marcus)    Follow Up: 2 Weeks         Discharge Follow-up with Specified Provider: Medical Staff at facility; 1 Day   As directed      To: Medical Staff at facility    Follow Up: 1 Day            Contact information for follow-up providers     Nayla Higginbotham APRN .    Specialty: Family Medicine  Why: Neftaly NP (PCP) after dc from facility  Contact information:  [address]  [facility] [address] [phone]                   Contact information for after-discharge care     Destination     [facility]  [facility] .    Service: Skilled Nursing  Contact information:  [address]  [place] [address]  [phone]                             Additional Instructions for the Follow-ups that You Need to Schedule     Discharge Follow-up with PCP   As directed       Currently Documented PCP:    Nayla Higginbotham APRN    PCP Phone Number:    [phone]     Follow Up Details: Neftaly NP (PCP) after dc from facility         Discharge Follow-up with Specialty: Cardiology (Dr. Marcus); 2 Weeks   As directed      Specialty: Cardiology (Dr. Marcus)    Follow Up: 2 Weeks         Discharge Follow-up with Specified Provider: Medical Staff at facility; 1 Day   As directed      To: Medical Staff at facility    Follow Up: 1 Day           Time Spent on Discharge:  Greater than 30 minutes      Francisca Brush MD  [facility]  12/29/22  10:53 EST                Electronically signed by Francisca Brush MD at 12/29/22 1058       Discharge Order (From admission, onward)     Start     Ordered    12/29/22 1049  Discharge patient  Once        Expected Discharge Date: 12/29/22    Expected Discharge Time: Morning    Discharge Disposition: Skilled Nursing Facility (DC - External)    Physician of Record for Attribution - Please select from Treatment Team: FRANCISCA BRUSH [[id number]]    Review needed by CMO to determine Physician of Record: No       Question Answer Comment   Physician of Record for Attribution - Please select from Treatment Team FRANCISCA BRUSH    Review needed by CMO to determine Physician of Record No        12/29/22 3802

## 2022-12-29 NOTE — PLAN OF CARE
Problem: Adult Inpatient Plan of Care  Goal: Plan of Care Review  12/29/2022 1105 by Julio César Iverson RN  Outcome: Adequate for Care Transition  12/29/2022 1005 by Julio César Iverson RN  Outcome: Ongoing, Progressing  Flowsheets (Taken 12/29/2022 1005)  Progress: improving  Plan of Care Reviewed With: patient  Goal: Patient-Specific Goal (Individualized)  12/29/2022 1105 by Julio César Iverson RN  Outcome: Adequate for Care Transition  12/29/2022 1005 by Julio César Iverson RN  Outcome: Ongoing, Progressing  Goal: Absence of Hospital-Acquired Illness or Injury  Outcome: Adequate for Care Transition  Intervention: Identify and Manage Fall Risk  Recent Flowsheet Documentation  Taken 12/29/2022 1015 by Julio César Iverson RN  Safety Promotion/Fall Prevention:   safety round/check completed   activity supervised   assistive device/personal items within reach  Taken 12/29/2022 0853 by Julio César Iverson RN  Safety Promotion/Fall Prevention:   activity supervised   assistive device/personal items within reach   clutter free environment maintained   safety round/check completed  Intervention: Prevent Skin Injury  Recent Flowsheet Documentation  Taken 12/29/2022 0853 by Julio César Iverson RN  Skin Protection: adhesive use limited  Intervention: Prevent and Manage VTE (Venous Thromboembolism) Risk  Recent Flowsheet Documentation  Taken 12/29/2022 1015 by Julio César Iverson RN  Activity Management: activity adjusted per tolerance  Taken 12/29/2022 0853 by Julio César Iverson RN  Activity Management: activity adjusted per tolerance  VTE Prevention/Management: (eliquis) other (see comments)  Intervention: Prevent Infection  Recent Flowsheet Documentation  Taken 12/29/2022 1015 by Julio César Iverson RN  Infection Prevention: rest/sleep promoted  Taken 12/29/2022 0853 by Julio César Iverson RN  Infection Prevention: rest/sleep promoted  Goal: Optimal Comfort and Wellbeing  Outcome: Adequate for  Care Transition  Intervention: Provide Person-Centered Care  Recent Flowsheet Documentation  Taken 12/29/2022 0853 by Julio César Iverson RN  Trust Relationship/Rapport:   care explained   choices provided  Goal: Readiness for Transition of Care  Outcome: Adequate for Care Transition     Problem: Fall Injury Risk  Goal: Absence of Fall and Fall-Related Injury  12/29/2022 1105 by Julio César Iverson RN  Outcome: Adequate for Care Transition  12/29/2022 1005 by Julio César Iverson RN  Outcome: Ongoing, Progressing  Intervention: Identify and Manage Contributors  Recent Flowsheet Documentation  Taken 12/29/2022 1015 by Julio César Iverson RN  Medication Review/Management: medications reviewed  Taken 12/29/2022 0853 by Julio César Iverson RN  Medication Review/Management: medications reviewed  Intervention: Promote Injury-Free Environment  Recent Flowsheet Documentation  Taken 12/29/2022 1015 by Julio César Iverson RN  Safety Promotion/Fall Prevention:   safety round/check completed   activity supervised   assistive device/personal items within reach  Taken 12/29/2022 0853 by Julio César Iverson RN  Safety Promotion/Fall Prevention:   activity supervised   assistive device/personal items within reach   clutter free environment maintained   safety round/check completed     Problem: Heart Failure Comorbidity  Goal: Maintenance of Heart Failure Symptom Control  12/29/2022 1105 by Julio César Iverson RN  Outcome: Adequate for Care Transition  12/29/2022 1005 by Julio César Iverson RN  Outcome: Ongoing, Progressing  Intervention: Maintain Heart Failure-Management  Recent Flowsheet Documentation  Taken 12/29/2022 1015 by Julio César Iverson RN  Medication Review/Management: medications reviewed  Taken 12/29/2022 0853 by Julio César Iverson RN  Medication Review/Management: medications reviewed     Problem: Hypertension Comorbidity  Goal: Blood Pressure in Desired Range  12/29/2022 1105 by Kishor  ANDRÉS Angela  Outcome: Adequate for Care Transition  12/29/2022 1005 by Julio César Iverson RN  Outcome: Ongoing, Progressing  Intervention: Maintain Blood Pressure Management  Recent Flowsheet Documentation  Taken 12/29/2022 1015 by Julio César Iverson RN  Medication Review/Management: medications reviewed  Taken 12/29/2022 0853 by Julio César Iverson RN  Medication Review/Management: medications reviewed     Problem: Osteoarthritis Comorbidity  Goal: Maintenance of Osteoarthritis Symptom Control  Outcome: Adequate for Care Transition  Intervention: Maintain Osteoarthritis Symptom Control  Recent Flowsheet Documentation  Taken 12/29/2022 1015 by Julio César Iverson RN  Activity Management: activity adjusted per tolerance  Medication Review/Management: medications reviewed  Taken 12/29/2022 0853 by Julio César Iverson RN  Activity Management: activity adjusted per tolerance  Medication Review/Management: medications reviewed     Problem: Pain Chronic (Persistent) (Comorbidity Management)  Goal: Acceptable Pain Control and Functional Ability  Outcome: Adequate for Care Transition  Intervention: Manage Persistent Pain  Recent Flowsheet Documentation  Taken 12/29/2022 1015 by Julio César Iverson RN  Medication Review/Management: medications reviewed  Taken 12/29/2022 0853 by Julio César Iverson RN  Medication Review/Management: medications reviewed  Intervention: Optimize Psychosocial Wellbeing  Recent Flowsheet Documentation  Taken 12/29/2022 0853 by Julio César Iverson RN  Supportive Measures: self-care encouraged  Diversional Activities: (rest) other (see comments)  Family/Support System Care: support provided     Problem: Skin Injury Risk Increased  Goal: Skin Health and Integrity  12/29/2022 1105 by Julio César Iverson RN  Outcome: Adequate for Care Transition  12/29/2022 1005 by Julio César Iverson RN  Outcome: Ongoing, Progressing  Intervention: Optimize Skin Protection  Recent Flowsheet  Documentation  Taken 12/29/2022 0853 by Julio César Iverson, RN  Pressure Reduction Techniques: frequent weight shift encouraged  Pressure Reduction Devices: alternating pressure pump (ADD)  Skin Protection: adhesive use limited   Goal Outcome Evaluation:  Plan of Care Reviewed With: patient        Progress: improving

## 2022-12-29 NOTE — CASE MANAGEMENT/SOCIAL WORK
Continued Stay Note  Psychiatric     Patient Name: Trang Liu  MRN: [id number]  Today's Date: 12/29/2022    Admit Date: 12/20/2022    Plan: Pt has bed at [facility] and pre cert obtained. Family states they will be able to transport her today around 1300   Discharge Plan     Row Name 12/29/22 0816       Plan    Plan Pt has bed at [facility] and pre cert obtained. Family states they will be able to transport her today around 1300    Patient/Family in Agreement with Plan yes    Plan Comments Per Sis/Sig, Pre cert has been obtained and bed ready today at [facility]. Family to transport to facility around 1300 today. Notified Dr Brush. Saroj Jones RN-BC               Discharge Codes    No documentation.               Expected Discharge Date and Time     Expected Discharge Date Expected Discharge Time    Dec 28, 2022             Saroj Jones RN

## 2022-12-29 NOTE — PLAN OF CARE
Goal Outcome Evaluation:  Plan of Care Reviewed With: patient        Progress: no change  Outcome Evaluation: HR slightly elevated at start of shift - resolved with scheduled meds. Complaining of back pain - PRN tylenol x1 and norco x1 with relief of pain. Turns completed q 2 hrs. Purewick in place with good output. Potassium recheck = 4.3. No other changes. Planning to D/C to [facility] [facility] today; transport via  van still needed. [facility]

## 2023-01-01 LAB
BACTERIA SPEC AEROBE CULT: NORMAL
BACTERIA SPEC AEROBE CULT: NORMAL

## 2023-01-30 NOTE — PROGRESS NOTES
Name: Trang Liu ADMIT: 2022   : 1935  PCP: Nayla Higginbotham APRN    MRN: [id number] LOS: 7 days   AGE/SEX: 87 y.o. female  ROOM: [facility]     Subjective   Subjective   Feeling okay again today--just tired. Denies cough or SOA. No CP or palp. Voiding well. Tolerating diet and eating moderate amount. Did have some mild N this AM but resolved without intervention. No F/C/NS.    Review of Systems     as above    Objective   Objective   Vital Signs  Temp:  [97 °F (36.1 °C)-99.4 °F (37.4 °C)] 99.1 °F (37.3 °C)  Heart Rate:  [] 98  Resp:  [17-18] 18  BP: (102-136)/(66-99) 102/75  SpO2:  [95 %-99 %] 96 %  on  Flow (L/min):  [2] 2;   Device (Oxygen Therapy): room air  Body mass index is 23.57 kg/m².     (No change in exam)    Physical Exam  Vitals and nursing note reviewed.   Constitutional:       General: She is not in acute distress.     Appearance: She is ill-appearing (chronically). She is not toxic-appearing or diaphoretic.   HENT:      Head: Normocephalic.      Nose: Nose normal.      Mouth/Throat:      Mouth: Mucous membranes are moist.      Pharynx: Oropharynx is clear.   Eyes:      General: No scleral icterus.        Right eye: No discharge.         Left eye: No discharge.      Extraocular Movements: Extraocular movements intact.      Conjunctiva/sclera: Conjunctivae normal.   Cardiovascular:      Rate and Rhythm: Normal rate. Rhythm irregular.      Pulses: Normal pulses.      Heart sounds: Murmur heard.   Pulmonary:      Effort: Pulmonary effort is normal. No respiratory distress.      Breath sounds: Normal breath sounds. No wheezing or rales.   Abdominal:      General: Bowel sounds are normal. There is no distension.      Palpations: Abdomen is soft.      Tenderness: There is no abdominal tenderness.   Musculoskeletal:         General: No swelling or deformity. Normal range of motion.      Cervical back: Neck supple. No rigidity.   Lymphadenopathy:      Cervical: No cervical  adenopathy.   Skin:     General: Skin is warm and dry.      Capillary Refill: Capillary refill takes less than 2 seconds.      Coloration: Skin is not jaundiced.      Findings: No rash.   Neurological:      General: No focal deficit present.      Mental Status: She is alert and oriented to person, place, and time. Mental status is at baseline.      Cranial Nerves: No cranial nerve deficit.      Coordination: Coordination normal.   Psychiatric:         Mood and Affect: Mood normal.         Behavior: Behavior normal.      Comments: Very pleasant       Results Review     I reviewed the patient's new clinical results.  Results from last 7 days   Lab Units 12/27/22  0450 12/26/22 0457 12/25/22 0452 12/23/22  0522   WBC 10*3/mm3 12.29* 14.28* 14.67* 12.62*   HEMOGLOBIN g/dL 10.1* 11.2* 10.5* 11.5*   PLATELETS 10*3/mm3 259 261 225 180     Results from last 7 days   Lab Units 12/27/22  0450 12/26/22 0457 12/25/22  2146 12/25/22 0452 12/23/22  1933 12/23/22  0522   SODIUM mmol/L 132* 135*  --  135*  --  134*   POTASSIUM mmol/L 3.9 4.4 3.7 3.2*   < > 3.2*   CHLORIDE mmol/L 98 97*  --  100  --  99   CO2 mmol/L 23.8 26.0  --  23.7  --  23.3   BUN mg/dL 14 12  --  14  --  14   CREATININE mg/dL 0.59 0.75  --  0.76  --  0.87   GLUCOSE mg/dL 118* 97  --  115*  --  104*   EGFR mL/min/1.73 87.9 77.6  --  76.4  --  65.0    < > = values in this interval not displayed.     Results from last 7 days   Lab Units 12/21/22  0506 12/20/22  1610   ALBUMIN g/dL 3.10* 3.30*   BILIRUBIN mg/dL 0.6 0.9   ALK PHOS U/L 51 52   AST (SGOT) U/L 22 16   ALT (SGPT) U/L 6 17     Results from last 7 days   Lab Units 12/27/22  0450 12/26/22 0457 12/25/22 0452 12/23/22  0522 12/22/22  0458 12/21/22  0506 12/20/22  1610   CALCIUM mg/dL 8.5* 9.1 8.5* 8.4*   < > 8.9 9.5   ALBUMIN g/dL  --   --   --   --   --  3.10* 3.30*   MAGNESIUM mg/dL 1.9 2.2 1.3*  --   --   --  1.6    < > = values in this interval not displayed.     Results from last 7 days   Lab  Units 12/27/22  0450 12/20/22  1820 12/20/22  1610   PROCALCITONIN ng/mL 0.14  --  0.67*   LACTATE mmol/L  --  2.0  --      No results found for: HGBA1C, POCGLU    No radiology results for the last day  Scheduled Medications  apixaban, 5 mg, Oral, Q12H  budesonide-formoterol, 2 puff, Inhalation, BID - RT  carbidopa-levodopa, 2 tablet, Oral, 4x Daily  cefTRIAXone, 2 g, Intravenous, Q24H  fluticasone, 2 spray, Nasal, Daily  furosemide, 40 mg, Oral, Daily  gabapentin, 400 mg, Oral, Daily  metoprolol tartrate, 25 mg, Oral, Q12H  pantoprazole, 40 mg, Oral, Daily  PARoxetine, 20 mg, Oral, QAM  sodium chloride, 10 mL, Intravenous, Q12H  sucralfate, 1 g, Oral, 4x Daily    Infusions   Diet  Diet: Cardiac Diets; Healthy Heart (2-3 Na+); Texture: Regular Texture (IDDSI 7); Fluid Consistency: Thin (IDDSI 0)       Assessment/Plan     Active Hospital Problems    Diagnosis  POA   • **Pneumonia due to infectious organism, unspecified laterality, unspecified part of lung [J18.9]  Yes   • Hypomagnesemia [E83.42]  No   • Aortic valve stenosis [I35.0]  Yes   • E coli bacteremia [R78.81, B96.20]  Yes   • Atrial fibrillation (HCC) [I48.91]  Yes   • Hypokalemia [E87.6]  Yes   • Gastroesophageal reflux disease [K21.9]  Yes   • GERD (gastroesophageal reflux disease) [K21.9]  Yes   • Hypertension [I10]  Yes   • Parkinsons (HCC) [G20]  Yes   • Rheumatoid arthritis (HCC) [M06.9]  Yes      Resolved Hospital Problems    Diagnosis Date Resolved POA   • Acute respiratory failure with hypoxia (HCC) [J96.01] 12/24/2022 Yes   • Sepsis (HCC) [A41.9] 12/24/2022 Yes       88yo woman with h/o AFib, Parkinson's, and HTN who presented to PCP's office with 2 weeks of weakness and productive cough. She was sent to ER with AFib with RVR and admitted with sepsis, PNA, and E.coli bacteremia.    PNA  E. coli bacteremia (?urinary source)  Sepsis, resolved  -Repeat blood cultures negative x 5d  -ID following  -Ceftriaxone through 12/27 per ID, they recommend IV  given resistance profile  -Midline prior to DC if pre-cert obtained before abx completed   -T100.9 yesterday AM and WBC was up to 14, no fever since and WBC down to 12 this AM  -Have asked ID to weigh in again, rechecked a CXR and it was fine, RVP pending, PCT neg, venous duplex BLE and UA pending, repeat blood cultures sent this AM    Hypokalemia/Hypomagnesemia  -replacing K+ as needed with protocol  -Mg++ wnl after replacement     Debility  -PT recommending SNF, CCP working on it     A. fib with RVR  -RC: Metoprolol  -AC: Was on therapeutic Lovenox initially, transitioned to apixaban 12/24  -F/u with Card in 2 weeks as outpt  -Episode of tachycardia yesterday AM with fever, HRs fine now     Acute diastolic heart failure  -Secondary to A. fib with RVR  -Cardiology followed  -Continue metoprolol and Lasix    NSTEMI due to demand ischemia in setting of AFib with RVR  -no ACS, no need to continue ASA     Parkinson's disease  -Sinemet      Acute hypoxic respiratory failure, resolved  -Multifactorial: PNA, acute heart failure  -Requiring some supplemental O2 yesterday but now stable on room air     • Eliquis, started this admission for AFib, should suffice for DVT ppx.  • Full code.  • Discussed with pt and CCP.  • Anticipate discharge to SNU facility when arrangements made      Enzo Brush MD  [facility]  12/27/22  09:02 EST       Holding RN to OR RN

## 2023-03-20 ENCOUNTER — APPOINTMENT (OUTPATIENT)
Dept: GENERAL RADIOLOGY | Facility: HOSPITAL | Age: 88
End: 2023-03-20
Payer: MEDICARE

## 2023-03-20 ENCOUNTER — APPOINTMENT (OUTPATIENT)
Dept: OTHER | Facility: HOSPITAL | Age: 88
End: 2023-03-20
Payer: MEDICARE

## 2023-03-20 ENCOUNTER — APPOINTMENT (OUTPATIENT)
Dept: CT IMAGING | Facility: HOSPITAL | Age: 88
End: 2023-03-20
Payer: MEDICARE

## 2023-03-20 ENCOUNTER — HOSPITAL ENCOUNTER (EMERGENCY)
Facility: HOSPITAL | Age: 88
Discharge: HOME | End: 2023-03-20
Attending: EMERGENCY MEDICINE | Admitting: EMERGENCY MEDICINE
Payer: MEDICARE

## 2023-03-20 VITALS
SYSTOLIC BLOOD PRESSURE: 108 MMHG | DIASTOLIC BLOOD PRESSURE: 59 MMHG | HEIGHT: 66 IN | OXYGEN SATURATION: 92 % | WEIGHT: 135 LBS | BODY MASS INDEX: 21.69 KG/M2 | TEMPERATURE: 98.1 F | HEART RATE: 59 BPM | RESPIRATION RATE: 16 BRPM

## 2023-03-20 DIAGNOSIS — S09.90XA MINOR CLOSED HEAD INJURY: ICD-10-CM

## 2023-03-20 DIAGNOSIS — S51.812A LACERATION OF LEFT FOREARM, INITIAL ENCOUNTER: ICD-10-CM

## 2023-03-20 DIAGNOSIS — Z09 FOLLOW-UP EXAM: ICD-10-CM

## 2023-03-20 DIAGNOSIS — Z79.01 ANTICOAGULATED: ICD-10-CM

## 2023-03-20 DIAGNOSIS — S63.502A SPRAIN OF LEFT WRIST, INITIAL ENCOUNTER: ICD-10-CM

## 2023-03-20 DIAGNOSIS — S00.81XA ABRASION OF FOREHEAD, INITIAL ENCOUNTER: ICD-10-CM

## 2023-03-20 DIAGNOSIS — S62.002A CLOSED NONDISPLACED FRACTURE OF SCAPHOID OF LEFT WRIST, UNSPECIFIED PORTION OF SCAPHOID, INITIAL ENCOUNTER: ICD-10-CM

## 2023-03-20 DIAGNOSIS — S05.12XA PERIORBITAL CONTUSION OF LEFT EYE, INITIAL ENCOUNTER: Primary | ICD-10-CM

## 2023-03-20 PROCEDURE — 90471 IMMUNIZATION ADMIN: CPT | Performed by: EMERGENCY MEDICINE

## 2023-03-20 PROCEDURE — 99283 EMERGENCY DEPT VISIT LOW MDM: CPT

## 2023-03-20 PROCEDURE — 25010000002 TETANUS-DIPHTH-ACELL PERTUSSIS 5-2.5-18.5 LF-MCG/0.5 SUSPENSION PREFILLED SYRINGE: Performed by: EMERGENCY MEDICINE

## 2023-03-20 PROCEDURE — 72125 CT NECK SPINE W/O DYE: CPT

## 2023-03-20 PROCEDURE — 90715 TDAP VACCINE 7 YRS/> IM: CPT | Performed by: EMERGENCY MEDICINE

## 2023-03-20 PROCEDURE — 70450 CT HEAD/BRAIN W/O DYE: CPT

## 2023-03-20 PROCEDURE — 70486 CT MAXILLOFACIAL W/O DYE: CPT

## 2023-03-20 PROCEDURE — 99284 EMERGENCY DEPT VISIT MOD MDM: CPT

## 2023-03-20 PROCEDURE — 73110 X-RAY EXAM OF WRIST: CPT

## 2023-03-20 RX ORDER — HYDROCODONE BITARTRATE AND ACETAMINOPHEN 5; 325 MG/1; MG/1
1 TABLET ORAL ONCE
Status: COMPLETED | OUTPATIENT
Start: 2023-03-20 | End: 2023-03-20

## 2023-03-20 RX ORDER — LIDOCAINE HYDROCHLORIDE AND EPINEPHRINE 10; 10 MG/ML; UG/ML
10 INJECTION, SOLUTION INFILTRATION; PERINEURAL ONCE
Status: COMPLETED | OUTPATIENT
Start: 2023-03-20 | End: 2023-03-20

## 2023-03-20 RX ADMIN — HYDROCODONE BITARTRATE AND ACETAMINOPHEN 1 TABLET: 5; 325 TABLET ORAL at 13:56

## 2023-03-20 RX ADMIN — LIDOCAINE HYDROCHLORIDE,EPINEPHRINE BITARTRATE 10 ML: 10; .01 INJECTION, SOLUTION INFILTRATION; PERINEURAL at 15:13

## 2023-03-20 RX ADMIN — TETANUS TOXOID, REDUCED DIPHTHERIA TOXOID AND ACELLULAR PERTUSSIS VACCINE, ADSORBED 0.5 ML: 5; 2.5; 8; 8; 2.5 SUSPENSION INTRAMUSCULAR at 11:43

## 2023-03-20 NOTE — DISCHARGE INSTRUCTIONS
Have stitches removed in 7 to 8 days.  Wear wrist brace/splint at all times.  Apply cold compresses to your left wrist as needed.  Return to the emergency department for worsening headache, dizziness, confusion, vomiting, numbness/tingling in left hand, or other concern.  Follow-up with Dr. Hernandez (hand specialist) as soon as possible.

## 2023-03-20 NOTE — ED NOTES
Pt arrives via EMS from home for a fall out of bed this morning. Pt has bruising and a abrasion to the left side of her forehead. Pt denies LOC. Pt takes Eliquis. Pt also has a skin tear on her left arm

## 2023-03-20 NOTE — ED NOTES
Nonadherent dressing placed over sutures, secured with kerlix and coban. Pt sent home with supplies and verbalized understanding along with family.

## 2023-03-20 NOTE — ED PROVIDER NOTES
EMERGENCY DEPARTMENT ENCOUNTER    Room Number:  22/22  Date seen:  3/20/2023  PCP: Nayla Higginbotham APRN  Historian: Patient      HPI:  Chief Complaint: Fall  A complete HPI/ROS/PMH/PSH/SH/FH are unobtainable due to: Nothing  Context: Trang Liu is a 87 y.o. female who presents to the ED from home by EMS c/o falling out of bed early this morning.  Patient says she was dreaming and in her dream, she fell after stepping in a hole.  She then woke up on the floor next to her bed.  She hit her head and left wrist.  She complains of a mild headache, left facial pain, and left wrist pain.  She has a skin tear on her left wrist.  Denies nausea, vomiting, dizziness, chest pain, shortness of breath, back pain, or numbness/tingling/weakness in her extremities.  She takes Eliquis.            PAST MEDICAL HISTORY  Active Ambulatory Problems     Diagnosis Date Noted   • Upper GI bleed 05/27/2018   • Diastolic dysfunction 05/27/2018   • GERD (gastroesophageal reflux disease) 05/27/2018   • Hypertension 05/27/2018   • Parkinsons ([facility]) 05/27/2018   • Rheumatoid arthritis ([facility]) 05/27/2018   • Anemia, posthemorrhagic, acute 05/28/2018   • Gastroesophageal reflux disease 05/29/2018   • Urinary tract infection, site not specified 12/18/2018   • Rheumatoid arthritis ([facility]) 01/02/2019   • Hypokalemia 02/12/2019   • Atrial fibrillation ([facility]) 02/12/2019   • Abdominal pain 02/12/2019   • Anxiety 02/12/2019   • Hypomagnesemia 02/15/2019   • Pneumonia due to infectious organism, unspecified laterality, unspecified part of lung 12/20/2022   • E coli bacteremia 12/21/2022   • Aortic valve stenosis 12/22/2022   • Hypomagnesemia 12/25/2022   • Opioid dependence ([facility]) 12/29/2022   • Diastolic CHF, acute ([facility]) 12/29/2022   • Chronic pain syndrome 12/29/2022     Resolved Ambulatory Problems     Diagnosis Date Noted   • Sepsis ([facility]) 12/20/2022   • Acute respiratory failure with hypoxia ([facility]) 12/21/2022     Past Medical History:    Diagnosis Date   • Aortic valve insufficiency    • Ataxia    • H/O hernia repair    • History of hip replacement    • Insomnia    • Mitral regurgitation    • Rotator cuff disorder    • Spastic colon    • Tremor    • Tricuspid valve regurgitation    • UTI (urinary tract infection)    • Wears glasses          PAST SURGICAL HISTORY  Past Surgical History:   Procedure Laterality Date   • APPENDECTOMY     • CATARACT EXTRACTION     • ENDOSCOPY N/A 5/30/2018    LA Grade B reflux esophagitis, HH    • HERNIA REPAIR     • HYSTERECTOMY           FAMILY HISTORY  Family History   Problem Relation Age of Onset   • Diabetes Mother    • Arthritis Mother    • Heart failure Mother    • Diabetes Father    • Heart disease Father    • Arthritis Father          SOCIAL HISTORY  Social History     Socioeconomic History   • Marital status:    Tobacco Use   • Smoking status: Never   • Smokeless tobacco: Never   Substance and Sexual Activity   • Alcohol use: No   • Drug use: No   • Sexual activity: Defer         ALLERGIES  Cimetidine, Codeine, Doxycycline, Guaifenesin & derivatives, Nitrofuran derivatives, Oxaprozin, Penicillins, and Sulfa antibiotics        REVIEW OF SYSTEMS  Review of Systems     All systems have been reviewed and are negative except as as discussed in the HPI    PHYSICAL EXAM  ED Triage Vitals [03/20/23 0935]   Temp Heart Rate Resp BP SpO2   98.1 °F (36.7 °C) 65 16 154/75 95 %      Temp src Heart Rate Source Patient Position BP Location FiO2 (%)   Oral Monitor -- -- --       Physical Exam      GENERAL: Awake, alert, oriented x3.  Well-developed, well-nourished elderly female.  No acute distress  HENT: There is there is bruising on the left forehead.  There is an abrasion on the left side of the forehead.  There is tenderness and bruising in the left infraorbital region.  No malocclusion.  No dental injury  EYES: PERRL, EOMI  CV: regular rhythm, normal rate, normal left radial pulse  RESPIRATORY: normal effort,  clear to auscultation bilaterally  ABDOMEN: soft, nontender  MUSCULOSKELETAL: There is a laceration over the dorsal aspect of the left wrist.  There is surrounding bruising and tenderness.  The rest of her extremities are nontender.  There is mild tenderness of the C-spine.  No step-offs.  Chest and T/L-spine are nontender.  Pelvis is stable  NEURO: Speech is normal.  No facial droop.  Follows commands.  Normal strength and light touch sensation in all extremities.  PSYCH:  calm, cooperative  SKIN: warm, dry    Vital signs and nursing notes reviewed.          LAB RESULTS  No results found for this or any previous visit (from the past 24 hour(s)).    Ordered the above labs and reviewed the results.        RADIOLOGY  XR Wrist 3+ View Left    Result Date: 3/20/2023  XR WRIST 3+ VW LEFT-  INDICATIONS: Trauma  TECHNIQUE: 4 views of the left wrist  COMPARISON: None available  FINDINGS:  Fracture of the scaphoid waist is apparent. No other recent fractures are identified. The bones appear diffusely osteopenic. Osteoarthritic degenerative changes are conspicuous. Soft tissue swelling is evident dorsally at the wrist and distal forearm.       As described.  This report was finalized on 3/20/2023 12:03 PM by Dr. Kai Duff M.D.      CT Facial Bones Without Contrast, CT Cervical Spine Without Contrast, CT Head Without Contrast    Result Date: 3/20/2023  EMERGENCY NONCONTRAST HEAD CT, FACIAL CT AND CERVICAL SPINE CT ON 03/20/2023  CLINICAL HISTORY: Patient fell, has left head and facial bruising from supraorbital region to the low left cheekbone, has head, face and neck pain. The patient is on blood thinners - Eliquis.  HEAD CT TECHNIQUE: Spiral CT images were obtained from the base of the skull to the vertex without intravenous contrast. The images were reformatted and are submitted in 3 mm thick axial, sagittal and coronal CT sections with brain algorithm and 2 mm thick axial CT sections with high-resolution bone  algorithm. I subsequently had the images reconstructed at 1 mm thick axial, sagittal and coronal CT sections with brain algorithm.  COMPARISON: This is correlated to prior head CT from [facility] on 07/09/2014.  FINDINGS: There are patchy and confluent areas of low-density extending from the periventricular into the subcortical white matter of the cerebral hemispheres consistent with moderate to severe small vessel disease. The remainder of the brain parenchyma is normal in attenuation. There is diffuse cerebral atrophy. The lateral and 3rd ventricles are upper limits of normal in size. I see no focal mass effect. There is no midline shift. No extraaxial fluid collections are identified. There is a punctate focus of hyperdensity along the anterior superior medial aspect of the left tentorium cerebelli seen on sagittal reconstructed image 37 sequence 4 and coronal images 38 and 39 sequence 3 and axial image 17 sequence 2 that is probably faint mineralization as its Hounsfield units get up to 89. This is more dense than typically seen with hemorrhage. The images were reconstructed in 1 mm thick axial, sagittal and coronal CT sections to further evaluate this density and on sagittal reconstructed image 109 sequence 8 I can get its Hounsfield unit up to 103 Hounsfield units which is denser than hemorrhage and this may be some calcification adjacent to the juxta tentorial vein that appears to branch when going from sagittal image 37-38 sequence 4. No convincing acute intracranial hemorrhage is identified. There is an extra-axial dural-based focus in segment of dense calcification posterior to the mastoid portion left temporal bone that measures 7 x 4 x 4 mm likely dense dystrophic dural calcification versus possibly a densely calcified meningioma and this is unchanged since prior head CT on 07/09/2014. The calvarium and skull base are normal in appearance with no acute skull fracture identified. Paranasal  sinuses and mastoid air cells and middle ear cavities are clear calcified plaques are present in the intracranial segments of distal vertebral arteries as well as the cavernous segments of the internal carotid arteries bilaterally.      1. No convincing acute intracranial abnormality is identified 2. There is moderate severe small vessel disease in cerebral white matter calcified plaques in the intracranial segments distal vertebral arteries cavernous segments of internal carotid arteries bilaterally and there is diffuse cerebral atrophy. 3. There is a tiny focus of increased density along the superior margin of the anterior superior medial aspect of the left tentorium cerebelli and I requested additional 1 mm thick axial sagittal and coronal CT reconstructions to further characterize this density and there is a tiny focus within this density that measures up to 103 Hounsfield units which is denser than acute hemorrhage and therefore this is probably of small focus of mineralization which appears to possibly branch on the sagittal images and could be within the juxta tentorial vein or potentially dystrophic mineralization in a tiny en plaque meningioma or merely dystrophic dural calcification and I do not think that it represents a focus of acute hemorrhage. There is an extra-axial dural-based dense focus of calcification adjacent to the posterior aspect of the mastoid portion of the left temporal bone that measures 7 x 4 x 4 mm in size unchanged since head CT 07/09/2014 likely benign dystrophic dural calcification but could potentially be a stable densely calcified meningioma. 4. The remainder of the head CT is normal with no acute skull fracture seen and no convincing acute intracranial hemorrhage identified.  FACIAL CT TECHNIQUE: Spiral CT images were obtained through the facial bones without intravenous contrast. Images were reformatted and submitted in 2 mm thick axial, sagittal and coronal CT sections with  soft tissue algorithm and 1 mm thick axial, sagittal and coronal CT sections with high-resolution bone algorithm.  FINDINGS: There is a small scalp hematoma in the lateral left periorbital region with subcutaneous hemorrhage and a facial hematoma extending over the left anterior lateral face. I see no underlying acute facial fracture. There are osteoarthritic changes in the temporomandibular joints with bilaterally with marginal spurring off the mandibular heads bilaterally left greater than right. The orbits are normal in appearance. The paranasal sinuses are clear. The mastoid and middle ear cavities are clear.  IMPRESSION: 1. There is a scalp hematoma in the left lateral periorbital fat and hemorrhage extending into the subcutaneous fat over the left anterior lateral face from today's head and facial trauma. No underlying acute facial fracture is seen. 2. There are some osteoarthritic changes in the temporomandibular joints bilaterally with marginal spurring off the mandibular heads bilaterally left greater than right. The remainder of the facial CT is unremarkable  CERVICAL SPINE CT TECHNIQUE: Spiral CT images were obtained from the skull base down to the T4 thoracic level and images were reformatted and submitted in 2 mm thick axial and sagittal CT sections with soft tissue algorithm and 1 mm thick axial, sagittal and coronal CT sections with high-resolution bone algorithm.  COMPARISON: There are no prior studies from [facility] for comparison.  FINDINGS: The atlantooccipital articulation is within normal limits.  At C1-2 there are arthritic changes at the atlantodental interval with marginal spurring off the anterior ring of C1 and some thickening and calcification of the transverse ligament along the back of the odontoid. Otherwise the C1-2 level is normal in appearance.  At C2-3 there is mild right and mild-to-moderate left facet overgrowth and 1 mm anterolisthesis of C2 on C3. There is mild  left bony foraminal narrowing. No central canal or right foraminal narrowing.  At C3-4 there is moderate-to-severe right and minimal left facet overgrowth and there is 2-3 mm anterolisthesis of C3 with respect to C4. Posterior endplate spurs abut and flatten the ventral surface of the cord contributing to moderate central canal narrowing. There is uncovertebral joint spurring into the foramina, right facet spurring into the right foramen and there is mild-to-moderate bilateral bony foraminal narrowing.  At C4-5 there is moderate-to-severe disc space narrowing. There are degenerative endplate changes and reversal of the normal cervical lordosis centered at C4-5. Posterior spurs abut and mildly deform the ventral surface of the cord. There is mild-to-moderate canal narrowing. There is some uncovertebral joint spurring into the foramina and there is mild bilateral foraminal narrowing.  At C5-6 there is severe disc space narrowing. There are degenerative endplate changes. Posterior disc osteophyte complex abuts and flattens the ventral surface of the cord moderately narrowing the canal. There is uncovertebral joint spurring into the foramina and there is mild-to-moderate right and moderate left bony foraminal narrowing.  At C6-7 there is moderate disc space narrowing. There are degenerative endplate changes. Posterior spurs abut the ventral surface of the cord mild to moderately narrowing the canal. There is uncovertebral joint spurring into the foramina and mild-to-moderate right and there is moderate left bony foraminal narrowing.  At C7-T1 there is moderate bilateral facet overgrowth and a 2 mm degenerative anterolisthesis of C7 on T1. There is no canal narrowing. There is mild left but no right foraminal narrowing.  No acute fracture is seen in the cervical spine.  IMPRESSION: 1. No acute fracture is seen in the cervical spine. There is cervical spondylosis as described above.   Radiation dose reduction techniques  were utilized, including automated exposure control and exposure modulation based on body size.  This report was finalized on 3/20/2023 5:28 PM by Dr. Tate Moise M.D.      CT Outside Films    Result Date: 3/20/2023  This procedure was auto-finalized with no dictation required.      Ordered the above noted radiological studies. Reviewed by me in PACS.            PROCEDURES  Laceration Repair    Date/Time: 3/20/2023 3:37 PM  Performed by: Casa Mosqueda MD  Authorized by: Casa Mosqueda MD     Consent:     Consent obtained:  Verbal    Risks discussed:  Infection, pain, poor cosmetic result and poor wound healing  Universal protocol:     Patient identity confirmed:  Verbally with patient  Anesthesia:     Anesthesia method:  Local infiltration    Local anesthetic:  Lidocaine 1% WITH epi  Laceration details:     Location:  Shoulder/arm    Shoulder/arm location:  L lower arm    Length (cm):  2.5  Exploration:     Hemostasis achieved with:  Epinephrine and direct pressure    Imaging obtained: x-ray      Imaging outcome: foreign body not noted      Wound exploration: wound explored through full range of motion      Contaminated: no    Treatment:     Area cleansed with:  Chlorhexidine    Amount of cleaning:  Standard    Irrigation solution:  Sterile saline    Irrigation method:  Syringe    Debridement:  None    Undermining:  None  Skin repair:     Repair method:  Sutures    Suture size:  4-0    Suture material:  Prolene    Suture technique:  Simple interrupted    Number of sutures:  4  Approximation:     Approximation:  Close                  MEDICATIONS GIVEN IN ER  Medications   Tetanus-Diphth-Acell Pertussis (BOOSTRIX) injection 0.5 mL (0.5 mL Intramuscular Given 3/20/23 1143)   lidocaine 1% - EPINEPHrine 1:[id number] (XYLOCAINE W/EPI) 1 %-1:610111 injection 10 mL (10 mL Injection Given by Other 3/20/23 1513)   HYDROcodone-acetaminophen (NORCO) 5-325 MG per tablet 1 tablet (1 tablet Oral Given 3/20/23 1356)                    MEDICAL DECISION MAKING, PROGRESS, and CONSULTS    All labs have been independently reviewed by me.  All radiology studies have been reviewed by me and I have also reviewed the radiology report.   EKG's independently viewed and interpreted by me.  Discussion below represents my analysis of pertinent findings related to patient's condition, differential diagnosis, treatment plan and final disposition.      Additional sources:  - Discussed/ obtained information from independent historians: Daughter at bedside    - External (non-ED) record review: Patient was last admitted here in December 2022 for pneumonia, sepsis, E. coli bacteremia, and A-fib with RVR.  She was treated with ceftriaxone.  Head CT done in February 2020 showed chronic small vessel ischemic changes but was negative acute.    - Chronic or social conditions impacting care: N/A          Orders placed during this visit:  Orders Placed This Encounter   Procedures   • Laceration Repair   • [facility] 07.  Wrist Brace With ABD Thumb   • CT Facial Bones Without Contrast   • CT Cervical Spine Without Contrast   • XR Wrist 3+ View Left   • CT Head Without Contrast   • CT Outside Films   • Supplies To Bedside - Notify MD When Ready- Suture Tray / Cart         Additional orders considered but not ordered:  N/A        Differential diagnosis:    Skull fracture, C-spine fracture, facial fracture, intracranial hemorrhage, closed head injury, wrist fracture      Independent interpretation of labs, radiology studies, and discussions with consultants:  ED Course as of 03/20/23 1833   Mon Mar 20, 2023   1217 Left wrist x-ray personally interpreted by me.  My personal interpretation is: There are diffuse degenerative changes.  There is soft tissue swelling.  There is a scaphoid fracture. [WH]   1332 Results of the CTs of the head, facial bones, and C-spine were discussed with Dr. Moise, radiologist.  There is soft tissue swelling in the left  infraorbital region.  No fractures of the skull, face, or C-spine.  No intracranial hemorrhage.  See dictated report for details. [WH]   1510 Laceration was repaired.  See procedure note for details.  Patient remains awake, alert, and oriented x3.  Her daughter is now at bedside.  Left wrist x-ray shows a scaphoid fracture.  However, patient is not point tender over the scaphoid.  She will be placed in a Velcro thumb spica splint. [WH]   1534 Patient presented the ED after falling out of bed.  She is on Eliquis.  CTs of the head/face/C-spine were negative for fracture or intracranial hemorrhage.  There was a left facial hematoma.  Left wrist x-ray showed a scaphoid fracture.  However the patient was not tender over this area.  She will be placed in a thumb spica splint.  Patient had a laceration on her left forearm which was repaired by me.   [WH]      ED Course User Index  [WH] Casa Mosqueda MD               DIAGNOSIS  Final diagnoses:   Periorbital contusion of left eye, initial encounter   Abrasion of forehead, initial encounter   Minor closed head injury   Laceration of left forearm, initial encounter   Anticoagulated   Sprain of left wrist, initial encounter   Closed nondisplaced fracture of scaphoid of left wrist, unspecified portion of scaphoid, initial encounter         DISPOSITION  DISCHARGE    Patient discharged in stable condition.    Reviewed implications of results, diagnosis, meds, responsibility to follow up, warning signs and symptoms of possible worsening, potential complications and reasons to return to ER, including persistent headache, nausea, vomiting, dizziness, confusion, vision changes, numbness/tingling/weakness in left arm, or other concern.    Patient/Family voiced understanding of above instructions.    Discussed plan for discharge, as there is no emergent indication for admission. Patient referred to primary care provider for BP management due to today's BP. Pt/family is agreeable  and understands need for follow up and repeat testing.  Pt is aware that discharge does not mean that nothing is wrong but it indicates no emergency is present that requires admission and they must continue care with follow-up as given below or physician of their choice.     FOLLOW-UP  Neftaly Nayla Sis, APRN  [address]  [facility] [id number] [phone]    In 1 week  For suture removal    Macho Hernandez MD  [address]  [address]  [facility] [id number] [phone]      Left scaphoid fracture         Medication List      No changes were made to your prescriptions during this visit.                   Latest Documented Vital Signs:  As of 18:32 EDT  BP- 108/59 HR- 59 Temp- 98.1 °F (36.7 °C) (Oral) O2 sat- 92%              --    Please note that portions of this were completed with a voice recognition program.       Note Disclaimer: At [facility], we believe that sharing information builds trust and better relationships. You are receiving this note because you are receiving care at [facility] or recently visited. It is possible you will see health information before a provider has talked with you about it. This kind of information can be easy to misunderstand. To help you fully understand what it means for your health, we urge you to discuss this note with your provider.           Casa Mosqueda MD  03/20/23 1466

## (undated) DEVICE — BITEBLOCK OMNI BLOC

## (undated) DEVICE — Device: Brand: DEFENDO AIR/WATER/SUCTION AND BIOPSY VALVE

## (undated) DEVICE — FRCP BX RADJAW4 NDL 2.8 240CM LG OG BX40

## (undated) DEVICE — TBG 02 CRUSH RESIST LF CLR 7FT

## (undated) DEVICE — TUBING, SUCTION, 1/4" X 10', STRAIGHT: Brand: MEDLINE

## (undated) DEVICE — CANN NASL CO2 TRULINK W/O2 A/